# Patient Record
Sex: MALE | Race: BLACK OR AFRICAN AMERICAN | NOT HISPANIC OR LATINO | Employment: UNEMPLOYED | URBAN - METROPOLITAN AREA
[De-identification: names, ages, dates, MRNs, and addresses within clinical notes are randomized per-mention and may not be internally consistent; named-entity substitution may affect disease eponyms.]

---

## 2024-06-21 ENCOUNTER — HOSPITAL ENCOUNTER (EMERGENCY)
Facility: CLINIC | Age: 24
Discharge: HOME | End: 2024-06-21
Attending: EMERGENCY MEDICINE | Admitting: EMERGENCY MEDICINE
Payer: MEDICAID

## 2024-06-21 VITALS
OXYGEN SATURATION: 100 % | HEART RATE: 62 BPM | RESPIRATION RATE: 16 BRPM | SYSTOLIC BLOOD PRESSURE: 113 MMHG | TEMPERATURE: 97.7 F | DIASTOLIC BLOOD PRESSURE: 58 MMHG | WEIGHT: 160 LBS

## 2024-06-21 DIAGNOSIS — R46.89 ABNORMAL BEHAVIOR: ICD-10-CM

## 2024-06-21 PROBLEM — F41.9 ANXIETY: Status: ACTIVE | Noted: 2024-06-21

## 2024-06-21 PROBLEM — F43.22 ADJUSTMENT DISORDER WITH ANXIOUS MOOD: Status: ACTIVE | Noted: 2024-06-21

## 2024-06-21 LAB
AMPHETAMINES UR QL SCN: NORMAL
BARBITURATES UR QL SCN: NORMAL
BENZODIAZ UR QL SCN: NORMAL
BZE UR QL SCN: NORMAL
CANNABINOIDS UR QL SCN: NORMAL
FENTANYL UR QL: NORMAL
OPIATES UR QL SCN: NORMAL
PCP QUAL URINE (ROCHE): NORMAL

## 2024-06-21 PROCEDURE — 80307 DRUG TEST PRSMV CHEM ANLYZR: CPT | Performed by: EMERGENCY MEDICINE

## 2024-06-21 PROCEDURE — 250N000013 HC RX MED GY IP 250 OP 250 PS 637: Performed by: EMERGENCY MEDICINE

## 2024-06-21 PROCEDURE — 99285 EMERGENCY DEPT VISIT HI MDM: CPT

## 2024-06-21 RX ORDER — OLANZAPINE 10 MG/1
10 TABLET, ORALLY DISINTEGRATING ORAL ONCE
Status: COMPLETED | OUTPATIENT
Start: 2024-06-21 | End: 2024-06-21

## 2024-06-21 RX ORDER — DIPHENHYDRAMINE HCL 25 MG
25 CAPSULE ORAL ONCE
Status: COMPLETED | OUTPATIENT
Start: 2024-06-21 | End: 2024-06-21

## 2024-06-21 RX ADMIN — DIPHENHYDRAMINE HYDROCHLORIDE 25 MG: 25 CAPSULE ORAL at 08:46

## 2024-06-21 RX ADMIN — OLANZAPINE 10 MG: 10 TABLET, ORALLY DISINTEGRATING ORAL at 08:46

## 2024-06-21 ASSESSMENT — ACTIVITIES OF DAILY LIVING (ADL)
ADLS_ACUITY_SCORE: 35

## 2024-06-21 NOTE — ED NOTES
Bed: [facility]  Expected date:   Expected time:   Means of arrival:   Comments:  [facility] [age] M suicidal restrained eta 0894

## 2024-06-21 NOTE — ED PROVIDER NOTES
"  Emergency Department Note      History of Present Illness     Chief Complaint  Abnormal Behavior    HPI  Mack Raman is a 23 year old male with a history of polysubstance abuse who presents to the emergency department for evaluation of abnormal behavior. He was found in the rain by PD, and brought in for psychiatric concerns. Per EMS, he was having some thoughts of self harm, suicidal ideation, and homicidal ideation. He initially requested restraints due to concerns that he may try to harm someone, but at bedside does not seem as concerned for this anymore. He is homeless. He was attempting to walk from [place] to [place] in order to \"blow off some steam.\" He also notes that he has not been able to sleep for some time due to feeling unsafe. He denies having any hallucinations, medical problems, or history of harming someone else. He does have a history of drug abuse, and was recently released from a treatment facility in [place], where he was being treated for this issue. He denies any drug use since being discharged. He denies methamphetamine use, but does endorse marijuana use. At bedside, he complains of urinary retention, but this was not an issue before presenting in the ED. Per EMS, blood pressure was 140/90, oxygen saturation at 100%, and heart rate at 60/70 bpm.     Independent Historian  EMS as detailed above.    Review of External Notes  None  Past Medical History   Medical History and Problem List  None    Medications  None    Surgical History   None  Physical Exam   Patient Vitals for the past 24 hrs:   BP Temp Temp src Pulse Resp SpO2 Weight   06/21/24 0840 (!) 130/95 97.7  F (36.5  C) Temporal 62 16 100 % 72.6 kg (160 lb)     Physical Exam  Constitutional: Well developed, nontox appearance  Head: Atraumatic.   Neck:  no stridor  Eyes: no scleral icterus  Cardiovascular: RRR, 2+ right radial pulse  Pulmonary/Chest: nml resp effort  Ext: Warm, well perfused  Neurological: A&O, " symmetric facies, moves ext x4  Skin: Skin is warm and dry.   Psychiatric: Denies SI/HI, does not appear to be responding to internal stimuli, somewhat calm and cooperative  Nursing note and vitals reviewed  Diagnostics   Lab Results   Labs Ordered and Resulted from Time of ED Arrival to Time of ED Departure   URINE DRUG SCREEN PANEL - Normal       Result Value    Amphetamines Urine Screen Negative      Barbituates Urine Screen Negative      Benzodiazepine Urine Screen Negative      Cannabinoids Urine Screen Negative      Cocaine Urine Screen Negative      Fentanyl Qual Urine Screen Negative      Opiates Urine Screen Negative      PCP Urine Screen Negative         Imaging  No orders to display     Independent Interpretation  None  ED Course    Medications Administered  Medications   OLANZapine zydis (zyPREXA) ODT tab 10 mg (10 mg Oral $Given 6/21/24 0846)   diphenhydrAMINE (BENADRYL) capsule 25 mg (25 mg Oral $Given 6/21/24 0846)       Procedures  Procedures     Discussion of Management  None    Social Determinants of Health adding to complexity of care  History of drug use increasing risk for presentation to the emergency department    ED Course  ED Course as of 06/21/24 1450   Fri Jun 21, 2024   0825 I obtained history and examined the patient as noted above.     0925 I spoke with DEC regarding the patient.     Medical Decision Making / Diagnosis   CMS Diagnoses: None    MIPS     None    MDM  23 year old male presenting w/ abnormal behavior     DDx includes psychiatric disorder NOS, personality disorder NOS, abnormal behavior NOS, substance abuse, polysubstance abuse, candei, bipolar disorder.  Doubt meningitis, encephalitis significant trauma given history and physical exam.  Labs significant for drug screen negative.  Interventions as noted above with improvement in symptoms the patient was able to sleep in the emergency department.  Prior to the patient sleeping, DEC evaluated the patient and will plan to  reevaluate the patient later given the evaluation was limited secondary to the medications the patient received.  Should DEC clear him later, I feel he will be safe for discharge.  Patient signed out in stable condition awaiting reevaluation.    Disposition  Care of the patient was transferred to my colleague Dr. Musa pending DEC reevaluation.     ICD-10 Codes:    ICD-10-CM    1. Abnormal behavior  R46.89            Discharge Medications  New Prescriptions    No medications on file         Scribe Disclosure:  I, Noy Garcia, am serving as a scribe at 8:46 AM on 6/21/2024 to document services personally performed by Franco Langston MD based on my observations and the provider's statements to me.        Franco Langston MD  06/21/24 6352

## 2024-06-21 NOTE — ED NOTES
Pt very upset about another pt in the  area because the pt is loud. Writer assessed the situation and spoke with DEC

## 2024-06-21 NOTE — PROGRESS NOTES
Writer tried to check in with patient to determine final disposition however patient was still sleeping and mumbled that he was still resting.      Writer will hand off to an evening LM for follow.  Writer would recommend discharge if patient continues to display future oriented, denies SI/HI, and identifies safe place to discharge to.     Anthony Michaels, LICSW
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
## 2024-06-21 NOTE — PROGRESS NOTES
"Hand off was received from Anthony Michaels [facility], who completed patient's initial DEC assessment earlier this morning. The recommended disposition for patient at that time was to discharge if his presentation remained the same (calm, denied HI/SI) after getting some sleep in the ED. Writer met with patient at approximately 5 PM for discharge and aftercare planning. He had just woken up to eat a snack. He appeared somnolent, but stated, \"I had the best sleep I ever had.\" He stated that he has not used substances and that his altered mental status prior to ED admission was due to lack of sleep. He requested to get a little bit more rest and food before discharging.  He stated that he has not been to a shelter before,  but would need to \"look inside\" before agreeing to go to one. He prefers to discharge \"to the streets.\" He stated that he has been homeless for many years and knows how to navigate the streets and resources. He has been calm, cooperative, and polite. He denied SI and HI.   "

## 2024-06-21 NOTE — ED NOTES
"[facility] brought in patient from [facility] transit hub. Welfare check had been called. Pt standing in the rain, stating he wanted to go to war and be killed; multiple statements about being killed. EMS reports pt being very labile with emotions. EMS also reports pt requesting to be restrained because he thinks he might hurt others. Pt is unsure about own medical hx, states he does not take medications on a regular basis, and states he does not know when he last slept. EMS reports stable vital signs. No BG done with EMS.    Pt arrived to [facility] in restraints. Pt requested to remain in restraints because he was \"afraid he will hurt someone\". Pt changed into spice scrubs.    "

## 2024-06-21 NOTE — ED NOTES
DEC spoke with [facility].  Because of disruption in milieu of other patients in area this pt room is, pt is not getting sleep.  Plan is to send to [facility] to rest and discharge

## 2024-06-22 ENCOUNTER — HOSPITAL ENCOUNTER (EMERGENCY)
Facility: CLINIC | Age: 24
Discharge: HOME | End: 2024-06-22
Attending: FAMILY MEDICINE | Admitting: FAMILY MEDICINE
Payer: MEDICAID

## 2024-06-22 ENCOUNTER — HOSPITAL ENCOUNTER (EMERGENCY)
Facility: CLINIC | Age: 24
Discharge: HOME | End: 2024-06-22
Attending: EMERGENCY MEDICINE | Admitting: EMERGENCY MEDICINE
Payer: MEDICAID

## 2024-06-22 VITALS
HEART RATE: 81 BPM | OXYGEN SATURATION: 100 % | TEMPERATURE: 97.6 F | WEIGHT: 156 LBS | BODY MASS INDEX: 19.4 KG/M2 | SYSTOLIC BLOOD PRESSURE: 121 MMHG | DIASTOLIC BLOOD PRESSURE: 75 MMHG | RESPIRATION RATE: 18 BRPM | HEIGHT: 75 IN

## 2024-06-22 VITALS
OXYGEN SATURATION: 100 % | WEIGHT: 156 LBS | HEART RATE: 74 BPM | DIASTOLIC BLOOD PRESSURE: 74 MMHG | BODY MASS INDEX: 20.02 KG/M2 | RESPIRATION RATE: 16 BRPM | TEMPERATURE: 98.2 F | SYSTOLIC BLOOD PRESSURE: 117 MMHG | HEIGHT: 74 IN

## 2024-06-22 DIAGNOSIS — G89.29 CHRONIC RIGHT-SIDED LOW BACK PAIN WITH RIGHT-SIDED SCIATICA: ICD-10-CM

## 2024-06-22 DIAGNOSIS — F30.8 HYPOMANIC EPISODE (H): ICD-10-CM

## 2024-06-22 DIAGNOSIS — F19.10 POLYSUBSTANCE ABUSE (H): ICD-10-CM

## 2024-06-22 DIAGNOSIS — M54.41 CHRONIC RIGHT-SIDED LOW BACK PAIN WITH RIGHT-SIDED SCIATICA: ICD-10-CM

## 2024-06-22 PROCEDURE — 250N000013 HC RX MED GY IP 250 OP 250 PS 637: Performed by: EMERGENCY MEDICINE

## 2024-06-22 PROCEDURE — 99284 EMERGENCY DEPT VISIT MOD MDM: CPT | Performed by: EMERGENCY MEDICINE

## 2024-06-22 PROCEDURE — 99283 EMERGENCY DEPT VISIT LOW MDM: CPT | Performed by: EMERGENCY MEDICINE

## 2024-06-22 PROCEDURE — 80307 DRUG TEST PRSMV CHEM ANLYZR: CPT | Performed by: FAMILY MEDICINE

## 2024-06-22 PROCEDURE — 99283 EMERGENCY DEPT VISIT LOW MDM: CPT | Mod: 27 | Performed by: FAMILY MEDICINE

## 2024-06-22 PROCEDURE — 99285 EMERGENCY DEPT VISIT HI MDM: CPT | Performed by: FAMILY MEDICINE

## 2024-06-22 RX ORDER — GABAPENTIN 300 MG/1
300 CAPSULE ORAL EVERY 8 HOURS PRN
Qty: 30 CAPSULE | Refills: 0 | Status: ON HOLD | OUTPATIENT
Start: 2024-06-22 | End: 2024-08-08

## 2024-06-22 RX ORDER — DIAZEPAM 2 MG
2 TABLET ORAL ONCE
Status: COMPLETED | OUTPATIENT
Start: 2024-06-22 | End: 2024-06-22

## 2024-06-22 RX ORDER — OLANZAPINE 5 MG/1
5 TABLET, ORALLY DISINTEGRATING ORAL ONCE
Status: COMPLETED | OUTPATIENT
Start: 2024-06-22 | End: 2024-06-22

## 2024-06-22 RX ORDER — IBUPROFEN 600 MG/1
600 TABLET, FILM COATED ORAL ONCE
Status: COMPLETED | OUTPATIENT
Start: 2024-06-22 | End: 2024-06-22

## 2024-06-22 RX ORDER — ACETAMINOPHEN 325 MG/1
975 TABLET ORAL ONCE
Status: COMPLETED | OUTPATIENT
Start: 2024-06-22 | End: 2024-06-22

## 2024-06-22 RX ORDER — NAPROXEN 500 MG/1
500 TABLET ORAL 2 TIMES DAILY WITH MEALS
Qty: 14 TABLET | Refills: 0 | Status: ON HOLD | OUTPATIENT
Start: 2024-06-22 | End: 2024-08-08

## 2024-06-22 RX ORDER — GABAPENTIN 300 MG/1
300 CAPSULE ORAL ONCE
Status: COMPLETED | OUTPATIENT
Start: 2024-06-22 | End: 2024-06-22

## 2024-06-22 RX ORDER — ACETAMINOPHEN 500 MG
1000 TABLET ORAL EVERY 8 HOURS PRN
Qty: 42 TABLET | Refills: 0 | Status: ON HOLD | OUTPATIENT
Start: 2024-06-22 | End: 2024-08-08

## 2024-06-22 RX ADMIN — DIAZEPAM 2 MG: 2 TABLET ORAL at 08:46

## 2024-06-22 RX ADMIN — ACETAMINOPHEN 975 MG: 325 TABLET, FILM COATED ORAL at 08:45

## 2024-06-22 RX ADMIN — NICOTINE POLACRILEX 4 MG: 4 GUM, CHEWING BUCCAL at 09:27

## 2024-06-22 RX ADMIN — IBUPROFEN 600 MG: 600 TABLET, FILM COATED ORAL at 08:45

## 2024-06-22 RX ADMIN — GABAPENTIN 300 MG: 300 CAPSULE ORAL at 10:17

## 2024-06-22 ASSESSMENT — ACTIVITIES OF DAILY LIVING (ADL)
ADLS_ACUITY_SCORE: 35

## 2024-06-22 ASSESSMENT — COLUMBIA-SUICIDE SEVERITY RATING SCALE - C-SSRS
6. HAVE YOU EVER DONE ANYTHING, STARTED TO DO ANYTHING, OR PREPARED TO DO ANYTHING TO END YOUR LIFE?: NO
2. HAVE YOU ACTUALLY HAD ANY THOUGHTS OF KILLING YOURSELF IN THE PAST MONTH?: NO
1. IN THE PAST MONTH, HAVE YOU WISHED YOU WERE DEAD OR WISHED YOU COULD GO TO SLEEP AND NOT WAKE UP?: NO

## 2024-06-22 NOTE — ED NOTES
Pt resting in bed, when approached to do vitals, pt stated they don't want to be bothered right now, they were just starting to fall asleep.

## 2024-06-22 NOTE — ED TRIAGE NOTES
"When asked what brings the pt in to the ED? Pt started, \"whatever the EMS tells you.\"  When asked if pt is safe at home, pt stated, \"I am safe where I live.\" [place]   Triage Assessment (Adult)       Row Name 06/22/24 0112          Triage Assessment    Airway WDL WDL        Respiratory WDL    Respiratory WDL WDL        Skin Circulation/Temperature WDL    Skin Circulation/Temperature WDL WDL        Cardiac WDL    Cardiac WDL WDL        Cognitive/Neuro/Behavioral WDL    Cognitive/Neuro/Behavioral WDL WDL                     "

## 2024-06-22 NOTE — DISCHARGE INSTRUCTIONS
For pain, please take 975-1000mg acetaminophen (tylenol) every 8 hours -- do not take more than 3000mg in a 24 hour period.    You can also take 600mg ibuprofen (motrin/advil) every 6 hours for pain  Take gabapentin as needed x 10 days as trial for presumed sciatica pain in right leg  Please call today to schedule a follow-up appointment with your primary medical doctor in 3-5 days for reevaluation, which is important after today's emergency department visit.  For leg pain office visit, Please make an appointment to follow up with Sports Medicine (phone: [phone])

## 2024-06-22 NOTE — ED PROVIDER NOTES
This patient was signed out by Dr. Musa pending reassessment by the DEC team.  They find that the patient is improving with significant clearing of his sensorium.  The patient has a negative U tox.  He has no physical complaints.  He has a normal set of vital signs.  He is safe to be discharged home.  He was given bus tokens.  He does not appear to be a risk to himself or others.     Trierweiler, Chad A, MD  06/21/24 8320

## 2024-06-22 NOTE — ED PROVIDER NOTES
ED Provider Note  [facility]      History     Chief Complaint   Patient presents with    Drug / Alcohol Assessment     Per EMS, pt was found by bystander on the sidewalk. Chronic lower extremities pain. Pt appears to be manic. Pt seeking detox from drug and alcohol use. VSS en route.      HPI  Lamine Giron is a 23 year old male with a history of polysubstance abuse who presents to the emergency department for evaluation of abnormal behavior. By chart review, patient has a history of similar encounters with abnormal behavior on arrival to the Emergency Department and improvement in symptoms and behavior after time and presumably metabolizing underlying ingested substances. Tonight the patient was brought in by EMS after someone called due to witnessing the patient on the sidewalk exhibiting abnormal and manic behavior. Patient has no specific complaints on arrival, admits to drug and alcohol use.     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    Allergies   Allergen Reactions    Paliperidone Other (See Comments)     Other reaction(s): Extrapyramidal Symptoms   Significant EPS    Significant EPS    Pork Allergy Other (See Comments)     Tenriism prohibition     Family History  No family history on file.  Social History   Social History     Tobacco Use    Smoking status: Every Day     Current packs/day: 1.00     Types: Cigarettes    Smokeless tobacco: Never    Tobacco comments:     Danika turpin   Substance Use Topics    Alcohol use: Never    Drug use: Not Currently      A medically appropriate review of systems was performed  "with pertinent positives and negatives noted in the HPI, and all other systems negative.    Physical Exam   BP: 126/78  Pulse: 69  Temp: 97.8  F (36.6  C)  Resp: 16  Height: 188 cm (6' 2\")  Weight: 70.8 kg (156 lb)  SpO2: 100 %  Physical Exam  Vitals and nursing note reviewed.   Constitutional:       General: He is not in acute distress.     Appearance: Normal appearance. He is not toxic-appearing.   HENT:      Head: Atraumatic.   Eyes:      General: No scleral icterus.     Conjunctiva/sclera: Conjunctivae normal.   Cardiovascular:      Rate and Rhythm: Normal rate.      Heart sounds: Normal heart sounds.   Pulmonary:      Effort: Pulmonary effort is normal. No respiratory distress.      Breath sounds: Normal breath sounds.   Abdominal:      Palpations: Abdomen is soft.      Tenderness: There is no abdominal tenderness.   Musculoskeletal:         General: No deformity.      Cervical back: Neck supple.   Skin:     General: Skin is warm and dry.   Neurological:      Mental Status: He is alert.           ED Course, Procedures, & Data      Procedures                Results for orders placed or performed during the hospital encounter of 06/22/24   Urine Drug Screen Panel     Status: Normal   Result Value Ref Range    Amphetamines Urine Screen Negative Screen Negative    Barbituates Urine Screen Negative Screen Negative    Benzodiazepine Urine Screen Negative Screen Negative    Cannabinoids Urine Screen Negative Screen Negative    Cocaine Urine Screen Negative Screen Negative    Fentanyl Qual Urine Screen Negative Screen Negative    Opiates Urine Screen Negative Screen Negative    PCP Urine Screen Negative Screen Negative   Urine Drug Screen     Status: Normal    Narrative    The following orders were created for panel order Urine Drug Screen.  Procedure                               Abnormality         Status                     ---------                               -----------         ------                     Urine " Drug Screen Panel[[id number]]      Normal              Final result                 Please view results for these tests on the individual orders.     Medications   ibuprofen (ADVIL/MOTRIN) tablet 600 mg (600 mg Oral $Given 6/22/24 0845)   diazepam (VALIUM) tablet 2 mg (2 mg Oral $Given 6/22/24 0846)   acetaminophen (TYLENOL) tablet 975 mg (975 mg Oral $Given 6/22/24 0845)   gabapentin (NEURONTIN) capsule 300 mg (300 mg Oral $Given 6/22/24 1017)     Labs Ordered and Resulted from Time of ED Arrival to Time of ED Departure - No data to display  No orders to display          Critical care was not performed.     Medical Decision Making  The patient's presentation was of moderate complexity (an undiagnosed new problem with uncertain prognosis).    The patient's evaluation involved:  review of external note(s) from 2 sources (see separate area of note for details)  review of 3+ test result(s) ordered prior to this encounter (see separate area of note for details)    The patient's management necessitated further care after sign-out to oncoming ED physician (see their note for further management).    Assessment & Plan    Lamine Giron is a 23 year old male with a history of polysubstance abuse who presents to the emergency department for evaluation of abnormal behavior.  Patient is nontoxic appearing in the department, has vital signs within normal limits.  He is cooperative sitting up in bed, denies suicidal nation, homicidal ideation.  Given the concerns, DEC was consulted and patient was signed out pending DEC evaluation for further evaluation and management recommendations.    I have reviewed the nursing notes. I have reviewed the findings, diagnosis, plan and need for follow up with the patient.    Discharge Medication List as of 6/22/2024 10:27 AM        START taking these medications    Details   gabapentin (NEURONTIN) 300 MG capsule Take 1 capsule (300 mg) by mouth every 8 hours as needed for neuropathic pain  or other (right leg pain), Disp-30 capsule, R-0, E-Prescribe             Final diagnoses:   Chronic right-sided low back pain with right-sided sciatica       Sascha Hobson MD  [facility] EMERGENCY DEPARTMENT  6/22/2024     Sascha Hobson MD  06/29/24 1514

## 2024-06-22 NOTE — ED PROVIDER NOTES
7am -pending DEC assessment after presentation for abnormal behavior in the setting of polysubstance use    10 AM -over the past 3 hours patient has become mentally clear, linearly speaking, ambulatory.  Washing face and hair in bathroom and sink.  Patient expresses having years of right-sided leg pain which starts in the low back/hip and extends down the posterior of his leg.  Extra strength Tylenol, NSAIDs, low-dose Valium x 1 given.    Repeat evaluation shows patient ambulatory with less pain.  Denying SI, HI hallucinations or delusions.  Will cancel DEC assessment, dose with 1 gabapentin and prescribe short course for neuropathic pain.  Discharge to sports medicine follow-up     Buck Ring MD  06/22/24 6000

## 2024-06-23 NOTE — ED NOTES
"This writer introduced herself to the patient. Patient was informed that this writer will be his primary nurse. This writer asked the patient what brought him to ED. Patient replied \"I  already told 20 people. I don't need you.\" Patient was instructed  to provided urine for analysis. He replied \"You should have told me before I emptied my tank.\" This writer informed the patient that he can provide urine next time he goes to the bathroom.   "

## 2024-06-23 NOTE — DISCHARGE INSTRUCTIONS
Discharged from the emergency room with recommendation to continue with current outpatient medications including gabapentin as previously prescribed by her report follow-up with your primary clinic this week recommend stopping the use of any illicit drugs or alcohol.

## 2024-06-23 NOTE — ED NOTES
"Patient was discharged. He refused copy of discharge instruction. He said \"I don't need to come back here right. I don't need to come back here. I can go home.\" When this writer about to reply patient stated \"I don't want to talk to you. I don't need that (referring to the discharge instruction)\"  Patient's belonging was returned to the patient. He was escorted out by the security.   "

## 2024-06-23 NOTE — ED PROVIDER NOTES
[facility] EMERGENCY DEPARTMENT ([facility])    6/22/24      ED PROVIDER NOTE       History     Chief Complaint   Patient presents with    Paranoid    Psychiatric Evaluation     HPI  Lamine Giron is a 23 year old male with history of polysubstance use disorder who presents to the emergency department with symptoms of paranoia seeking psychiatric evaluation.  Patient presents with hypomania and possible delusions somewhat paranoid however patient adamantly denies wanting to harm himself or others I was not able to access any sort of collateral information at this time but patient will be seen by the  patient is requesting that he be discharged and states that he does not feel as though he needs to stay here I have offered medications as well as inpatient admission patient is unwilling to do so at this time it did not appear to be an imminent threat to himself or others and will be discharged.    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 No pertinent family history.  Social History   Social History     Tobacco Use    Smoking status: Every Day     Current packs/day: 1.00     Types: Cigarettes    Smokeless tobacco: Never    Tobacco comments:     Danika turpin   Substance Use Topics    Alcohol use: Never    Drug use:  "Not Currently      A complete review of systems was performed with pertinent positives and negatives noted in the HPI, and all other systems negative.    Physical Exam   BP: 121/75  Pulse: 81  Temp: 97.6  F (36.4  C)  Resp: 18  Height: 190.5 cm (6' 3\")  Weight: 70.8 kg (156 lb)  SpO2: 100 %  Physical Exam  Vitals and nursing note reviewed.   Constitutional:       General: He is not in acute distress.     Appearance: Normal appearance. He is not toxic-appearing.   HENT:      Head: Atraumatic.   Eyes:      General: No scleral icterus.     Conjunctiva/sclera: Conjunctivae normal.   Cardiovascular:      Rate and Rhythm: Normal rate.      Heart sounds: Normal heart sounds.   Pulmonary:      Effort: Pulmonary effort is normal. No respiratory distress.      Breath sounds: Normal breath sounds.   Abdominal:      Palpations: Abdomen is soft.      Tenderness: There is no abdominal tenderness.   Musculoskeletal:         General: No deformity.      Cervical back: Neck supple.   Skin:     General: Skin is warm.   Neurological:      General: No focal deficit present.      Mental Status: He is alert and oriented to person, place, and time.      Sensory: No sensory deficit.      Motor: No weakness.      Coordination: Coordination normal.   Psychiatric:         Mood and Affect: Mood is anxious.         Speech: Speech is rapid and pressured.         Behavior: Behavior is cooperative.         Thought Content: Thought content is paranoid. Thought content does not include homicidal or suicidal ideation.         ED Course, Procedures, & Data      Procedures         Results for orders placed or performed during the hospital encounter of 06/22/24   Urine Drug Screen Panel     Status: Normal   Result Value Ref Range    Amphetamines Urine Screen Negative Screen Negative    Barbituates Urine Screen Negative Screen Negative    Benzodiazepine Urine Screen Negative Screen Negative    Cannabinoids Urine Screen Negative Screen Negative    Cocaine " Urine Screen Negative Screen Negative    Fentanyl Qual Urine Screen Negative Screen Negative    Opiates Urine Screen Negative Screen Negative    PCP Urine Screen Negative Screen Negative   Urine Drug Screen     Status: Normal    Narrative    The following orders were created for panel order Urine Drug Screen.  Procedure                               Abnormality         Status                     ---------                               -----------         ------                     Urine Drug Screen Panel[[id number]]      Normal              Final result                 Please view results for these tests on the individual orders.     Medications   OLANZapine zydis (zyPREXA) ODT tab 5 mg (5 mg Oral Not Given 6/22/24 2038)     Labs Ordered and Resulted from Time of ED Arrival to Time of ED Departure   URINE DRUG SCREEN PANEL - Normal       Result Value    Amphetamines Urine Screen Negative      Barbituates Urine Screen Negative      Benzodiazepine Urine Screen Negative      Cannabinoids Urine Screen Negative      Cocaine Urine Screen Negative      Fentanyl Qual Urine Screen Negative      Opiates Urine Screen Negative      PCP Urine Screen Negative       No orders to display          Critical care was not performed.     Medical Decision Making  The patient's presentation was of moderate complexity (a chronic illness mild to moderate exacerbation, progression, or side effect of treatment).    The patient's evaluation involved:  discussion of management or test interpretation with another health professional (DEC assessment was performed)    The patient's management necessitated high risk (a decision regarding hospitalization).    Assessment & Plan        I have reviewed the nursing notes. I have reviewed the findings, diagnosis, plan and need for follow up with the patient.    With ongoing paranoia likely hypomanic does not appear to be immediate threat to himself or others was not able to get any further collateral  information at this time patient does not appear to be immediately holdable and is requesting discharge    Final diagnoses:   Polysubstance abuse (H)   Hypomanic episode (H)       Sanford Boykin I, Giovany Soriano, am serving as a trained medical scribe to document services personally performed by Sanford Boykin MD based on the provider's statements to me on June 22, 2024.  This document has been checked and approved by the attending provider.    I, Sanford Boykin MD, was physically present and have reviewed and verified the accuracy of this note documented by Giovany Soriano medical scribe.      Sanford Boykin MD   [facility] EMERGENCY DEPARTMENT  6/22/2024     Sanford Boykin MD  06/26/24 2045

## 2024-06-23 NOTE — ED NOTES
"Patient refuse Zyprexa. He said \"if you have lots of muscle relaxant give it to me otherwise don't talk to me.\"  "

## 2024-06-23 NOTE — ED NOTES
"Patient was up I the hallway. He told on of the PA that he's bored. He told the PA that he can not get something to distract him he will put on a show. When the patient saw this writer, patient said in loud voice :\"where is my muscle relaxant? Where is my doctor?\" MD was informed. He talked to the patient.   "

## 2024-06-23 NOTE — ED TRIAGE NOTES
Pt states feels something bad is going to happen and doesn't want to do anything bad.     Triage Assessment (Adult)       Row Name 06/22/24 1935          Triage Assessment    Airway WDL WDL        Respiratory WDL    Respiratory WDL WDL        Skin Circulation/Temperature WDL    Skin Circulation/Temperature WDL WDL        Cardiac WDL    Cardiac WDL WDL        Peripheral/Neurovascular WDL    Peripheral Neurovascular WDL WDL        Cognitive/Neuro/Behavioral WDL    Cognitive/Neuro/Behavioral WDL WDL

## 2024-06-25 ENCOUNTER — HOSPITAL ENCOUNTER (OUTPATIENT)
Facility: CLINIC | Age: 24
Setting detail: OBSERVATION
Discharge: HOME | End: 2024-06-26
Attending: EMERGENCY MEDICINE | Admitting: EMERGENCY MEDICINE
Payer: MEDICAID

## 2024-06-25 DIAGNOSIS — F19.10 POLYSUBSTANCE ABUSE (H): ICD-10-CM

## 2024-06-25 DIAGNOSIS — F30.10 MANIC BEHAVIOR (H): ICD-10-CM

## 2024-06-25 DIAGNOSIS — M54.32 LEFT SIDED SCIATICA: Primary | ICD-10-CM

## 2024-06-25 PROCEDURE — 99223 1ST HOSP IP/OBS HIGH 75: CPT | Performed by: EMERGENCY MEDICINE

## 2024-06-25 PROCEDURE — 250N000013 HC RX MED GY IP 250 OP 250 PS 637: Performed by: EMERGENCY MEDICINE

## 2024-06-25 PROCEDURE — 80307 DRUG TEST PRSMV CHEM ANLYZR: CPT | Performed by: EMERGENCY MEDICINE

## 2024-06-25 PROCEDURE — 99285 EMERGENCY DEPT VISIT HI MDM: CPT | Performed by: EMERGENCY MEDICINE

## 2024-06-25 RX ORDER — NAPROXEN 500 MG/1
500 TABLET ORAL ONCE
Status: COMPLETED | OUTPATIENT
Start: 2024-06-25 | End: 2024-06-25

## 2024-06-25 RX ORDER — ACETAMINOPHEN 500 MG
1000 TABLET ORAL ONCE
Status: COMPLETED | OUTPATIENT
Start: 2024-06-25 | End: 2024-06-25

## 2024-06-25 RX ORDER — GABAPENTIN 300 MG/1
300 CAPSULE ORAL ONCE
Status: COMPLETED | OUTPATIENT
Start: 2024-06-25 | End: 2024-06-25

## 2024-06-25 RX ORDER — LORAZEPAM 1 MG/1
1 TABLET ORAL ONCE
Status: COMPLETED | OUTPATIENT
Start: 2024-06-25 | End: 2024-06-25

## 2024-06-25 RX ORDER — OLANZAPINE 5 MG/1
5 TABLET, ORALLY DISINTEGRATING ORAL ONCE
Status: COMPLETED | OUTPATIENT
Start: 2024-06-25 | End: 2024-06-25

## 2024-06-25 RX ADMIN — LORAZEPAM 1 MG: 1 TABLET ORAL at 23:34

## 2024-06-25 RX ADMIN — GABAPENTIN 300 MG: 300 CAPSULE ORAL at 23:34

## 2024-06-25 RX ADMIN — NAPROXEN 500 MG: 500 TABLET ORAL at 23:34

## 2024-06-25 RX ADMIN — ACETAMINOPHEN 1000 MG: 500 TABLET ORAL at 23:33

## 2024-06-25 RX ADMIN — OLANZAPINE 5 MG: 5 TABLET, ORALLY DISINTEGRATING ORAL at 23:34

## 2024-06-25 ASSESSMENT — COLUMBIA-SUICIDE SEVERITY RATING SCALE - C-SSRS
1. IN THE PAST MONTH, HAVE YOU WISHED YOU WERE DEAD OR WISHED YOU COULD GO TO SLEEP AND NOT WAKE UP?: NO
6. HAVE YOU EVER DONE ANYTHING, STARTED TO DO ANYTHING, OR PREPARED TO DO ANYTHING TO END YOUR LIFE?: NO
2. HAVE YOU ACTUALLY HAD ANY THOUGHTS OF KILLING YOURSELF IN THE PAST MONTH?: NO

## 2024-06-25 ASSESSMENT — ACTIVITIES OF DAILY LIVING (ADL): ADLS_ACUITY_SCORE: 33

## 2024-06-26 ENCOUNTER — HOSPITAL ENCOUNTER (INPATIENT)
Facility: CLINIC | Age: 24
LOS: 43 days | Discharge: NURSING, REHAB OR LONG-TERM CARE | End: 2024-08-09
Attending: FAMILY MEDICINE | Admitting: PSYCHIATRY & NEUROLOGY
Payer: MEDICAID

## 2024-06-26 ENCOUNTER — MEDICAL CORRESPONDENCE (OUTPATIENT)
Dept: HEALTH INFORMATION MANAGEMENT | Facility: CLINIC | Age: 24
End: 2024-06-26

## 2024-06-26 VITALS
HEART RATE: 83 BPM | TEMPERATURE: 98.7 F | RESPIRATION RATE: 16 BRPM | SYSTOLIC BLOOD PRESSURE: 131 MMHG | DIASTOLIC BLOOD PRESSURE: 86 MMHG | OXYGEN SATURATION: 100 %

## 2024-06-26 DIAGNOSIS — F25.0 SCHIZOAFFECTIVE DISORDER, BIPOLAR TYPE (H): ICD-10-CM

## 2024-06-26 DIAGNOSIS — F30.9 MANIA (H): ICD-10-CM

## 2024-06-26 DIAGNOSIS — R52 PAIN: ICD-10-CM

## 2024-06-26 DIAGNOSIS — F31.12 BIPOLAR AFFECTIVE DISORDER, CURRENTLY MANIC, MODERATE (H): ICD-10-CM

## 2024-06-26 DIAGNOSIS — R45.851 SUICIDAL IDEATION: ICD-10-CM

## 2024-06-26 DIAGNOSIS — G47.09 OTHER INSOMNIA: ICD-10-CM

## 2024-06-26 DIAGNOSIS — E56.9 VITAMIN DEFICIENCY: ICD-10-CM

## 2024-06-26 DIAGNOSIS — Z91.148 NONCOMPLIANCE WITH MEDICATION REGIMEN: Primary | ICD-10-CM

## 2024-06-26 DIAGNOSIS — K59.09 OTHER CONSTIPATION: ICD-10-CM

## 2024-06-26 DIAGNOSIS — F41.9 ANXIETY: ICD-10-CM

## 2024-06-26 DIAGNOSIS — R45.1 AGITATION: ICD-10-CM

## 2024-06-26 DIAGNOSIS — M62.838 MUSCLE SPASM: ICD-10-CM

## 2024-06-26 PROBLEM — F29 PSYCHOSIS (H): Status: ACTIVE | Noted: 2024-06-26

## 2024-06-26 PROBLEM — F31.10 BIPOLAR AFFECTIVE DISORDER, CURRENT EPISODE MANIC (H): Status: ACTIVE | Noted: 2024-06-26

## 2024-06-26 PROBLEM — F30.10 MANIC BEHAVIOR (H): Status: ACTIVE | Noted: 2024-06-26

## 2024-06-26 PROBLEM — F19.10 POLYSUBSTANCE ABUSE (H): Status: ACTIVE | Noted: 2024-06-26

## 2024-06-26 LAB
ALBUMIN SERPL BCG-MCNC: 4.3 G/DL (ref 3.5–5.2)
ALP SERPL-CCNC: 75 U/L (ref 40–150)
ALT SERPL W P-5'-P-CCNC: 37 U/L (ref 0–70)
AMPHETAMINES UR QL SCN: NORMAL
AMPHETAMINES UR QL SCN: NORMAL
ANION GAP SERPL CALCULATED.3IONS-SCNC: 7 MMOL/L (ref 7–15)
AST SERPL W P-5'-P-CCNC: 31 U/L (ref 0–45)
BARBITURATES UR QL SCN: NORMAL
BARBITURATES UR QL SCN: NORMAL
BASOPHILS # BLD AUTO: 0 10E3/UL (ref 0–0.2)
BASOPHILS NFR BLD AUTO: 0 %
BENZODIAZ UR QL SCN: NORMAL
BENZODIAZ UR QL SCN: NORMAL
BILIRUB SERPL-MCNC: 0.4 MG/DL
BUN SERPL-MCNC: 12.1 MG/DL (ref 6–20)
BZE UR QL SCN: NORMAL
BZE UR QL SCN: NORMAL
CALCIUM SERPL-MCNC: 9.3 MG/DL (ref 8.6–10)
CANNABINOIDS UR QL SCN: NORMAL
CANNABINOIDS UR QL SCN: NORMAL
CHLORIDE SERPL-SCNC: 100 MMOL/L (ref 98–107)
CK SERPL-CCNC: 148 U/L (ref 39–308)
CREAT SERPL-MCNC: 0.86 MG/DL (ref 0.67–1.17)
DEPRECATED HCO3 PLAS-SCNC: 29 MMOL/L (ref 22–29)
EGFRCR SERPLBLD CKD-EPI 2021: >90 ML/MIN/1.73M2
EOSINOPHIL # BLD AUTO: 0.2 10E3/UL (ref 0–0.7)
EOSINOPHIL NFR BLD AUTO: 2 %
ERYTHROCYTE [DISTWIDTH] IN BLOOD BY AUTOMATED COUNT: 13 % (ref 10–15)
FENTANYL UR QL: NORMAL
FENTANYL UR QL: NORMAL
GLUCOSE SERPL-MCNC: 110 MG/DL (ref 70–99)
HCT VFR BLD AUTO: 42.4 % (ref 40–53)
HGB BLD-MCNC: 14.3 G/DL (ref 13.3–17.7)
IMM GRANULOCYTES # BLD: 0 10E3/UL
IMM GRANULOCYTES NFR BLD: 0 %
LYMPHOCYTES # BLD AUTO: 1.9 10E3/UL (ref 0.8–5.3)
LYMPHOCYTES NFR BLD AUTO: 26 %
MCH RBC QN AUTO: 30.6 PG (ref 26.5–33)
MCHC RBC AUTO-ENTMCNC: 33.7 G/DL (ref 31.5–36.5)
MCV RBC AUTO: 91 FL (ref 78–100)
MONOCYTES # BLD AUTO: 0.8 10E3/UL (ref 0–1.3)
MONOCYTES NFR BLD AUTO: 11 %
NEUTROPHILS # BLD AUTO: 4.6 10E3/UL (ref 1.6–8.3)
NEUTROPHILS NFR BLD AUTO: 61 %
NRBC # BLD AUTO: 0 10E3/UL
NRBC BLD AUTO-RTO: 0 /100
OPIATES UR QL SCN: NORMAL
OPIATES UR QL SCN: NORMAL
PCP QUAL URINE (ROCHE): NORMAL
PCP QUAL URINE (ROCHE): NORMAL
PLATELET # BLD AUTO: 257 10E3/UL (ref 150–450)
POTASSIUM SERPL-SCNC: 3.9 MMOL/L (ref 3.4–5.3)
PROT SERPL-MCNC: 6.9 G/DL (ref 6.4–8.3)
RBC # BLD AUTO: 4.68 10E6/UL (ref 4.4–5.9)
SODIUM SERPL-SCNC: 136 MMOL/L (ref 135–145)
WBC # BLD AUTO: 7.5 10E3/UL (ref 4–11)

## 2024-06-26 PROCEDURE — 80307 DRUG TEST PRSMV CHEM ANLYZR: CPT | Performed by: FAMILY MEDICINE

## 2024-06-26 PROCEDURE — 250N000013 HC RX MED GY IP 250 OP 250 PS 637: Performed by: FAMILY MEDICINE

## 2024-06-26 PROCEDURE — G0378 HOSPITAL OBSERVATION PER HR: HCPCS

## 2024-06-26 PROCEDURE — 85025 COMPLETE CBC W/AUTO DIFF WBC: CPT | Performed by: FAMILY MEDICINE

## 2024-06-26 PROCEDURE — 99285 EMERGENCY DEPT VISIT HI MDM: CPT | Performed by: FAMILY MEDICINE

## 2024-06-26 PROCEDURE — 80053 COMPREHEN METABOLIC PANEL: CPT | Performed by: FAMILY MEDICINE

## 2024-06-26 PROCEDURE — 250N000013 HC RX MED GY IP 250 OP 250 PS 637: Performed by: EMERGENCY MEDICINE

## 2024-06-26 PROCEDURE — 99207 PR NO CHARGE LOS: CPT | Performed by: CLINICAL NURSE SPECIALIST

## 2024-06-26 PROCEDURE — 82550 ASSAY OF CK (CPK): CPT | Performed by: FAMILY MEDICINE

## 2024-06-26 PROCEDURE — 36415 COLL VENOUS BLD VENIPUNCTURE: CPT | Performed by: FAMILY MEDICINE

## 2024-06-26 RX ORDER — OLANZAPINE 5 MG/1
5 TABLET, ORALLY DISINTEGRATING ORAL ONCE
Status: COMPLETED | OUTPATIENT
Start: 2024-06-26 | End: 2024-06-26

## 2024-06-26 RX ORDER — NICOTINE 21 MG/24HR
1 PATCH, TRANSDERMAL 24 HOURS TRANSDERMAL ONCE
Status: COMPLETED | OUTPATIENT
Start: 2024-06-26 | End: 2024-06-28

## 2024-06-26 RX ORDER — POLYETHYLENE GLYCOL 3350 17 G
4 POWDER IN PACKET (EA) ORAL
Status: DISCONTINUED | OUTPATIENT
Start: 2024-06-26 | End: 2024-06-26 | Stop reason: HOSPADM

## 2024-06-26 RX ORDER — GABAPENTIN 300 MG/1
300 CAPSULE ORAL ONCE
Status: COMPLETED | OUTPATIENT
Start: 2024-06-26 | End: 2024-06-26

## 2024-06-26 RX ORDER — HYDROXYZINE HYDROCHLORIDE 25 MG/1
25 TABLET, FILM COATED ORAL EVERY 6 HOURS PRN
Status: DISCONTINUED | OUTPATIENT
Start: 2024-06-26 | End: 2024-06-26 | Stop reason: HOSPADM

## 2024-06-26 RX ORDER — OLANZAPINE 10 MG/1
10 TABLET, ORALLY DISINTEGRATING ORAL AT BEDTIME
Status: DISCONTINUED | OUTPATIENT
Start: 2024-06-26 | End: 2024-07-02

## 2024-06-26 RX ORDER — LORAZEPAM 1 MG/1
1 TABLET ORAL ONCE
Status: COMPLETED | OUTPATIENT
Start: 2024-06-26 | End: 2024-06-26

## 2024-06-26 RX ADMIN — NICOTINE POLACRILEX 4 MG: 2 LOZENGE ORAL at 09:10

## 2024-06-26 RX ADMIN — HYDROXYZINE HYDROCHLORIDE 25 MG: 25 TABLET ORAL at 09:22

## 2024-06-26 RX ADMIN — NICOTINE 1 PATCH: 21 PATCH, EXTENDED RELEASE TRANSDERMAL at 21:58

## 2024-06-26 RX ADMIN — GABAPENTIN 300 MG: 300 CAPSULE ORAL at 21:58

## 2024-06-26 RX ADMIN — HYDROXYZINE HYDROCHLORIDE 25 MG: 25 TABLET ORAL at 00:49

## 2024-06-26 RX ADMIN — Medication 5 MG: at 00:49

## 2024-06-26 RX ADMIN — OLANZAPINE 10 MG: 10 TABLET, ORALLY DISINTEGRATING ORAL at 21:58

## 2024-06-26 RX ADMIN — LORAZEPAM 1 MG: 1 TABLET ORAL at 00:50

## 2024-06-26 RX ADMIN — OLANZAPINE 5 MG: 5 TABLET, ORALLY DISINTEGRATING ORAL at 00:50

## 2024-06-26 ASSESSMENT — ACTIVITIES OF DAILY LIVING (ADL)
ADLS_ACUITY_SCORE: 35

## 2024-06-26 ASSESSMENT — COLUMBIA-SUICIDE SEVERITY RATING SCALE - C-SSRS
2. HAVE YOU ACTUALLY HAD ANY THOUGHTS OF KILLING YOURSELF IN THE PAST MONTH?: NO
ATTEMPT SINCE LAST CONTACT: NO
2. HAVE YOU ACTUALLY HAD ANY THOUGHTS OF KILLING YOURSELF?: NO
6. HAVE YOU EVER DONE ANYTHING, STARTED TO DO ANYTHING, OR PREPARED TO DO ANYTHING TO END YOUR LIFE?: NO
1. SINCE LAST CONTACT, HAVE YOU WISHED YOU WERE DEAD OR WISHED YOU COULD GO TO SLEEP AND NOT WAKE UP?: NO
6. HAVE YOU EVER DONE ANYTHING, STARTED TO DO ANYTHING, OR PREPARED TO DO ANYTHING TO END YOUR LIFE?: NO
SUICIDE, SINCE LAST CONTACT: NO
1. IN THE PAST MONTH, HAVE YOU WISHED YOU WERE DEAD OR WISHED YOU COULD GO TO SLEEP AND NOT WAKE UP?: NO
TOTAL  NUMBER OF ABORTED OR SELF INTERRUPTED ATTEMPTS SINCE LAST CONTACT: NO
TOTAL  NUMBER OF INTERRUPTED ATTEMPTS SINCE LAST CONTACT: NO

## 2024-06-26 NOTE — ED NOTES
Pt up for discharge, states he is missing his vape and the clothes he came in with. Documentation in the chart states on night shift that he was searched and had two belonging bags. Both belonging bags were given to pt. Pt became verbally aggressive and posturing at staff when his vape couldn't be found. Pt was escorted out by security and given the phone number for patient relations.

## 2024-06-26 NOTE — ED NOTES
Pt awake, pacing in EDHW, screaming at staff not to talk to him. Toilet and PO fluid offered. Pt demanded to be washed up in bathroom. Pt was let into the bathroom some clean towels provided with staff supervision for pt's safety as noted pt is not steady on his feet. Pt washed up, new scrubs provided. Pt changed into new scrubs and was asking for a Vape. Nicotine patch/gum or lozenge tab offered. Pt declined unless is a Vape.

## 2024-06-26 NOTE — MEDICATION SCRIBE - ADMISSION MEDICATION HISTORY
Medication Scribe Admission Medication History    Admission medication history is complete. The information provided in this note is only as accurate as the sources available at the time of the update.    Information Source(s): Patient and CareEverywhere/SureScripts via in-person    Pertinent Information: Pt states he is not taking any medications. Left discharge medications on pt chart as he was recently in the ED (6/22).    Changes made to PTA medication list:  Added: None  Deleted: None  Changed: None    Allergies reviewed with patient and updates made in EHR: yes    Medication History Completed By: Kayy Torres 6/26/2024 10:42 AM    No outpatient medications have been marked as taking for the 6/25/24 encounter (Hospital Encounter).

## 2024-06-26 NOTE — DISCHARGE INSTRUCTIONS
"    Coordinators from [facility] ([facility]) will be calling you in the next 24-48 hours to ensure that you have the resources you need. For additonal need of mental health services, you can also contact [facility] coordinators directly at [phone].    Recommendations:    1) Take all medications as prescribed by psychiatry  2) Follow up with therapy/counseling  3) Consider a shelter and substance abuse treatment    If I am feeling unsafe or I am in a crisis, I will:  - Contact my established care providers   - Call the National Suicide Prevention Lifeline: 457.447.5029   - [place] CRISIS TEXT Line 852452  - Go to the nearest emergency room   - Call 911 or 988 or 211    Below is a list of FREE Mental Health Options in the [facility] Area:  [facility] ([facility])  [address] [place] [address], 24/7 Crisis Intervention Center  Serves those in emotional crisis with 24-hour, seven-day-a-week crisis counseling, assessment, referral, and medication management.        Suicidal: [phone] Consultation: [phone]     [facility]  [phone]  Serves those in need of free outpatient mental health care  Hours: Mon, Wed, Fri 1-3pm; Mon-Thurs 6:30-8:30pm    [facility]  [address] [place] [address]  [phone]    Peer Support  Call the [facility] Helpline at  502.810.3163  Or text \"HelpLine\" to 18837    Community Resources  Fast Tracker  Linking people to mental health and substance use disorder resources  fasttrackermn.org     Por espanol, texto  DARREL a 922249 o texto a 442-AYUDAME en [facility] Mental Health Warm Line  Peer to peer support  Monday thru Saturday, 12 pm to 10 pm  [phone] or 9.474.159.2841  Text \"Support\" to 92305    National [place] [employer] Mental Illness (SAMEER)  958.558.3367 or 1.888.SAMEER.HELPS      [facility] Shelter Hotline: 1-771.965.6481      [facility] sponsors the First Call for Help referral services; they have listings for " a wide variety of community support services and can be reached at [facility].    [facility]  Overnight emergency shelter for men and women is provided at [facility] [place]  Address:   [facility]  [address] [place] [address]  [phone]    [facility]  [address] [facility]  Phone: [phone]  Emergency Housing: Provides shelter, personal hygiene items and meals for homeless single adults who are receiving GA, social security or other benefits. Access through [facility] Shelter Team at [address] [facility] (5-11 pm daily) or [address] [place] [address]. Open to men and women.    [facility]: A secure shelter for single homeless women. Beds are available first come, first serve each night, starting at 4 pm. Open to women only.    [facility]  Sunita Shepard [address] [place] [address]  Phone: [phone]  Provides: Overnight shelter for adult men (18 and up). Doors open at 7:15 pm and close at 8:00 am.  Remarks: Men need to have resided in [facility] for the past 30 days to be eligible. They need to have no financial resources to house themselves. There is a total of 20 beds in the shelter.  Resources: Independent Living Skills classes, Computer Lab ac-cess, meal, laundry    [facility], [phone]  [address] [place] [address]  9a-5:30p Monday through Friday or 1p-5:30p on Saturdays and Sundays. Must bring a photo ID. Staff will help with a community card and assign a shelter.    [facility] ([facility])  During the day call [phone] or after 5pm call [phone] (men), [phone] (women)  [address] [place] [address]    [facility] ([facility])  [phone] ext. [phone]  [address] [facility]    [facility] ([employer]), [phone]  [address] [facility]

## 2024-06-26 NOTE — CONSULTS
Diagnostic Evaluation Consultation  Crisis Assessment    Patient Name: Lamine Giron  Age:  23 year old  Legal Sex: male  Gender Identity: male  Pronouns:   Race: Black or   Ethnicity: Not  or   Language: English      Patient was assessed: In person   Crisis Assessment Start Date: 06/25/24  Crisis Assessment Start Time: 2332  Crisis Assessment Stop Time: 1203  Patient location: [facility] EMERGENCY DEPARTMENT                             [facility]    Referral Data and Chief Complaint  Lamine Giron presents to the ED via EMS. Patient is presenting to the ED for the following concerns: Verbal agitation, Paranoia, Anxiety, Suicidal ideation.   Factors that make the mental health crisis life threatening or complex are:    The patient prefers to be called  J . Upon introducing myself, the patient asked twice  Can I trust you?  and will my information be handled as  classified? . He reportedly came to the ED because the police  just swamped into my bedroom , because I was having difficulty breathing. He has been living with his aunt in [place] due to being homeless for several years. He expresses concern about his aunt because she has been different lately towards him, in that she  gives out bad energy . He feels that his entire family is against him, being mean and unkind to him. He displayed agitation, being loud as he talks about being angry at his family who has been  f..  with him. He describes them as snakes while making a sss/hissing sound, saying  I want to kill snakes . Collateral source reports the patient threatened to kill himself by jumping off a high building. He has a court hearing scheduled for next week, due to threats to kill his sister. He is upset at his sister for filing the charges. He reports chronic pain on the left side of his body. He used herbs to relieve the pain but instead got burnt by the treatment. He pulled down the side of his shirt  "and there were not any burn marks. Patient continues to perseverate on pain inside his body, saying it is distressing. He denies suicidal and homicidal ideation. He denies hallucinations and delusions. He has not been taking prescribed medications and does not remember the last time he took medications. He smoked cocaine a couple of days ago. Current urine screen is negative for substances. He was recently discharged from a substance use treatment program because  I just left . His aunt confirmed this account of the patient. Pt and collateral source denies any current/hx of civil commitment..      Informed Consent and Assessment Methods  Explained the crisis assessment process, including applicable information disclosures and limits to confidentiality, assessed understanding of the process, and obtained consent to proceed with the assessment.  Assessment methods included conducting a formal interview with patient, review of medical records, collaboration with medical staff, and obtaining relevant collateral information from family and community providers when available.  : done     Patient response to interventions: eager to participate  Coping skills were attempted to reduce the crisis:  Patient cooperated with the police and came to the ED     History of the Crisis   Medical record indicate [facility] mental health dx of \"unspecified psychosis, paranoia, stimulant use disorder with stimulant induced psychosis with delusion, bipolar disorder, drug induced parkinsonism,  catatonic disorder, and manic episode (severe, with psychosis)\". Hx of 3 prior mental health hospitalizations. Medical records indicates \"He was born in [place], [place]. Parents emigrated to the [place] from [place]. The family has lived in [place], [place] most of [facility] life. He got his GED after completing 11th grade.    Brief Psychosocial History  Family:  Single, Children no  Support System:  Parent(s), Other (specify) (His aunt Nita @ " "[phone])  Employment Status:  disabled, unemployed  Source of Income:  none  Financial Environmental Concerns:  none  Current Hobbies:  sports/team sports, television/movies/videos  Barriers in Personal Life:  mental health concerns    Significant Clinical History  Current Anxiety Symptoms:  anxious  Current Depression/Trauma:  irritable, helplessness, impaired decision making, thoughts of death/suicide (Collateral source reports the patient threatened to kill himself by jumping off a high building.)  Current Somatic Symptoms:  somatic symptoms (abdominal pain, headache, tension), racing thoughts, anxious  Current Psychosis/Thought Disturbance:  auditory hallucinations, impulsive, hyperactive, elated mood, hyperverbal, flight of ideas  Current Eating Symptoms:     Chemical Use History:  Alcohol: None  Benzodiazepines: None  Opiates: None  Cocaine: None  Marijuana: None  Other Use: None   Past diagnosis:  Bipolar Disorder, Suicide attempt(s), Substance Use Disorder  Family history:  No known history of mental health or chemical health concerns  Past treatment:  Inpatient Hospitalization, Psychiatric Medication Management  Details of most recent treatment:  Substance use treatment in [place], when patient suddenly left after one day in the program. Psychiatric medicaitons which patient discontinued \"a while back\".  Other relevant history:          Collateral Information  Is there collateral information: Yes     Collateral information name, relationship, phone number:  His aunt Ivette @ [phone]    What happened today: He stays with me now for a while. He has Bipolar. He has not been sleeping for 2 weeks, walking around the house, talking loudly to others/people when there is no one else in the room. He threatened to get on a high building, jump off to kill himself. He is not going to counseling and is not taking his medications.     What is different about patient's functioning: He wants to kill himself " by jumping off a building.     Concern about alcohol/drug use:  He left a substance use treatment program recently, but I don't know what happened.    What do you think the patient needs:  treatment and medications    Has patient made comments about wanting to kill themselves/others: yes    If d/c is recommended, can they take part in safety/aftercare planning:  yes    Additional collateral information:  He is allergic to an injectable medication that was prescribed at [facility]. He got seizures from that medications. She did not know the name of the medication nor for what treatment it was given.     Risk Assessment  [place] Suicide Severity Rating Scale Full Clinical Version:  6/25/2024  Suicidal Ideation   No  Q6 Suicide Behavior (Lifetime): no    [place] Suicide Severity Rating Scale Recent:  Current report might be impacted by the patient's current symptoms of psychosis and paranoia.   Suicidal Ideation (Recent)  Q1 Wished to be Dead (Past Month): no  Q2 Suicidal Thoughts (Past Month): no  Level of Risk per Screen: no risks indicated  Intensity of Ideation (Recent)  Most Severe Ideation Rating (Past 1 Month):  (Patient denies suicidal ideation.)  Description of Most Severe Ideation (Past 1 Month): Patient denies suicidal ideation.  Frequency (Past 1 Month):  (Patient denies suicidal ideation.)  Duration (Past 1 Month):  (Patient denies suicidal ideation.)  Controllability (Past 1 Month):  (Patient denies suicidal ideation.)  Deterrents (Past 1 Month): Does not apply  Reasons for Ideation (Past 1 Month): Does not apply  Suicidal Behavior (Recent)  Actual Attempt (Past 3 Months): No  Total Number of Actual Attempts (Past 3 Months): 0  Actual Attempt Description (Past 3 Months): Patient denies suicidal attempts  Has subject engaged in non-suicidal self-injurious behavior? (Past 3 Months): No  Total Number of Interrupted Attempts (Past 3 Months): 0  Interrupted Attempt Description (Past 3 Months): Patient  "denies suicidal attempts  Aborted or Self-Interrupted Attempt (Past 3 Months): No  Total Number of Aborted or Self-Interrupted Attempts (Past 3 Months): 0  Aborted or Self-Interrupted Attempt Description (Past 3 Months): Patient denies suicidal attempts  Preparatory Acts or Behavior (Past 3 Months): No  Total Number of Preparatory Acts (Past 3 Months): 0  Preparatory Acts or Behavior Description (Past 3 Months): Patient denies suicidal attempts    Environmental or Psychosocial Events: legal issues such as DWI, DUI, lawsuit, CPS involvement, etc., geographic isolation from supports, challenging interpersonal relationships, helplessness/hopelessness, impulsivity/recklessness, unemployment/underemployment, unstable housing, homelessness  Protective Factors: Protective Factors: strong bond to family unit, community support, or employment, able to access care without barriers, lives in a responsibly safe and stable environment, help seeking, cultural, spiritual , or Latter-day beliefs associated with meaning and value in life    Does the patient have thoughts of harming others? Feels Like Hurting Others: no (Patient referred to his family as \"snakes\", and says that \"snakes should be killed\".)  Previous Attempt to Hurt Others: no  Current presentation: Irritable  Violence Threats in Past 6 Months: Patient denies. Collateral source denies  Current Violence Plan or Thoughts: Patient denies. However, Patient referred to his family as \"snakes\", and says that \"snakes should be killed\".) Hx of threatening to kill his sister and has a current court case coming up  Is the patient engaging in sexually inappropriate behavior?: no  Duty to warn initiated: no    Is the patient engaging in sexually inappropriate behavior?  no        Mental Status Exam   Affect: Dramatic  Appearance: Disheveled  Attention Span/Concentration: Attentive  Eye Contact: Engaged    Fund of Knowledge: Appropriate   Language /Speech Content: Non-Fluent, " Expressive Speech  Language /Speech Volume: Loud  Language /Speech Rate/Productions: Hyperverbal  Recent Memory: Variable  Remote Memory: Variable  Mood: Anxious, Irritable  Orientation to Person: Yes   Orientation to Place: Yes  Orientation to Time of Day: Yes  Orientation to Date: Yes     Situation (Do they understand why they are here?): No  Psychomotor Behavior: Agitated, Hyperactive  Thought Content: Paranoia, Homicidal, Suicidal, Hallucinations, Delusions  Thought Form: Goal Directed, Paranoia     Mini-Cog Assessment  Number of Words Recalled:    Clock-Drawing Test:     Three Item Recall:    Mini-Cog Total Score:       Medication  Psychotropic medications:   Medication Orders - Psychiatric (From admission, onward)      Start     Dose/Rate Route Frequency Ordered Stop    06/26/24 0030  hydrOXYzine HCl (ATARAX) tablet 25 mg         25 mg Oral EVERY 6 HOURS PRN 06/26/24 0030      06/26/24 0030  OLANZapine zydis (zyPREXA) ODT tab 5 mg         5 mg Oral ONCE 06/26/24 0028               Current Care Team  Patient Care Team:  No Ref-Primary, Physician as PCP - General    Diagnosis  Patient Active Problem List   Diagnosis Code    Anxiety F41.9    Adjustment disorder with anxious mood F43.22    Polysubstance abuse (H) F19.10    Manic behavior (H) F30.10    Psychosis (H) F29    Bipolar affective disorder, current episode manic (H) F31.10       Primary Problem This Admission  Active Hospital Problems    *Bipolar affective disorder, current episode manic (H)   F31.10      Psychosis (H)      F29        Clinical Summary and Substantiation of Recommendations   Patient present psychiatric symptoms including paranoia, hallucinations, suicidal ideation with a plan to commit suicide and homicidal threats. Patient stopped taking prescribed medications several weeks ago, has not been sleeping for two weeks, presents with hyper verbal, pressured speech, agitation, and disorganized thoughts and speech. Drug screen is negative for  use of substances. Patient is not currently participating in mental health and substance use treatment. He left a substance use treatment program recently after one day in the program. Collateral source confirmed patient's current presentation, and threats to commit suicide. In consultation with Dr Ring, the patient is medically appropriate for observation status, psych consult and re-start of medications and re-assessment for follow-up treatment. Due to current presentation, patient is unable to engage in safety and aftercare planning.          Patient coping skills attempted to reduce the crisis:  Patient cooperated with the police and came to the ED    Disposition  Recommended disposition: Observation        Reviewed case and recommendations with attending provider. Attending Name: Dr Buck Ring       Attending concurs with disposition: yes       Patient and/or validated legal guardian concurs with disposition:   yes       Final disposition:  observation    Legal status on admission: Voluntary/Patient has signed consent for treatment    Assessment Details   Total duration spent with the patient: 30 min     CPT code(s) utilized: 38947 - Psychotherapy for Crisis - 60 (30-74*) min    ABDIFATAH Vera, [facility], Psychotherapist  DEC - Triage & Transition Services  Callback: [phone]

## 2024-06-26 NOTE — ED PROVIDER NOTES
"    [facility] EMERGENCY DEPARTMENT ([facility])    6/25/24      ED PROVIDER NOTE    History     Chief Complaint   Patient presents with    Manic Behavior     Yelling at home. Aunt called PD due to pt's behavior. Some SI comments but denied per EMS. Cooperative.      HPI  Lamine Giron is a 23 year old male presents from aunt's house where he intermittently stays for manic behavior. Police Department reports patient was yelling. Patient has polysubstance use history and chronic unilateral sciatica. Currently, patient's cannot tell us exactly what occurred, reports that \"he was surprised by the police officers\" that he would \"rather be homeless.\" Denies SI, HI, delusions or hallucinations. Denies fever or trauma. Reports left-sided unilateral leg pain rating down his back that I had the opportunity to see him 2 days ago and was reported that he had unilateral right-sided leg pain.    Physical Exam   BP: 139/81  Pulse: 102  Temp: 98.9  F (37.2  C)  Resp: 18  SpO2: 98 %  Physical Exam  Patient moving all 4 extremities and been breathing comfortably.   Appears anxious, pressured speech.   Can give appropriate straightforward answers and short bursts, but will speak in different subjects in a nonlinear way.    ED Course, Procedures, & Data      Procedures          Results for orders placed or performed during the hospital encounter of 06/25/24   Urine Drug Screen Panel     Status: Normal   Result Value Ref Range    Amphetamines Urine Screen Negative Screen Negative    Barbituates Urine Screen Negative Screen Negative    Benzodiazepine Urine Screen Negative Screen Negative    Cannabinoids Urine Screen Negative Screen Negative    Cocaine Urine Screen Negative Screen Negative    Fentanyl Qual Urine Screen Negative Screen Negative    Opiates Urine Screen Negative Screen Negative    PCP Urine Screen Negative Screen Negative   Urine Drug Screen     Status: Normal    Narrative    The following orders were created for " panel order Urine Drug Screen.  Procedure                               Abnormality         Status                     ---------                               -----------         ------                     Urine Drug Screen Panel[[id number]]      Normal              Final result                 Please view results for these tests on the individual orders.     Medications   gabapentin (NEURONTIN) capsule 300 mg (300 mg Oral $Given 6/25/24 2334)   acetaminophen (TYLENOL) tablet 1,000 mg (1,000 mg Oral $Given 6/25/24 2333)   LORazepam (ATIVAN) tablet 1 mg (1 mg Oral $Given 6/25/24 2334)   OLANZapine zydis (zyPREXA) ODT tab 5 mg (5 mg Oral $Given 6/25/24 2334)   naproxen (NAPROSYN) tablet 500 mg (500 mg Oral $Given 6/25/24 2334)     Labs Ordered and Resulted from Time of ED Arrival to Time of ED Departure   URINE DRUG SCREEN PANEL - Normal       Result Value    Amphetamines Urine Screen Negative      Barbituates Urine Screen Negative      Benzodiazepine Urine Screen Negative      Cannabinoids Urine Screen Negative      Cocaine Urine Screen Negative      Fentanyl Qual Urine Screen Negative      Opiates Urine Screen Negative      PCP Urine Screen Negative       No orders to display          Critical care was not performed.     Medical Decision Making  The patient's presentation was of high complexity (an acute health issue posing potential threat to life or bodily function).    The patient's evaluation involved:  ordering and/or review of 1 test(s) in this encounter (see separate area of note for details)  discussion of management or test interpretation with another health professional (DEC )    The patient's management necessitated high risk (a decision regarding hospitalization).    Assessment & Plan    Differential diagnosis includes manic episode versus polysubstance use induced behavior. Will treat with oral Zyprexa and Ativan. Patient expresses unilateral leg discomfort consistent with sciatica. Will dose  with gabapentin, NSAIDs and Tylenol. DEC assessment for mental health evaluation. Will continue 12-hour health officer hold as patient is not fit to be discharged on my initial assessment.    12:15 AM reassessment -   Patient was willingly taking oral meds.  I spoke with DEC  Evie who interviewed onto via phone who reports patient has not taken medications and has been more disorganized.  Given that patient's similar episode earlier this week which had cleared within 24 hours, likely due to substance use, will admit patient under ED observation status and observe overnight.      I have reviewed the nursing notes. I have reviewed the findings, diagnosis, plan and need for follow up with the patient.    New Prescriptions    No medications on file       Final diagnoses:   Polysubstance abuse (H)   Manic behavior (H)       Buck Ring MD  [facility] EMERGENCY DEPARTMENT  6/25/2024     Buck Ring MD  06/26/24 0019

## 2024-06-26 NOTE — PROGRESS NOTES
"Triage and Transition Services Extended Care Reassessment     Patient: Lamine goes by \"Lamine,\" uses he/him pronouns  Date of Service: June 26, 2024  Site of Service: [facility] EMERGENCY DEPARTMENT                               Patient was seen yes  Mode of Assessment: Virtual: AmWell     Reason for Reassessment: paranoia    History of Patient's Original Emergency Room Encounter: Medical record indicate rior mental health dx of \"unspecified psychosis, paranoia, stimulant use disorder with stimulant induced psychosis with delusion, bipolar disorder, drug induced parkinsonism,  catatonic disorder, and manic episode (severe, with psychosis)\". Hx of 3 prior mental health hospitalizations. Medical records indicates \"He was born in [place], [place]. Parents emigrated to the [place] from [place]. The family has lived in [place], [place] most of [facility] life. He got his GED after completing 11th grade.    Current Patient Presentation: Pt was cooperative and denies having current paranoia. He denies SI, HI, NSSI, and AVH's. Pt indicates he would like to discharge. He expressed an interest in finding housing and job supports. Pt was no agreeable to  behavioral health and declined assistance with outpatient services for MH or [facility].    Presentation Summary: Pt is seen by extended care for therapeutic check-in and reassessment. Exchanged greeting, introduced self and role. Pt is calm and cooperative and expressed a desire to discharge.  Offered patient:  [facility] assessment and treatment, inpatient mental health treatment, IOP, therapy, psychiatry. Pt declined assistance with scheduling of these services. Pt is not considered to be holdable.  Asked Pt if writer could contact someone that knows them for collateral information and he declined. He indicated he did not have questions and did not have anything additional that he wanted to talk about. Writer engaged Pt in conversation about utilizing support system and engaging " in more frequent self care activities that do no include use of alcohol or illicit drugs.    Changes Observed Since Initial Assessment: decrease in presenting symptoms    Therapeutic Interventions Provided: Engaged in guided discovery, explored patient's perspectives and helped expand them through socratic dialogue.    Current Symptoms: anxious irritable excessive worry, anxious, racing thoughts impulsive, distractability, high risk behavior      Mental Status Exam   Affect: Blunted  Appearance: Disheveled  Attention Span/Concentration: Attentive  Eye Contact: Engaged    Fund of Knowledge: Appropriate   Language /Speech Content: Fluent  Language /Speech Volume: Normal  Language /Speech Rate/Productions: Normal  Recent Memory: Variable  Remote Memory: Variable  Mood: Irritable  Orientation to Person: Yes   Orientation to Place: Yes  Orientation to Time of Day: Yes  Orientation to Date: No     Situation (Do they understand why they are here?): Yes  Psychomotor Behavior: Normal  Thought Content: Clear  Thought Form: Intact    Treatment Objective(s) Addressed: assessing safety, identifying additional supports, processing feelings, rapport building, orienting the patient to therapy    Patient Response to Interventions: acceptance expressed    Progress Towards Goals:  Patient Reports Symptoms Are: stable  Patient Progress Toward Goals: other  Comment: Pt is considered to be at baseline and appropriate for discharge.  Next Step to Work Toward Discharge:  (Pt is discharging from the ER. Writer is providing him with resources. He engaged in safety planning process.)    Case Management:      C-SSRS Since Last Contact:   1. Wish to be Dead (Since Last Contact): No  2. Non-Specific Active Suicidal Thoughts (Since Last Contact): No     Actual Attempt (Since Last Contact): No  Has subject engaged in non-suicidal self-injurious behavior? (Since Last Contact): No  Interrupted Attempts (Since Last Contact): No  Aborted or  Self-Interrupted Attempt (Since Last Contact): No  Preparatory Acts or Behavior (Since Last Contact): No  Suicide (Since Last Contact): No     Calculated C-SSRS Risk Score (Since Last Contact): No Risk Indicated    Plan: Final Disposition / Recommended Care Path: discharge  Plan for Care reviewed with assigned Medical Provider: yes  Plan for Care Team Review: provider, RN  Comments: Consulted with Dr. Lee  Patient and/or validated legal guardian concurs: no    Clinical Substantiation: Pt denies having SI, HI, SIB, paranoia, and AVH's. Upon completion of assessment and in consultation with the attending provider, the pt s circumstances and mental state appear to be appropriate for outpatient management. It is the recommendation of this clinician that pt discharge with OP MH support.  Pt indicates they are able to manage their mental health symptoms safely in the community.    Legal Status: Legal Status at Admission: Voluntary/Patient has signed consent for treatment    Session Status: Time session started: 1140  Time session ended: 1158  Session Duration (minutes): 18 minutes  Session Number: 1  Anticipated number of sessions or this episode of care: 1    Session Start Time: 1140  Session Stop Time: 1158  CPT codes: 05288 - Psychotherapy (with patient) - 30 (16-37*) min  Time Spent: 18 minutes      CPT code(s) utilized: 59984 - Psychotherapy (with patient) - 30 (16-37*) min    Diagnosis:   Patient Active Problem List   Diagnosis Code    Anxiety F41.9    Adjustment disorder with anxious mood F43.22    Polysubstance abuse (H) F19.10    Manic behavior (H) F30.10    Psychosis (H) F29    Bipolar affective disorder, current episode manic (H) F31.10       Primary Problem This Admission: Active Hospital Problems    *Bipolar affective disorder, current episode manic (H)      Psychosis (H)    F31.10      Idris Maddox, [facility]   Licensed Mental Health Professional (LMHP), [facility]  [phone]

## 2024-06-26 NOTE — CONSULTS
[facility]- Psychiatric Consultation  Emergency Department    Lamine Giron MRN: [id number]   Age: 23 year old YOB: 2000       Patient was discharged before he could be seen.      MIGUEL Medina [facility]   Emergency Psychiatry   6/26/2024

## 2024-06-26 NOTE — ED NOTES
"Writer informed by security of pt's escalating behavior. Writer went to pt's bedside to find that he had thrown all of his garbage, food and drink onto the floor. Pt requesting to \"go outside and hit my vape\" and \"tell these security guards what's up\". Writer informed pt that he cannot go outside and smoke but offered a nicotine lozenge, gum or patch. Writer told pt that he can have lozenge once he cleans up the mess he made. Pt cleaned up his mess with security nearby and was given a lozenge.   "

## 2024-06-26 NOTE — PLAN OF CARE
Lamine Giron  June 26, 2024  Plan of Care Hand-off Note     Patient Care Path:  Observation    Plan for Care:   Patient present psychiatric symptoms including paranoia, hallucinations, suicidal ideation with a plan to commit suicide and homicidal threats. Patient stopped taking prescribed medications several weeks ago, has not been sleeping for two weeks, presents with hyper verbal, pressured speech, agitation, and disorganized thoughts and speech. Drug screen is negative for use of substances. Patient is not currently participating in mental health and substance use treatment. He left a substance use treatment program recently after one day in the program.     Collateral source confirmed patient's current presentation, and threats to commit suicide. In consultation with Dr Ring, the patient is medically appropriate for observation status, psych consult,  re-start of medications and re-assessment for follow-up treatment. Due to current presentation and need for re-assessment, the patient was unable to participate in aftercare and safety planning.     Identified Goals and Safety Issues: Symptom stabilization, Psych consult, Treatment, Safety.    Overview:  Patient's aunt, with whom he curently lives: Eduardo @ [phone]          Legal Status: Legal Status at Admission: Voluntary/Patient has signed consent for treatment    Psychiatry Consult:  YES       Updated   regarding plan of care.           ABDIFATAH Vera, [facility] Center[facility], Psychotherapist

## 2024-06-26 NOTE — ED NOTES
"Pt was noted walking towards nursing station (hyperactive). RN, PA, and onsite security attempted to redirect pt back to his assigned area. Pt yelled loudly \"I know my boundaries.\" Reiterated that his boundary is not to go pass EDW D/security area. PT reluctant and refused to comply. A [facility] Security intervened. Pt complied and escorted back to his assigned stretcher with .    "

## 2024-06-26 NOTE — ED NOTES
"Pt is being disrupted (took his shirt off, talking/laughing loudly). Pt stated, \"I can't sleep, I want to talk to the president, I want to entertain others.\" Pt redirectable but then would turn around and do the same thing. Pt c/o of left leg pain. Pain meds given pt stating is not working. Ice/heat  pack offered pt declined stating is not working. MD notified.   "

## 2024-06-26 NOTE — ED NOTES
Bed: [facility]  Expected date: 6/25/24  Expected time: 10:50 PM  Means of arrival: Ambulance  Comments:  Hold Malathi 542 22 y/o on hold crisis

## 2024-06-26 NOTE — CONSULTS
DEC Consult Order placed. DEC assessment completed by Yeimy Yarbrough on 6/25/2024 at 11:32pm. Consult acknowledged and completed. Extended Care will follow up today.     MARTINEZ Quiroz

## 2024-06-26 NOTE — ED TRIAGE NOTES
Pt came in with NIHARIKA. Per EMS tt living with his aunt and aunt called PD reporting pt was yelling and became manic. Pt has a hx of mental health but currently not on meds.

## 2024-06-27 ENCOUNTER — TELEPHONE (OUTPATIENT)
Dept: BEHAVIORAL HEALTH | Facility: CLINIC | Age: 24
End: 2024-06-27
Payer: MEDICAID

## 2024-06-27 PROBLEM — R45.851 SUICIDAL IDEATION: Status: ACTIVE | Noted: 2024-06-27

## 2024-06-27 PROBLEM — F30.9 MANIA (H): Status: ACTIVE | Noted: 2024-06-27

## 2024-06-27 PROBLEM — R45.1 AGITATION: Status: ACTIVE | Noted: 2024-06-27

## 2024-06-27 LAB — SARS-COV-2 RNA RESP QL NAA+PROBE: NEGATIVE

## 2024-06-27 PROCEDURE — 99418 PROLNG IP/OBS E/M EA 15 MIN: CPT | Performed by: CLINICAL NURSE SPECIALIST

## 2024-06-27 PROCEDURE — 250N000013 HC RX MED GY IP 250 OP 250 PS 637: Performed by: EMERGENCY MEDICINE

## 2024-06-27 PROCEDURE — 99255 IP/OBS CONSLTJ NEW/EST HI 80: CPT | Performed by: CLINICAL NURSE SPECIALIST

## 2024-06-27 PROCEDURE — 87635 SARS-COV-2 COVID-19 AMP PRB: CPT | Performed by: EMERGENCY MEDICINE

## 2024-06-27 PROCEDURE — 250N000011 HC RX IP 250 OP 636: Performed by: EMERGENCY MEDICINE

## 2024-06-27 PROCEDURE — 124N000002 HC R&B MH UMMC

## 2024-06-27 PROCEDURE — 96372 THER/PROPH/DIAG INJ SC/IM: CPT | Performed by: EMERGENCY MEDICINE

## 2024-06-27 RX ORDER — AMOXICILLIN 250 MG
1 CAPSULE ORAL 2 TIMES DAILY PRN
Status: DISCONTINUED | OUTPATIENT
Start: 2024-06-27 | End: 2024-08-09 | Stop reason: HOSPADM

## 2024-06-27 RX ORDER — HYDROXYZINE HYDROCHLORIDE 50 MG/1
50 TABLET, FILM COATED ORAL EVERY 6 HOURS PRN
Status: DISCONTINUED | OUTPATIENT
Start: 2024-06-27 | End: 2024-08-09 | Stop reason: HOSPADM

## 2024-06-27 RX ORDER — HYDROXYZINE HYDROCHLORIDE 25 MG/1
25 TABLET, FILM COATED ORAL EVERY 4 HOURS PRN
Status: DISCONTINUED | OUTPATIENT
Start: 2024-06-27 | End: 2024-07-02

## 2024-06-27 RX ORDER — HALOPERIDOL 5 MG/ML
5 INJECTION INTRAMUSCULAR ONCE
Status: COMPLETED | OUTPATIENT
Start: 2024-06-27 | End: 2024-06-27

## 2024-06-27 RX ORDER — LORAZEPAM 2 MG/ML
1 INJECTION INTRAMUSCULAR ONCE
Status: COMPLETED | OUTPATIENT
Start: 2024-06-27 | End: 2024-06-27

## 2024-06-27 RX ORDER — OLANZAPINE 10 MG/1
10 TABLET, ORALLY DISINTEGRATING ORAL 2 TIMES DAILY PRN
Status: DISCONTINUED | OUTPATIENT
Start: 2024-06-27 | End: 2024-06-27 | Stop reason: DRUGHIGH

## 2024-06-27 RX ORDER — OLANZAPINE 10 MG/2ML
10 INJECTION, POWDER, FOR SOLUTION INTRAMUSCULAR DAILY PRN
Status: DISCONTINUED | OUTPATIENT
Start: 2024-06-27 | End: 2024-06-27

## 2024-06-27 RX ORDER — OLANZAPINE 10 MG/2ML
10 INJECTION, POWDER, FOR SOLUTION INTRAMUSCULAR 3 TIMES DAILY PRN
Status: DISCONTINUED | OUTPATIENT
Start: 2024-06-27 | End: 2024-08-09 | Stop reason: HOSPADM

## 2024-06-27 RX ORDER — VITAMIN B COMPLEX
50 TABLET ORAL AT BEDTIME
Status: DISCONTINUED | OUTPATIENT
Start: 2024-06-27 | End: 2024-08-09 | Stop reason: HOSPADM

## 2024-06-27 RX ORDER — OLANZAPINE 10 MG/2ML
10 INJECTION, POWDER, FOR SOLUTION INTRAMUSCULAR DAILY PRN
Status: DISCONTINUED | OUTPATIENT
Start: 2024-06-27 | End: 2024-06-28

## 2024-06-27 RX ORDER — MAGNESIUM HYDROXIDE/ALUMINUM HYDROXICE/SIMETHICONE 120; 1200; 1200 MG/30ML; MG/30ML; MG/30ML
30 SUSPENSION ORAL EVERY 4 HOURS PRN
Status: DISCONTINUED | OUTPATIENT
Start: 2024-06-27 | End: 2024-08-09 | Stop reason: HOSPADM

## 2024-06-27 RX ORDER — TRAZODONE HYDROCHLORIDE 50 MG/1
50 TABLET, FILM COATED ORAL
Status: DISCONTINUED | OUTPATIENT
Start: 2024-06-27 | End: 2024-08-09 | Stop reason: HOSPADM

## 2024-06-27 RX ORDER — ACETAMINOPHEN 325 MG/1
650 TABLET ORAL EVERY 4 HOURS PRN
Status: DISCONTINUED | OUTPATIENT
Start: 2024-06-27 | End: 2024-08-09 | Stop reason: HOSPADM

## 2024-06-27 RX ORDER — DIPHENHYDRAMINE HYDROCHLORIDE 50 MG/ML
50 INJECTION INTRAMUSCULAR; INTRAVENOUS ONCE
Status: COMPLETED | OUTPATIENT
Start: 2024-06-27 | End: 2024-06-27

## 2024-06-27 RX ORDER — NICOTINE 21 MG/24HR
1 PATCH, TRANSDERMAL 24 HOURS TRANSDERMAL ONCE
Status: COMPLETED | OUTPATIENT
Start: 2024-06-27 | End: 2024-06-28

## 2024-06-27 RX ORDER — OLANZAPINE 10 MG/1
10 TABLET ORAL 3 TIMES DAILY PRN
Status: DISCONTINUED | OUTPATIENT
Start: 2024-06-27 | End: 2024-08-09 | Stop reason: HOSPADM

## 2024-06-27 RX ORDER — OLANZAPINE 5 MG/1
5 TABLET, ORALLY DISINTEGRATING ORAL EVERY MORNING
Status: DISCONTINUED | OUTPATIENT
Start: 2024-06-27 | End: 2024-07-02

## 2024-06-27 RX ORDER — GABAPENTIN 300 MG/1
300 CAPSULE ORAL 3 TIMES DAILY
Status: DISCONTINUED | OUTPATIENT
Start: 2024-06-27 | End: 2024-07-04

## 2024-06-27 RX ORDER — OLANZAPINE 10 MG/1
10 TABLET, ORALLY DISINTEGRATING ORAL DAILY PRN
Status: DISCONTINUED | OUTPATIENT
Start: 2024-06-27 | End: 2024-06-28

## 2024-06-27 RX ADMIN — HYDROXYZINE HYDROCHLORIDE 50 MG: 50 TABLET, FILM COATED ORAL at 08:21

## 2024-06-27 RX ADMIN — HALOPERIDOL LACTATE 5 MG: 5 INJECTION, SOLUTION INTRAMUSCULAR at 13:13

## 2024-06-27 RX ADMIN — LORAZEPAM 1 MG: 2 INJECTION INTRAMUSCULAR; INTRAVENOUS at 13:13

## 2024-06-27 RX ADMIN — NICOTINE POLACRILEX 4 MG: 4 GUM, CHEWING BUCCAL at 08:21

## 2024-06-27 RX ADMIN — OLANZAPINE 10 MG: 10 TABLET, ORALLY DISINTEGRATING ORAL at 10:26

## 2024-06-27 RX ADMIN — GABAPENTIN 300 MG: 300 CAPSULE ORAL at 06:38

## 2024-06-27 RX ADMIN — GABAPENTIN 300 MG: 300 CAPSULE ORAL at 20:21

## 2024-06-27 RX ADMIN — DIPHENHYDRAMINE HYDROCHLORIDE 50 MG: 50 INJECTION, SOLUTION INTRAMUSCULAR; INTRAVENOUS at 13:14

## 2024-06-27 RX ADMIN — NICOTINE 1 PATCH: 21 PATCH, EXTENDED RELEASE TRANSDERMAL at 06:18

## 2024-06-27 ASSESSMENT — ACTIVITIES OF DAILY LIVING (ADL)
ADLS_ACUITY_SCORE: 35
ADLS_ACUITY_SCORE: 28
ADLS_ACUITY_SCORE: 35
ADLS_ACUITY_SCORE: 45
ADLS_ACUITY_SCORE: 35
ADLS_ACUITY_SCORE: 28
ADLS_ACUITY_SCORE: 35

## 2024-06-27 ASSESSMENT — LIFESTYLE VARIABLES: SKIP TO QUESTIONS 9-10: 0

## 2024-06-27 NOTE — TELEPHONE ENCOUNTER
8:15 AM: Intake paged Ugo to present Pt for station 30.    9:31 AM: Second page sent for review.    10:48 AM: Intake called station 30. Provider is currently meeting with a pt.    10:57 AM: Pt accepted to station 30 and will need an SIO. Admit can occur later this afternoon.    11:15 AM: Pt placed in queue and added to PPS admit board. Indicia completed.    11:16 AM: Intake spoke with station 30 CRN. Report can occur around 430pm.    11:20 AM: Intake called [facility] ED and provided disposition to [facility].

## 2024-06-27 NOTE — CONSULTS
"[facility] [place]- Psychiatric Consultation  Emergency Department    Lamine Giron MRN: [id number]   Age: 23 year old YOB: 2000       Patient was assessed and interviewed in person at his room in the ED. Assessment methods included conducting a formal interview with patient, review of medical records, collaboration with medical staff, and obtaining relevant collateral information from family and community providers when available.      MIGUEL Medina CNP         Attending Physician: Jose Roe MD     Current Outpatient Psychiatrist: none   Primary Care Provider: No Ref-Primary, Physician      History     Chief Complaint   Patient presents with    Agitation     Pt brought in by family. Pt reports that unable to sleep, has not been taking medications. Pt is aggressive in the triage and the lobby     HPI  Lamine Giron is a 23 year old male with history notable for canide and psychosis who presented to the ED yesterday at the behest of his family. He has presented to the ED on 6/15, 6/21, twice on 6/22, and 6/25 for similar concerns.    On examination, Lamine, who goes by \"Serafin\" was initially walking up and down the hallway while talking about random things. He readily agreed to go to his room and talk, stating that he is always willing to talk to females. He reports that he was tricked by his aunt into coming here by telling him that since he hasn't slept in 20 days, the ED will give him some medicine for sleep and then they can go home. He does not agree with his aunt's assessment of his sleep, as he believes that he has not had any problems with sleep. He denies any changes in his appetite, stating that he eats a lot but he does not gain much weight because he is constantly moving. He walked from [place] to [place] on the highway in the pouring rain because he was expelled from the CD treatment program and he had no way to get to his aunt's place. He " "acknowledged the degree of danger involved in walking on the highway, but he states \"I do it to make myself stronger when I put myself through those obstacles.\" He gave other examples of daring feats he has engaged in that have a similar level of risk. He denies that these are related to suicidal thoughts at all. He denies past or current suicidal ideation, stating that \"my God tells me not to kill myself. He has told me that when I want you to come back to me, I will bring you back myself.\" He states that \"I love my life.\" He likened his life to a jiDataXuaw puzzle with thousands of pieces - \"my life got pulled apart and I'm now putting it back together.\" He denies homicidal ideation.    He denies auditory and visual hallucinations, and expressed frustration at being asked about this over and over. He said that \"Every time you guys ask me about hearing voices and seeing things, you manifest this for me. I feel like you're speaking it into existence.\"     He reports that he has a problem with cocaine, which he has used daily in the past when he had the funds. He called it the \"rich man's drug.\" He does not recall when he last used cocaine, but his drug screens from 6/21 on have been negative. He states that he has also used alcohol, primarily vodka, in the past. He would like to go back to treatment for cocaine, as there is a part of him that wants to stop using it altogether, but there is a part as well that craves the energy that it gives him.     He has not taken medications in a long time because he has not found them helpful, and with paliperidone, he had what sounds like severe dystonia. He did say that the medicine that he has received in the ED has been helpful, and he is willing to continue taking that (Zyprexa) along with gabapentin.    He has not been very cooperative since his arrival to the ED, but has not required locked seclusion or restraints.    Please see HPI by Dr. Boykin and the DEC assessment " "by Kathe Julian for more details.    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  Social History   Social History     Tobacco Use    Smoking status: Every Day     Current packs/day: 1.00     Types: Cigarettes    Smokeless tobacco: Never    Tobacco comments:     Danika turpin   Substance Use Topics    Alcohol use: Never    Drug use: Not Currently      Past medical history, past surgical history, medications, allergies, family history, and social history were reviewed with the patient. No additional pertinent items.      Family history - no known history of mental health or chemical dependency concerns.    Past psychiatric treatment:  Inpatient admission  Chemical dependency residential treatment  Outpatient psychiatry    Past psychiatric medications tried:   Paliperidone - EPS  \"I've tried them all\"     Review of Systems  A medically appropriate review of systems was performed with pertinent positives and negatives noted in the HPI, and all other systems negative.        Physical Examination   BP: 132/87  Pulse: 87  Temp: 98  F (36.7  C)  Resp: 18  SpO2: 100 %    Physical Exam  General: Appears stated age.   Neuro: Alert and fully oriented. Extremities appear to demonstrate normal strength on visual inspection. "   Integumentary/Skin: no rash visualized, normal color    Psychiatric Examination   Appearance: awake, alert, wearing a tank top and pajama bottoms, and untidy  Attitude:  slightly uncooperative  Eye Contact:  fair  Mood:  anxious  Affect:  mood congruent, intensity is heightened, labile, and reactive  Speech:  rambling and hyperverbal  Psychomotor Behavior:  fidgeting and intact station, gait and muscle tone  Thought Process:  circumstantial  Associations:  no loose associations  Thought Content:  no evidence of suicidal ideation or homicidal ideation and obsessions present  Insight:   poor  Judgement:  poor  Oriented to:  time, person, and place  Attention Span and Concentration:  limited  Recent and Remote Memory:  fair  Language: able to name/identify objects without impairment  Fund of Knowledge: intact with awareness of current and past events    ED Course        Labs Ordered and Resulted from Time of ED Arrival to Time of ED Departure   COMPREHENSIVE METABOLIC PANEL - Abnormal       Result Value    Sodium 136      Potassium 3.9      Carbon Dioxide (CO2) 29      Anion Gap 7      Urea Nitrogen 12.1      Creatinine 0.86      GFR Estimate >90      Calcium 9.3      Chloride 100      Glucose 110 (*)     Alkaline Phosphatase 75      AST 31      ALT 37      Protein Total 6.9      Albumin 4.3      Bilirubin Total 0.4     CK TOTAL - Normal         URINE DRUG SCREEN PANEL - Normal    Amphetamines Urine Screen Negative      Barbituates Urine Screen Negative      Benzodiazepine Urine Screen Negative      Cannabinoids Urine Screen Negative      Cocaine Urine Screen Negative      Fentanyl Qual Urine Screen Negative      Opiates Urine Screen Negative      PCP Urine Screen Negative     COVID-19 VIRUS (CORONAVIRUS) BY PCR - Normal    SARS CoV2 PCR Negative     CBC WITH PLATELETS AND DIFFERENTIAL    WBC Count 7.5      RBC Count 4.68      Hemoglobin 14.3      Hematocrit 42.4      MCV 91      MCH 30.6      MCHC 33.7       RDW 13.0      Platelet Count 257      % Neutrophils 61      % Lymphocytes 26      % Monocytes 11      % Eosinophils 2      % Basophils 0      % Immature Granulocytes 0      NRBCs per 100 WBC 0      Absolute Neutrophils 4.6      Absolute Lymphocytes 1.9      Absolute Monocytes 0.8      Absolute Eosinophils 0.2      Absolute Basophils 0.0      Absolute Immature Granulocytes 0.0      Absolute NRBCs 0.0         Assessments & Plan (with Medical Decision Making)   Patient presenting with candie and psychosis. His current episode does not appear to be a function of psychoactive substances as his UDS are negative. His family believes that he is suicidal and that he made an attempt yesterday by trying to jump off the 4th floor, but he denies suicidal thoughts. This may be more consistent with candie, as he has engaged in risky behaviors that he describes as obstacles that he purposefully engages in to make himself stronger. His judgment is impaired and he is not in a position to adequately care for himself safely. He appears to have been mostly uncooperative, but did express willingness to continue to take Zyprexa. He states that the combination of Zyprexa and gabapentin has been helpful with making him feel calm. He agreed to take a lower dose of Zyprexa in the morning, in addition to the 10 mg at bedtime.     Nursing notes reviewed noting no acute issues.     I have reviewed the assessment completed by the [facility].     Discussed the recommendations, including medication changes or adjustments, with the patient/guardian, and they are in agreement. Discussed risks and benefits of proposed medications. Answered their questions regarding the plan and reasonable expectations.       Preliminary Diagnosis:    - Schizoaffective disorder, bipolar type vs. Bipolar 1 disorder, current episode manic, severe with psychosis  - Medication non-adherence  - Cocaine use disorder by history  - Marijuana use by history  - Other housing  problem      Recommendations:    Discussed with Dr. Jose Roe, ED attending, and Yuliana Castillo, [facility].    Recommend inpatient admission for safety and stabilization. He is currently on a 72-hour hold that started on 6/26/24 at 2058 and expires on 7/1/24. He may need a civil commitment filed by tomorrow if he does not show substantial improvement and continues to refuse to sign in voluntarily.    Recommend the following medication regimen:  Olanzapine ODT 5 mg Q AM and 10 mg Q HS for mood stabilization and psychosis  Olanzapine PO or IM 10 mg daily PRN for agitation or aggression.  Gabapentin 300 mg TID for anxiety  Hydroxyzine 50 mg Q 6H PRN for anxiety or agitation  Nicotine patch 21 mg daily   Nicotine gum 4 mg Q1H PRN  Vitamin D3 50 mcg    Consider the following labs: TSH with reflex to T4, vitamin D, vitamin B12, folate    Continue to consult psychiatry as needed.      Attestation:  Patient time: 20 minutes  Team time:15 minutes  Chart review: 45 minutes  Documentation: 30 minutes  Total time: 110 minutes on the date of the encounter.        I have provided critical care services at the bedside in the [facility] adult emergency department, evaluating the patient, reviewing notes and laboratory values and directing care. I have discussed recommendation regarding whether or not hospitalization is needed and recommendations for medications and laboratory testing with the attending emergency department provider.       Disclaimer: This note consists of symbols derived from keyboarding, dictation, and/or voice recognition software. As a result, there may be errors in the script that have gone undetected.  Please consider this when interpreting information found in the chart.    --  MIGUEL Medina [facility] EMERGENCY DEPARTMENT  June 27, 2024

## 2024-06-27 NOTE — ED NOTES
Patient in a room now, more calm. Given food. On arrival into the ED, patient was loud, resisting attempts at safety search but then allowed one certain PA to complete it.

## 2024-06-27 NOTE — ED NOTES
IP MH Referral Acuity Rating Score (RARS)    LMHP complete at referral to IP MH, with DEC; and, daily while awaiting IP MH placement. Call score to PPS.  CRITERIA SCORING   New 70 HH and Involuntary for IP MH (not adolescent) 1/1   Boarding over 24 hours 0/1   Vulnerable adult at least 55+ with multiple co morbidities; or, Patient age 11 or under 0/1   Suicide ideation without relief of precipitating factors 1/1   Current plan for suicide 0/1   Current plan for homicide 0/1   Imminent risk or actual attempt to seriously harm another without relief of factors precipitating the attempt 0/1   Severe dysfunction in daily living (ex: complete neglect for self care, extreme disruption in vegetative function, extreme deterioration in social interactions) 1/1   Recent (last 2 weeks) or current physical aggression in the ED 1/1   Restraints or seclusion episode in ED 0/1   Verbal aggression, agitation, yelling, etc., while in the ED 1/1   Active psychosis with psychomotor agitation or catatonia 1/1   Need for constant or near constant redirection (from leaving, from others, etc).  1/1   Intrusive or disruptive behaviors 1/1   TOTAL Acuity Total Score: 8     MARTINEZ Ramos

## 2024-06-27 NOTE — ED TRIAGE NOTES
Triage Assessment (Adult)       Row Name 06/26/24 2013          Triage Assessment    Airway WDL WDL        Respiratory WDL    Respiratory WDL WDL        Skin Circulation/Temperature WDL    Skin Circulation/Temperature WDL WDL        Cardiac WDL    Cardiac WDL WDL        Peripheral/Neurovascular WDL    Peripheral Neurovascular WDL WDL        Cognitive/Neuro/Behavioral WDL    Cognitive/Neuro/Behavioral WDL WDL

## 2024-06-27 NOTE — ED PROVIDER NOTES
ED Provider Note  [facility]      History     Chief Complaint   Patient presents with    Agitation     Pt brought in by family. Pt reports that unable to sleep, has not been taking medications. Pt is aggressive in the triage and the lobby     HPI  Lamine Giron is a 23 year old male who presents to the emergency room once again after recent discharge from the emergency room he now continues to have increased agitation and aggression with family.  Patient has made statements of wanting to jump from the fourth floor building had a chair near the window.  Family is concerned for his safety.  Patient is increasingly agitated and paranoid has not been taking any of his medications.  Presents to the emergency room with increased aggression.    Past Medical History  Past Medical History:   Diagnosis Date    Bipolar affective disorder (H) 09/2022    Constipation 01/29/2020    Extrapyramidal and movement disorder 09/2022    Medication management 09/2022    Paranoid state (H) 07/15/2022    Psychosis (H)     Substance abuse (H)      Past Surgical History:   Procedure Laterality Date    APPENDECTOMY       acetaminophen (TYLENOL) 500 MG tablet  gabapentin (NEURONTIN) 300 MG capsule  naproxen (NAPROSYN) 500 MG tablet      Allergies   Allergen Reactions    Paliperidone Other (See Comments)     Other reaction(s): Extrapyramidal Symptoms   Significant EPS    Significant EPS    Pork Allergy Other (See Comments)     [employer] prohibition     Family History  No family history on file.  Social History   Social History     Tobacco Use    Smoking status: Every Day     Current packs/day: 1.00     Types: Cigarettes    Smokeless tobacco: Never    Tobacco comments:     Danika turpin   Substance Use Topics    Alcohol use: Never    Drug use: Not Currently      A medically appropriate review of systems was performed with pertinent positives and negatives noted in the HPI, and all other systems  negative.    Physical Exam      Physical Exam  Constitutional:       General: He is not in acute distress.     Appearance: Normal appearance. He is not toxic-appearing.   HENT:      Head: Atraumatic.   Eyes:      General: No scleral icterus.     Conjunctiva/sclera: Conjunctivae normal.   Cardiovascular:      Rate and Rhythm: Normal rate.      Heart sounds: Normal heart sounds.   Pulmonary:      Effort: Pulmonary effort is normal. No respiratory distress.      Breath sounds: Normal breath sounds.   Abdominal:      Palpations: Abdomen is soft.      Tenderness: There is no abdominal tenderness.   Musculoskeletal:         General: No deformity.      Cervical back: Neck supple.   Skin:     General: Skin is warm.   Neurological:      General: No focal deficit present.      Mental Status: He is alert.      Sensory: No sensory deficit.      Motor: No weakness.      Coordination: Coordination normal.   Psychiatric:         Mood and Affect: Mood is anxious.         Speech: Speech is rapid and pressured.         Behavior: Behavior is agitated.         Thought Content: Thought content is paranoid and delusional. Thought content includes suicidal ideation. Thought content includes suicidal plan.         Judgment: Judgment is impulsive.           ED Course, Procedures, & Data      Procedures       Results for orders placed or performed during the hospital encounter of 06/25/24   Urine Drug Screen Panel     Status: Normal   Result Value Ref Range    Amphetamines Urine Screen Negative Screen Negative    Barbituates Urine Screen Negative Screen Negative    Benzodiazepine Urine Screen Negative Screen Negative    Cannabinoids Urine Screen Negative Screen Negative    Cocaine Urine Screen Negative Screen Negative    Fentanyl Qual Urine Screen Negative Screen Negative    Opiates Urine Screen Negative Screen Negative    PCP Urine Screen Negative Screen Negative   Urine Drug Screen     Status: Normal    Narrative    The following orders  were created for panel order Urine Drug Screen.  Procedure                               Abnormality         Status                     ---------                               -----------         ------                     Urine Drug Screen Panel[[id number]]      Normal              Final result                 Please view results for these tests on the individual orders.     Medications - No data to display  Labs Ordered and Resulted from Time of ED Arrival to Time of ED Departure - No data to display  No orders to display      Labs pending at time of dictation    Critical care was not performed.     Medical Decision Making  The patient's presentation was of high complexity (a chronic illness severe exacerbation, progression, or side effect of treatment).    The patient's evaluation involved:  an assessment requiring an independent historian (patient's family members able to give collateral information discussing patient's continued risk of harm.)  ordering and/or review of 3+ test(s) in this encounter (see separate area of note for details)  discussion of management or test interpretation with another health professional (patient was seen by independent health professional with full DEC assessment at this time we reviewed records and after seeing patient's decompensation here he will be placed on a 72-hour hold and admitted.)    The patient's management necessitated high risk (a decision regarding hospitalization).    Assessment & Plan        I have reviewed the nursing notes. I have reviewed the findings, diagnosis, plan and need for follow up with the patient.    Patient with history of schizoaffective disorder bipolar type presenting now with increasing candie demonstrating suicidal ideation and agitation with increased risk to himself and others at home patient had just been seen under observation however continues to have escalation of behaviors and I believe at this time will be best served with inpatient  psychiatric stabilization and returning back onto his medications.    Final diagnoses:   Schizoaffective disorder, bipolar type (H)   Itzel (H)   Suicidal ideation   Agitation       Sanford Boykin  [facility] EMERGENCY DEPARTMENT  6/26/2024     Sanford Boykin MD  06/26/24 2052

## 2024-06-27 NOTE — ED NOTES
1219 pt was observed laying in bed watching TV on his phone pt informed of zyprexa order pt declines meds states he feels that he is calm right now and doesnt need it.

## 2024-06-27 NOTE — TELEPHONE ENCOUNTER
[place]: [place]  Access Inpatient Bed Call Log  6/27/2024 12:15 AM  Intake has called facilities that have not updated their bed status within the last 12 hours.??      ADULTS:     *[place]  [place] -- [facility]: @ cap per website.  [place] -- [facility]:  @ Cap per website.    [place] -- [facility]: @ Cap per website.  [place] -- [facility]: @ Cap per website.   [place] -- [facility]: @ Cap per website.  [facility] -- [facility]: @ Posting 2 beds.   [place] -- [facility]/ beds @ Posting 3 beds. Ages 18-28, Voluntary only, COVID test req'd, NO aggression, physical or sexual assault, violence hx or drug abuse, or psychosis   [place] -- [facility]: @ Cap per website.  [place] -- [facility]: @ cap per website.  [place] -- [facility]:  @ Cap per website.      *[facility]: @ Posting 9 beds.  Mixed unit/Low acuity only.    [facility]: @ Cap per website. Low acuity, No aggression   [facility]: @ cap per website.  [facility]:  @ Posting 1 bed (female). Low acuity only. No current aggression.  [facility]:  @ Posting 1 bed. COVID negative test req. Lower acuity only. No Aggression.  [facility]: @ Posting 2 beds. Low acuity only. Prefer med adjustments placement.  . No aggression   [facility]:  @ Posting 4 beds. COVID negative test req. Lower acuity only. No Aggression.   [place] -- [facility]: @Cap per website. NO reviews after 10PM. Low acuity only.    [place] -- [facility]: @ Posting 10 bed. No hx of aggression. No sexual offenders. Voluntary patients only   [facility]:  @ Posting 2 beds. Low acuity only. Must have the cognitive ability to do programming. No aggressive or violent behavior or recent HX in the last 2 yrs. MH must be primary.    [place] -- [facility]: @ Posting 3 beds COVID negative test. Must be low acuity ONLY.   [place] -- [facility]: @ Posting 3 beds. Low acuity. Negative  Covid. -12:29 AM Per Tanya, they are capped.  [place] -- [facility]: @Posting 4 (Low acuity) beds. Negative COVID test    [facility]: @ Posting 4 bed. No hx of aggression/assault. No lines, drains or tubes. Does not provide detox or CD treatment.   [place] -- [facility]: @ Posting 5 beds. Mixed unit/Low acuity/no medical devices - IV, CPAP etc. Negative Covid.). .      Pt remains on waitlist pending appropriate placement availability.

## 2024-06-27 NOTE — ED NOTES
Per staff pt was out in the HW without a shirt was pushing against security to leave and was stating he needed to leave. Pt was escorted back to his room. Pt was yelling. Pt refusing oral medications still pt in his room perseverating about leaving to go smoke a vape or something something. Pt agreeable to IM medications.

## 2024-06-27 NOTE — PROGRESS NOTES
Lamine HARLEY [place]  June 26, 2024  Plan of Care Hand-off Note     Patient Care Path: inpatient mental health    Plan for Care:   It is the recommendation of this writer that pt be admitted to an inpatient psychiatric unit on the basis of his candie, psychosis, and suicidal ideation. Pt had an interrupted suicide attempt yesterday where his family stopped him from jumping out of a 4th story window. He is not sleeping or eating per collateral report. He tells this writer that the [employer], FBI, and [place] [employer] are following him. He also believes that President [place] is involved in the conspiracy against him. Collateral report indicates that pt has also been making comments about the end of the world and bloodshed. Pt was placed on a 72 HH.    Identified Goals and Safety Issues: decrease candie, psychosis, and suicidal ideation    Overview:  Ivette William, aunt, PH: [phone]  Charmaine Goode, mother, PH: [phone] (needs )    Legal Status at Admission: 72 Hour Hold  72 Hour Hold - Date/Time Initiated: 06/26/24 @ 20:58  72 Hour Hold - Date/Time Ends: (P) 07/01/24 @ 20:58    Psychiatry Consult: A psychiatry consult has been placed.     Updated MD and RN regarding plan of care.      MARTINEZ Ramos

## 2024-06-27 NOTE — CONSULTS
"Diagnostic Evaluation Consultation  Crisis Assessment    Patient Name: Lamine Giron  Age:  23 year old  Legal Sex: male  Gender Identity: male  Race: Black or   Ethnicity: Not  or   Language: English      Patient was assessed: In person   Crisis Assessment Start Date: 06/26/24  Crisis Assessment Start Time: 2040  Crisis Assessment Stop Time: 2110  Patient location: [facility] EMERGENCY DEPARTMENT                             ED16C    Referral Data and Chief Complaint  Lamine Giron presents to the ED with family/friends (with aunt and mother). Patient is presenting to the ED for the following concerns: Suicidal ideation, Paranoia, Other (see comment) (candie and psychotic symptoms).   Factors that make the mental health crisis life threatening or complex are:  Pt presents to the ED with his aunt and mother for concerns of increasing candie with psychosis. He was seen in the ED on 06/25 where concerns for his altered mental status were noted. Pt was placed on observation status and then subsequently discharged. Upon discharge, pt's family observed that his mental health symptoms were continuing to increase and returned him to the hospital. See collateral information below for additional information. Upon assessment, pt presents as floridly manic and psychotic with intense eye contact. His speech is loud and hyperverbal and his mood is euphoric. He tells this writer that his aunt is a \"hater\" who recently \"exposed herself to him by turning on him\" and bringing him back to the hospital. There are places he would much rather be like [place] [employer] and [facility]. Pt adamantly denies any mental health concerns, including AH/VH, SI, HI, HARVEY, or sleep or appetite concerns. (However, collateral report indicates that pt attempted to jump out of a 4th story window yesterday, has not been eating or sleeping, is experiencing AH, and has a history of HARVEY.) Pt says he is " "\"sleeping very well, sleeping every night\" and has a \"solider mentality\" where he burns himself out and then goes directly to sleep. He also says that he likes to \"eat all day and all night\" and that \"good food equals good mood\". When asked if this writer can ask pt additional questions, he says, \"I'll tell you anything you want to know, girl\". Pt states that \"people are watching him suffer in silence\". He feels that he \"wants to attack\" and is \"close to bursting out of character\". He says that he is \"about to blow up like a bomb\" and wants to get as far away from his family as possible. He then reports that the JEAN, FBI, and [employer] are following him. He does not know why as he is \"not a pop or rap star\". He recently found a \"clue\" in his cousin's backpack, which was his cousin's passport with the words [employer] on it. He tells this writer that [employer] recently showed up in his bedroom and he needed to secure the area. Pt asks this writer if she knows the JEAN and if he can meet them. Pt also believes that President [place] is involved in the conspiracy against him and says that, if he dies, the president will be to blame. Pt also accuses the attending provider of being involved. Pt appears to have little insight into his mental health, asking if he can go to [facility] to  his gabapentin prescription..      Informed Consent and Assessment Methods  Explained the crisis assessment process, including applicable information disclosures and limits to confidentiality, assessed understanding of the process, and obtained consent to proceed with the assessment.  Assessment methods included conducting a formal interview with patient, review of medical records, collaboration with medical staff, and obtaining relevant collateral information from family and community providers when available.  : done     Patient response to interventions: needs reinforcement  Coping skills were attempted " to reduce the crisis:  Pt engaged in a conversation with this writer about his mental health.     History of the Crisis   Pt has recently been seen in the ED several times for deteriorating mental health on 6/15, 6/21, 6/22 (x2), and 6/25. Pt carries a diagnosis of schizoaffective disorder - bipolar type and it has become increasingly apparent that pt is experiencing an acute episode of candie with psychosis. Per collateral report, pt was hospitalized at [facility] 1-2 years ago and started on an injection medication. He discontinued his medication and has not been on medication in several months to a year. He has no established mental healthcare providers at present. He is currently living with his aunt in [place], [place].    Brief Psychosocial History  Family:  Single, Children no  Support System:  Parent(s), Other (specify) (aunt)  Employment Status:  unemployed  Source of Income:  none  Financial Environmental Concerns:  unemployed  Current Hobbies:  sports/team sports, television/movies/videos  Barriers in Personal Life:  mental health concerns    Significant Clinical History  Current Anxiety Symptoms:     Current Depression/Trauma:  thoughts of death/suicide (SI reported by collateral contact)  Current Somatic Symptoms:     Current Psychosis/Thought Disturbance:  inattentive, hyperactive, displaces blame, impulsive, agitation, elated mood, grandiosity, hyperverbal, distractability, high risk behavior, auditory hallucinations (AH reported by collateral contact)  Current Eating Symptoms:  loss of appetite (loss of appetite reported by collateral contact)  Chemical Use History:  Alcohol: None  Benzodiazepines: None  Opiates: None  Cocaine: None  Marijuana: Other (comments) (history of marijuana noted in chart review)  Last Use::  (unknown last date of use)  Other Use: None   Past diagnosis:  Other, Substance Use Disorder (schizoaffective disorder - bipolar type)  Family history:  No known history of mental health or  chemical health concerns  Past treatment:  Inpatient Hospitalization, Psychiatric Medication Management, Other (HARVEY treatment)  Details of most recent treatment:  Pt has no established mental healthcare providers at present. He recently attended HARVEY treatment in [place], [place].  Other relevant history:  No other relevant history.       Collateral Information  Is there collateral information: Yes     Collateral information name, relationship, phone number:  Ivette William, aunt, PH: [phone]    What happened today: Pt's aunt and mother brought him back to the ED this evening for concerns of further decompensation of his mental illness.     What is different about patient's functioning: Pt lives with his aunt. He recently had a fight with this sister and police were called. They did not know that he has a mental illness and now there are legal proceedings. Pt's mental health has been deteriorating over the past few months with significant decline over the past 3 weeks. He is not eating, sleeping, or showering, which is why he looks disheveled. His family has been persuading him to drink fluids but he often stops himself from doing so. He paces back and forth at night screaming. He is talking about death and says that he is going to die soon. He is hearing voices and responding to them. He has been talking about demons. He is talking incessantly. Yesterday, he locked himself in a room screaming at the top of his lungs as if he was talking to 50 different people when he was the only one in the room. He was getting louder and louder. He said that he was going to jump from the 4th floor of the building. He took a chair into the room and said that he was going to jump. His family thought that he was going to stand on the chair and jump from the window. He has been saying that that he wants to die, does not belong her, and it would be easier if he jumped. His aunt called EMS who brought him to [facility] last night (6/25).  "He was discharged today much to his aunt's concern. His aunt picked him up and he had further declined. He told his aunt that Lamine is not here anymore and that a \"J man\" is talking to her. He also said that that the world is ending and that there will be bloodshed. Before coming into triage this evening, he was running in the street close to cars screaming and half-naked. Pt has not taken psychiatric medication in a few months to a year. He was last hospitalized at [facility] 1-2 years ago. He has a history of substance use.     Concern about alcohol/drug use: Pt has a history of substance use, although his aunt does not know the details of his use.    What do you think the patient needs: Pt needs inpatient hospitalization.      Has patient made comments about wanting to kill themselves/others: yes    If d/c is recommended, can they take part in safety/aftercare planning:  yes    Additional collateral information:  No additional collateral information.     Risk Assessment  [place] Suicide Severity Rating Scale Recent: 6/26/24  Suicidal Ideation (Recent)  Q1 Wished to be Dead (Past Month): yes  Q2 Suicidal Thoughts (Past Month): yes  Q3 Suicidal Thought Method: yes  Q4 Suicidal Intent without Specific Plan: yes  Q5 Suicide Intent with Specific Plan: yes (Per collateral report, pt was threatening to jump from the 4th story of his apartment yesterday. He moved a chair closer to the window stating that he was going to jump.)  Level of Risk per Screen: high risk  Intensity of Ideation (Recent)  Most Severe Ideation Rating (Past 1 Month): 5  Frequency (Past 1 Month):  (unable to assess as pt is attempting to minimize the extent of his psychiatric symptoms)  Duration (Past 1 Month):  (unable to assess as pt is attempting to minimize the extent of his psychiatric symptoms)  Controllability (Past 1 Month):  (unable to assess as pt is attempting to minimize the extent of his psychiatric symptoms)  Deterrents (Past 1 Month):  " (unable to assess as pt is attempting to minimize the extent of his psychiatric symptoms)  Reasons for Ideation (Past 1 Month):  (unable to assess as pt is attempting to minimize the extent of his psychiatric symptoms)  Suicidal Behavior (Recent)  Actual Attempt (Past 3 Months): No  Total Number of Actual Attempts (Past 3 Months): 0  Has subject engaged in non-suicidal self-injurious behavior? (Past 3 Months): No  Interrupted Attempts (Past 3 Months): Yes  Total Number of Interrupted Attempts (Past 3 Months): 1  Interrupted Attempt Description (Past 3 Months): Pt moved a chair close to the 4th story of his apartment window yesterday stating that he was going to jump. His family was able to intervene to stop him from jumping.  Aborted or Self-Interrupted Attempt (Past 3 Months): No  Total Number of Aborted or Self-Interrupted Attempts (Past 3 Months): 0  Preparatory Acts or Behavior (Past 3 Months): No  Total Number of Preparatory Acts (Past 3 Months): 0    Environmental or Psychosocial Events: legal issues such as DWI, DUI, lawsuit, CPS involvement, etc., challenging interpersonal relationships, impulsivity/recklessness, neither working nor attending school (Pt has pending legal proceedings for threatening his sister per collateral report.)  Protective Factors: Protective Factors: strong bond to family unit, community support, or employment, lives in a responsibly safe and stable environment    Does the patient have thoughts of harming others? Feels Like Hurting Others: no  Previous Attempt to Hurt Others: no  Current presentation: Irritable  Violence Threats in Past 6 Months: Pt denies making any violent threats in the past 6 months, although there are apparently legal proceedings for him threatening his sister.  Current Violence Plan or Thoughts: Pt denies HI.  Is the patient engaging in sexually inappropriate behavior?: no  Duty to warn initiated: no  Duty to warn details: No duty to warn needed at this  time.    Is the patient engaging in sexually inappropriate behavior?  no        Mental Status Exam   Affect: Dramatic  Appearance: Disheveled  Attention Span/Concentration: Other (please comment) (variable)  Eye Contact: Intense    Fund of Knowledge: Other (please comment) (variable)   Language /Speech Content: Fluent  Language /Speech Volume: Loud  Language /Speech Rate/Productions: Hyperverbal, Pressured  Recent Memory: Variable  Remote Memory: Intact  Mood: Irritable  Orientation to Person: Yes   Orientation to Place: Yes  Orientation to Time of Day: Yes  Orientation to Date: Yes     Situation (Do they understand why they are here?): Answer (please comment) (partial understanding)  Psychomotor Behavior: Agitated  Thought Content: Delusions, Hallucinations, Suicidal, Paranoia  Thought Form: Paranoia, Tangential     Medication  Psychotropic medications:   Medication Orders - Psychiatric (From admission, onward)      Start     Dose/Rate Route Frequency Ordered Stop    06/26/24 2200  OLANZapine zydis (zyPREXA) ODT tab 10 mg        Note to Pharmacy: DO NOT USE THIS FIELD FOR ADMIN INSTRUCTIONS; INFORMATION DOES NOT SHOW ON MAR. USE THE FIELD ABOVE MARKED ADMIN INSTRUCTIONS    10 mg Oral AT BEDTIME 06/26/24 2053 06/26/24 2150  nicotine (NICODERM CQ) 21 MG/24HR 24 hr patch 1 patch         1 patch  over 24 Hours Transdermal ONCE 06/26/24 2147               Current Care Team  Patient Care Team:  No Ref-Primary, Physician as PCP - General    Diagnosis  Patient Active Problem List   Diagnosis Code    Anxiety F41.9    Adjustment disorder with anxious mood F43.22    Polysubstance abuse (H) F19.10    Manic behavior (H) F30.10    Psychosis (H) F29    Bipolar affective disorder, current episode manic (H) F31.10    Schizoaffective disorder, bipolar type (H) F25.0       Primary Problem This Admission  Active Hospital Problems    Schizoaffective disorder, bipolar type (H) F25.0    Clinical Summary and Substantiation of  Recommendations   It is the recommendation of this writer that pt be admitted to an inpatient psychiatric unit on the basis of his candie, psychosis, and suicidal ideation. Pt had an interrupted suicide attempt yesterday where his family stopped him from jumping out of a 4th story window. He is not sleeping or eating per collateral report. He tells this writer that the JEAN, FBI, and [employer] are following him. He also believes that President [place] is involved in the conspiracy against him. Collateral report indicates that pt has also been making comments about the end of the world and bloodshed. Pt was placed on a 72 HH.       Imminent risk of harm: Suicidal Behavior  Severe psychiatric, behavioral or other comorbid conditions are appropriate for management at inpatient mental health as indicated by at least one of the following: Psychiatric Symptoms, Impaired impulse control, judgement, or insight, Symptoms of impact to function  Severe dysfunction in daily living is present as indicated by at least one of the following: Incapacitation because of grave disability, Extreme disruption in vegetative function, Extreme deterioration in social interactions, Complete inability to maintain any appropriate aspect of personal responsibility in any adult roles  Situation and expectations are appropriate for inpatient care: Around-the-clock medical and nursing care to address symptoms and initiate intervention is required, Voluntary treatment at lower level of care is not feasible, Patient is unwilling to participate in treatment voluntarily and requires treatment, Patient management/treatment at lower level of care is not feasible or is inappropriate  Inpatient mental health services are necessary to meet patient needs and at least one of the following: Specific condition related to admission diagnosis is present and judged likely to deteriorate in absence of treatment at proposed level of care      Patient coping  skills attempted to reduce the crisis:  Pt engaged in a conversation with this writer about his mental health.    Disposition  Recommended disposition: Inpatient Mental Health        Reviewed case and recommendations with attending provider. Attending Name: Dr. Boykin       Attending concurs with disposition: yes       Patient and/or validated legal guardian concurs with disposition:   yes       Final disposition:  inpatient mental health    Legal status on admission: 72 Hour Hold    Assessment Details   Total duration spent with the patient: 30 min     CPT code(s) utilized: 89078 - Psychotherapy for Crisis - 60 (30-74*) min    MARTINEZ Ramos, Psychotherapist  [facility] - Triage & Transition Services  Callback: [phone]
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
## 2024-06-27 NOTE — MEDICATION SCRIBE - ADMISSION MEDICATION HISTORY
Medication Scribe Admission Medication History    Admission medication history is complete. The information provided in this note is only as accurate as the sources available at the time of the update.    Information Source(s): Hospital records and CareEverywhere/SureScripts via N/A    Pertinent Information: No dispense HX. Pt unwilling to talk. Pt has been in and out of ED. PTA list based on discharge meds from several past encounters- most recent being yesterday. Unlikely pt is using medications outside of the hospital.     Changes made to PTA medication list:  Added: None  Deleted: None  Changed: None    Allergies reviewed with patient and updates made in EHR: yes    Medication History Completed By: Kayy Torres 6/27/2024 11:48 AM    PTA Med List   Medication Sig Last Dose    acetaminophen (TYLENOL) 500 MG tablet Take 2 tablets (1,000 mg) by mouth every 8 hours as needed for mild pain Unknown    gabapentin (NEURONTIN) 300 MG capsule Take 1 capsule (300 mg) by mouth every 8 hours as needed for neuropathic pain or other (right leg pain) Unknown    naproxen (NAPROSYN) 500 MG tablet Take 1 tablet (500 mg) by mouth 2 times daily (with meals) for 7 days Unknown

## 2024-06-28 PROCEDURE — 250N000013 HC RX MED GY IP 250 OP 250 PS 637: Performed by: FAMILY MEDICINE

## 2024-06-28 PROCEDURE — 250N000013 HC RX MED GY IP 250 OP 250 PS 637: Performed by: EMERGENCY MEDICINE

## 2024-06-28 PROCEDURE — 250N000013 HC RX MED GY IP 250 OP 250 PS 637: Performed by: PSYCHIATRY & NEUROLOGY

## 2024-06-28 PROCEDURE — 90853 GROUP PSYCHOTHERAPY: CPT

## 2024-06-28 PROCEDURE — 124N000002 HC R&B MH UMMC

## 2024-06-28 PROCEDURE — H2032 ACTIVITY THERAPY, PER 15 MIN: HCPCS

## 2024-06-28 PROCEDURE — 99223 1ST HOSP IP/OBS HIGH 75: CPT | Performed by: PSYCHIATRY & NEUROLOGY

## 2024-06-28 RX ORDER — HALOPERIDOL 5 MG/ML
10 INJECTION INTRAMUSCULAR EVERY 6 HOURS PRN
Status: DISCONTINUED | OUTPATIENT
Start: 2024-06-28 | End: 2024-06-28

## 2024-06-28 RX ORDER — LORAZEPAM 2 MG/1
2 TABLET ORAL EVERY 8 HOURS PRN
Status: DISCONTINUED | OUTPATIENT
Start: 2024-06-28 | End: 2024-07-08

## 2024-06-28 RX ORDER — HALOPERIDOL 5 MG/ML
5 INJECTION INTRAMUSCULAR EVERY 8 HOURS PRN
Status: DISCONTINUED | OUTPATIENT
Start: 2024-06-28 | End: 2024-08-09 | Stop reason: HOSPADM

## 2024-06-28 RX ORDER — DIPHENHYDRAMINE HYDROCHLORIDE 50 MG/ML
50 INJECTION INTRAMUSCULAR; INTRAVENOUS EVERY 8 HOURS PRN
Status: DISCONTINUED | OUTPATIENT
Start: 2024-06-28 | End: 2024-08-09 | Stop reason: HOSPADM

## 2024-06-28 RX ORDER — DIPHENHYDRAMINE HCL 50 MG
50 CAPSULE ORAL EVERY 6 HOURS PRN
Status: DISCONTINUED | OUTPATIENT
Start: 2024-06-28 | End: 2024-06-28

## 2024-06-28 RX ORDER — LORAZEPAM 2 MG/1
2 TABLET ORAL EVERY 6 HOURS PRN
Status: DISCONTINUED | OUTPATIENT
Start: 2024-06-28 | End: 2024-06-28

## 2024-06-28 RX ORDER — DIPHENHYDRAMINE HCL 50 MG
50 CAPSULE ORAL EVERY 8 HOURS PRN
Status: DISCONTINUED | OUTPATIENT
Start: 2024-06-28 | End: 2024-08-09 | Stop reason: HOSPADM

## 2024-06-28 RX ORDER — DIPHENHYDRAMINE HYDROCHLORIDE 50 MG/ML
50 INJECTION INTRAMUSCULAR; INTRAVENOUS EVERY 6 HOURS PRN
Status: DISCONTINUED | OUTPATIENT
Start: 2024-06-28 | End: 2024-06-28

## 2024-06-28 RX ORDER — NICOTINE 21 MG/24HR
1 PATCH, TRANSDERMAL 24 HOURS TRANSDERMAL DAILY
Status: DISCONTINUED | OUTPATIENT
Start: 2024-06-28 | End: 2024-08-09 | Stop reason: HOSPADM

## 2024-06-28 RX ORDER — METHOCARBAMOL 500 MG/1
500 TABLET, FILM COATED ORAL 4 TIMES DAILY PRN
Status: DISCONTINUED | OUTPATIENT
Start: 2024-06-28 | End: 2024-08-09 | Stop reason: HOSPADM

## 2024-06-28 RX ORDER — HALOPERIDOL 5 MG/1
5 TABLET ORAL EVERY 8 HOURS PRN
Status: DISCONTINUED | OUTPATIENT
Start: 2024-06-28 | End: 2024-08-09 | Stop reason: HOSPADM

## 2024-06-28 RX ORDER — LORAZEPAM 2 MG/ML
2 INJECTION INTRAMUSCULAR EVERY 8 HOURS PRN
Status: DISCONTINUED | OUTPATIENT
Start: 2024-06-28 | End: 2024-07-08

## 2024-06-28 RX ORDER — HALOPERIDOL 10 MG/1
10 TABLET ORAL EVERY 6 HOURS PRN
Status: DISCONTINUED | OUTPATIENT
Start: 2024-06-28 | End: 2024-06-28

## 2024-06-28 RX ORDER — LORAZEPAM 2 MG/ML
2 INJECTION INTRAMUSCULAR EVERY 6 HOURS
Status: DISCONTINUED | OUTPATIENT
Start: 2024-06-28 | End: 2024-06-28

## 2024-06-28 RX ADMIN — NICOTINE 1 PATCH: 21 PATCH, EXTENDED RELEASE TRANSDERMAL at 16:40

## 2024-06-28 RX ADMIN — LORAZEPAM 2 MG: 2 TABLET ORAL at 19:06

## 2024-06-28 RX ADMIN — GABAPENTIN 300 MG: 300 CAPSULE ORAL at 08:21

## 2024-06-28 RX ADMIN — NICOTINE POLACRILEX 4 MG: 4 GUM, CHEWING BUCCAL at 13:49

## 2024-06-28 RX ADMIN — HYDROXYZINE HYDROCHLORIDE 25 MG: 25 TABLET ORAL at 16:54

## 2024-06-28 RX ADMIN — NICOTINE POLACRILEX 4 MG: 4 GUM, CHEWING BUCCAL at 16:43

## 2024-06-28 RX ADMIN — DIPHENHYDRAMINE HYDROCHLORIDE 50 MG: 50 CAPSULE ORAL at 19:06

## 2024-06-28 RX ADMIN — METHOCARBAMOL 500 MG: 500 TABLET ORAL at 12:00

## 2024-06-28 RX ADMIN — METHOCARBAMOL 500 MG: 500 TABLET ORAL at 16:54

## 2024-06-28 RX ADMIN — ACETAMINOPHEN 650 MG: 325 TABLET, FILM COATED ORAL at 08:20

## 2024-06-28 RX ADMIN — HALOPERIDOL 5 MG: 5 TABLET ORAL at 19:06

## 2024-06-28 RX ADMIN — OLANZAPINE 10 MG: 10 TABLET, ORALLY DISINTEGRATING ORAL at 20:27

## 2024-06-28 RX ADMIN — GABAPENTIN 300 MG: 300 CAPSULE ORAL at 13:49

## 2024-06-28 RX ADMIN — HYDROXYZINE HYDROCHLORIDE 50 MG: 50 TABLET, FILM COATED ORAL at 11:59

## 2024-06-28 RX ADMIN — GABAPENTIN 300 MG: 300 CAPSULE ORAL at 20:13

## 2024-06-28 ASSESSMENT — ACTIVITIES OF DAILY LIVING (ADL)
DRESS: INDEPENDENT
ADLS_ACUITY_SCORE: 28
DRESS: INDEPENDENT
ADLS_ACUITY_SCORE: 28
ORAL_HYGIENE: INDEPENDENT
ADLS_ACUITY_SCORE: 28
LAUNDRY: UNABLE TO COMPLETE
ADLS_ACUITY_SCORE: 28
HYGIENE/GROOMING: INDEPENDENT
HYGIENE/GROOMING: INDEPENDENT
ADLS_ACUITY_SCORE: 28
ORAL_HYGIENE: INDEPENDENT
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
## 2024-06-28 NOTE — PLAN OF CARE
Number of patients attending the group:  8  Group Length:  1 Hour     Group Psychotherapy      Summary of Group / Topics Discussed:  The goal of psychotherapy is to promote insight to positive choice and change. Group processing was within a supportive and safe environment. Patients processed emotions using verbal group and expressive psychotherapy interventions.       Assessment:  Pt participated in a group that focused on insight orientation, followed by prolonged engagements and ideas sharing on ways to problem-solve disagreements between providers and patients that could impact patient willingness to follow through with treatment recommendations. Discussed criteria for several diagnoses including bipolar affective disorder and major depressive disorder, based on pt questions. Provided guided clarifications. Group members shared ideas on ways to cope with different symptoms, while facilitator provided directions and re-directions, where necessary.     Patient Response: Patient participated in this group session. Asked questions and reported current symptoms. Voiced understanding of content and reported willingness to follow through with recommendations. Reported that the group was very helpful.

## 2024-06-28 NOTE — PLAN OF CARE
INITIAL PSYCHOSOCIAL ASSESSMENT AND NOTE    Information for assessment was obtained from:       []Patient     []Parent     []Community provider    []Hospital records   [x]Other     []Guardian    Patient is too disorganized for interview. At this time there is some potential for a code.  Collateral gathered from aunt.    Presenting Problem:  Patient is a 23 year old male who uses . Patient was admitted to [facility] on 6/26/2024 [address] .    Presenting issues and presentation for admit:   Pt was picked up by police for disruptive behaviors.  Pt was disruptive and agitated in the ER.    72 Hour Hold - Date/Time Initiated: 06/26/24 @ 20:58  72 Hour Hold - Date/Time Ends: 07/01/24 @ 20:58       I called [facility] and they said that [place] called yesterday and they are not the county of financial responsibility.  They told me to call [place] or [place].  I had to leave a vm at [facility]. They did take the petition. THey interviewed him and staffed it. They will make a final decision on Monday morning.  [facility]  PPS  P: [phone]  F: [phone]      The following areas have been assessed:    History of Mental Health and Chemical Dependency:  Mental Health History:  Patient has a historical diagnosis of   Schizoaffective do  Polysubstance abuse disorder  1. Homelessness Z59.00   2. Insomnia, unspecified type G47.00   psychiatric disorder NOS, personality disorder NOS, abnormal behavior NOS, substance abuse, polysubstance abuse, candie, bipolar disorder   .   Abnormal behavior  R46.89         Will need patient to sign [facility] auth tos ee these records.      The patient has not a history of suicide attempts .   Patient  has not a history of engaged in non-suicidal self-injury .     Previous psychiatric hospitalizations and treatments (including outpatient, residential, and inpatient care:  6/27/24 to present @ [facility]  [facility] [address]  6/25/24 to 6/27/24 to [facility] ER  +5 more ED presentation ins June    Substance Use History  Pt just left a treatment center in [place] that was funded by [facility]  This was requested by [facility].        Patient's current relationship status is   single.   Patient reported having zero child(luis e).       Family Description (Constellation, significant information and events, Family Psychiatric History):     Family is from [facility].  Pt was born in [place], [place]  Parents are ,  Siblings live if father in [place].  Father should be the contact: Subhash @ [phone]    Mother lives in [facility] [facility]: Taisha Mcnamara    Support System:  Parent(s), Other (specify) (His aunt Nita @ [phone])  When [facility] called Aunt they also spoke with father.    Patient shares that his sister irene told the police that he was going to kill her a couple of weeks ago.   Aunt said they were arguing and sister called he police. They arrested him.    Significant Medical issues, Life events or Trauma history:     None noted      Living Situation:  Patient's current living/housing situation is homelessness. But staying with different family members . They live with aunt in [place] and they report that housing is not stable and they are not able to return upon discharge.       Educational Background:    Patient's highest education level was GED. Patient reports they are  able to understand written materials.     Occupational and Financial Status:   Patient is currently unemployed.  Patient reports  income is obtained through  none .  Patient does identify finances as a current stressor. They are insured under none. Restrictions (No/Yes): no    6/28/24 6/28/24  Per [place], [place] only has eligibility for O[place]: Substance Use Disorder.  Elig Type SA: [facility] [employer] [facility]  Eligibility Begin Date: 06/18/2024  Eligibility End Date: 06/18/2025  There is no coverage for IP Mental  Health admission.   No other coverage found.     Pool message sent to      Occupational History:   Aunt does not know where he has worked and has not worked in a while      Legal Concerns (current or past history):       Current Concerns: has court hearing next week due to threats against sister  Court on July 1  Case Location: [facility]  Case Status: Under Court Jurisdiction    Past History: None    Legal Status:  72 hour hold       County: [facility]  File Number:   Start and expiration date of commitment:     Commitment History: None       Service History: None    Ethnic/Cultural/Spiritual considerations:   The patient describes their cultural background as Black/,  unknown , male.  Contextual influences on patient's health include unknown.   Patient identified their preferred language to be English. Patient reported they do not need the assistance of an .  Spiritual considerations include: unknown    Social Functioning (organizations, interests, support system):   In their free time, patient reports they like to    Hobbies:  sports/team sports, television/movies/videos.      Patient identified  aunt  as part of their support system.  Patient identified the quality of these relationships as good.       Current Treatment Providers are:  Primary Care Provider:  None    Medication Management/Psychiatry:  None    Therapist:   None      Other contact information (family, friends, SO) and CLIFTON status:   CLIFTON for aunt.Aunt:Nita @ [phone])  Father:Subhash @ [phone]      GOALS FOR HOSPITALIZATION:  What do patient want to accomplish during this hospitalization to make things better for the patient.?   Patient priorities:  The patient reported that what is most important to them is unknown. They identified unknown as a goal of this hospitalization.    Social Service Assessment/Plan:  Patient view:   Patient reports it is important for the care team to  know unknown.  Upon discharge, they anticipate needing unknown set up for them.      Strengths and Assets:  The patient uses these coping skills to help with stress and hard times:   .          Patient will have psychiatric assessment and medication management by the psychiatrist. Medications will be reviewed and adjusted per DO/MD/APRN CNP as indicated. The treatment team will continue to assess and stabilize the patient's mental health symptoms with the use of medications and therapeutic programming. Hospital staff will provide a safe environment and a therapeutic milieu. Staff will continue to assess patient as needed. Patient will participate in unit groups and activities. Patient will receive individual and group support on the unit.      CTC will do individual inpatient treatment planning and after care planning. CTC will discuss options for increasing community supports with the patient. CTC will coordinate with outpatient providers and will place referrals to ensure appropriate follow up care is in place.

## 2024-06-28 NOTE — PROGRESS NOTES
"   Rehab Group     Start time: 1015  End time: 1430  Patient time total: 30 minutes     #9 attended   Group Type: music   Group Topic Covered: Mindfulness, Coping/Stress Relief, Creativity, Social Skills      Group Session Detail:   1) Relaxation  2) electronic music beat making  3) music choice       Patient Response/Contribution: restless, disinhibited      Patient Detail: Pt was laying on the floor in group room during first MT session today.  Was redirected and stated \"oh, I forgot there were people in here!\"    Pt engaged greatly with MT electronic beat making intervention, where pts had the opportunity to hands on \"make beats\" together and individually.    While making beats individually, pt touched the screen buttons at a rapid pace to create his beats.  MT encouraged him to take his time and listen to each beat, but his brain appeared activated and unable to slow fully.     Pt was in and out of am sessions.     He did not participate in afternoon session.      Activity Therapy Per 15 minutes () Other Rehab therapies    Patient Active Problem List   Diagnosis    Anxiety    Adjustment disorder with anxious mood    Polysubstance abuse (H)    Manic behavior (H)    Psychosis (H)    Bipolar affective disorder, current episode manic (H)    Schizoaffective disorder, bipolar type (H)    Suicidal ideation    Agitation    Itzel (H)             "
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
## 2024-06-28 NOTE — PLAN OF CARE
Problem: Psychotic Signs/Symptoms  Goal: Improved Behavioral Control (Psychotic Signs/Symptoms)  Outcome: Progressing    Patient continues on SIO at 10 feet for severe intrusiveness. Patient is restless and presents with loud, rambling speech and flight of ideas. Concentration is poor. Mood is labile. Increasingly demanding with requests as evening has gone on. Patient endorsed anxiety earlier in the shift, but otherwise denies mental health symptoms. Patient requested PRN medications for anxiety and chronic pain before group time. PRN hydroxyzine and robaxin given which appeared to be effective. Patient attended group and was out at dinner time without incident.   Insight into reason for being in the hospital remains limited. After dinner, patient became increasingly agitated that he is not able to go outside or use his vape. Patient has a nicotine patch on and has been offered nicotine gum, but states that it has not been helpful.   Patient was rolling around on the floor and pounding on the windows in the lounge while demanding to go outside.   Patient offered oral haldol, benadryl and ativan at this time for agitation. Patient accepted medication, but remained restless and agitated after an hour. Patient continued to demand his vape. Patient began cursing, attempted to walk into nurses station, and threatened to break window with his head so he could escape and go steal one if staff would not give him his own. He continued to roll around on floor and would pace espinoza intermittently with his shirt off.   LUCIANO team notified and Code 21 called, as patient was difficult to redirect and disruptive to milieu.   With LUCIANO team present, patient agreed to take his scheduled medication for the evening and rest in his room. Patient offered and given snack for the evening before going to bed. Patient appears to be resting comfortably at this time.   Patient was noted to have increase in slurred speech and was sticking tongue  "out while speaking when he was still awake. Reported by staff that he had been doing this on the day shift, but was more pronounced now.   On call provider notified and updated. Will continue to monitor, otherwise no further recommendations at this time.   Patient continues on SIO for safety.     /76   Pulse 86   Temp 97.1  F (36.2  C) (Oral)   Resp 16   Ht 1.88 m (6' 2\")   Wt 75.4 kg (166 lb 3.2 oz)   SpO2 100%   BMI 21.34 kg/m         "

## 2024-06-28 NOTE — PLAN OF CARE
BEH IP Unit Acuity Rating Score (UARS)  Patient is given one point for every criteria they meet.    CRITERIA SCORING   On a 72 hour hold, court hold, committed, stay of commitment, or revocation. 1    Patient LOS on BEH unit exceeds 20 days. 0  LOS: 1   Patient under guardianship, 55+, otherwise medically complex, or under age 11. 0   Suicide ideation without relief of precipitating factors. 1   Current plan for suicide. 0   Current plan for homicide. 0   Imminent risk or actual attempt to seriously harm another without relief of factors precipitating the attempt. 0   Severe dysfunction in daily living (ex: complete neglect for self care, extreme disruption in vegetative function, extreme deterioration in social interactions). 1   Recent (last 7 days) or current physical aggression in the ED or on unit. 0   Restraints or seclusion episode in past 72 hours. 0   Recent (last 7 days) or current verbal aggression, agitation, yelling, etc., while in the ED or unit. 1 1 days since 6/27     Active psychosis. 1   Need for constant or near constant redirection (from leaving, from others, etc).  1   Intrusive or disruptive behaviors. 1   Patient requires 3 or more hours of individualized nursing care per 8-hour shift (i.e. for ADLs, meds, therapeutic interventions). 0   TOTAL 7

## 2024-06-28 NOTE — PLAN OF CARE
06/27/24 2058   Patient Belongings   Did you bring any home meds/supplements to the hospital?  No   Patient Belongings locker   Patient Belongings Put in Hospital Secure Location (Security or Locker, etc.) cell phone/electronics;clothing;shoes;other (see comments)   Belongings Search Yes   Clothing Search Yes   Second Staff S.W.   Comment No cash, ID, Credit card or medications     Goal Outcome Evaluation:  2 pair plaid pants, 2 T shirts, 2 e-cigs, Black and white nike shoes, 2 blankets, Cell phone, black backpack  A               Admission:  I am responsible for any personal items that are not sent to the safe or pharmacy.  [facility] is not responsible for loss, theft or damage of any property in my possession.    Signature:  _________________________________ Date: _______  Time: _____                                              Staff Signature:  ____________________________ Date: ________  Time: _____      2nd Staff person, if patient is unable/unwilling to sign:    Signature: ________________________________ Date: ________  Time: _____     Discharge:  [facility] has returned all of my personal belongings:    Signature: _________________________________ Date: ________  Time: _____                                          Staff Signature:  ____________________________ Date: ________  Time: _____
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
## 2024-06-28 NOTE — PLAN OF CARE
"Lamine \"Serafin\" has been awake and active on the unit.   He appears restless, pacing much of the shift.   He continues on SIO 1:1 monitoring for assault risk/intrusiveness.   He has been intrusive at times, interjecting himself in others conversations. But he has been easily redirectable.   He is social with peers and staff. He presents with a labile mood-irritable to elated and animated. Hyperverbal speech with flight of ideas.   Initially this morning, pt described his mood as \"awesome!\"   He says he is in the hospital because \"my evil aunt made some stuff up.\" He denies any subjective mental health sx or concerns. Denies SI/HI anxiety or depression, or psychotic sx.   He has been loudly talking to himself when alone in his room, possibly responding to internal stimuli. He complimented the hospital, \"the hospitality here is amazing and I have a great view!\"  He refused scheduled Zyprexa, citing \"I dont want to feel like a zombie or be dependent on anything. I am not friends with Zyprexa.\"  1150-Pt c/o chronic left hip pain. He appeared to be escalating behaviorally-lying on the floor in the hallway, attempting to lift chairs in the lounge area. He accepted prn Robaxin for pain and prn Hydroxyzine for anxiety. He attempted to lay down to rest but would not stay in bed.   1220-Pt lobbying for discharge. Pleading for writer to advocate for him to be discharged. He says he was told that the Doctor would end his hold early so he could leave. Writer attempted to explain the process of 72 hour hold and petition for commitment, but pt was not receptive and terminated the conversation. He later reapproached and asked to discuss the possibility of court. He confused his Criminal Court with the possibility of Civil court and indicated that he thought being civilly committed would keep him from being criminally charged. He became elated, dancing and repeating, \"Its a smash hit!\"  1400-Pt in bed, appears to be sleeping " "soundly.     Pt has a great appetite. Eating 100% of his meals plus snacks.     Pt reports chronic left hip pain that he describes as sciatica. No other physical concerns noted.   VS WNL. /76   Pulse 89   Temp 97.5  F (36.4  C) (Oral)   Resp 16   Ht 1.88 m (6' 2\")   Wt 75.4 kg (166 lb 3.2 oz)   SpO2 94%   BMI 21.34 kg/m          PRNs-  Tylenol  Robaxin  Hydroxyzine  Nicotine gum      Problem: Adult Behavioral Health Plan of Care  Goal: Plan of Care Review  Outcome: Progressing  Flowsheets (Taken 6/28/2024 0830)  Patient Agreement with Plan of Care: (contests neuroleptic medication) disagrees (describe)     Problem: Psychotic Signs/Symptoms  Goal: Improved Behavioral Control (Psychotic Signs/Symptoms)  Outcome: Progressing     Problem: Suicide Risk  Goal: Absence of Self-Harm  Outcome: Progressing  Intervention: Assess Risk to Self and Maintain Safety  Recent Flowsheet Documentation  Taken 6/28/2024 0830 by Brenda De La Torre, RN  Behavior Management: boundaries reinforced   Goal Outcome Evaluation:    Plan of Care Reviewed With: patient                   "

## 2024-06-29 LAB
CHOLEST SERPL-MCNC: 209 MG/DL
HBA1C MFR BLD: 5.4 %
HDLC SERPL-MCNC: 70 MG/DL
LDLC SERPL CALC-MCNC: 83 MG/DL
NONHDLC SERPL-MCNC: 139 MG/DL
TRIGL SERPL-MCNC: 281 MG/DL

## 2024-06-29 PROCEDURE — 124N000002 HC R&B MH UMMC

## 2024-06-29 PROCEDURE — 250N000013 HC RX MED GY IP 250 OP 250 PS 637: Performed by: EMERGENCY MEDICINE

## 2024-06-29 PROCEDURE — 82465 ASSAY BLD/SERUM CHOLESTEROL: CPT | Performed by: PSYCHIATRY & NEUROLOGY

## 2024-06-29 PROCEDURE — 250N000013 HC RX MED GY IP 250 OP 250 PS 637: Performed by: CLINICAL NURSE SPECIALIST

## 2024-06-29 PROCEDURE — 250N000013 HC RX MED GY IP 250 OP 250 PS 637: Performed by: FAMILY MEDICINE

## 2024-06-29 PROCEDURE — 83036 HEMOGLOBIN GLYCOSYLATED A1C: CPT | Performed by: PSYCHIATRY & NEUROLOGY

## 2024-06-29 PROCEDURE — 250N000013 HC RX MED GY IP 250 OP 250 PS 637: Performed by: PSYCHIATRY & NEUROLOGY

## 2024-06-29 PROCEDURE — 36415 COLL VENOUS BLD VENIPUNCTURE: CPT | Performed by: PSYCHIATRY & NEUROLOGY

## 2024-06-29 RX ORDER — POLYETHYLENE GLYCOL 3350 17 G
4 POWDER IN PACKET (EA) ORAL
Status: DISCONTINUED | OUTPATIENT
Start: 2024-06-29 | End: 2024-08-09 | Stop reason: HOSPADM

## 2024-06-29 RX ADMIN — OLANZAPINE 10 MG: 10 TABLET, ORALLY DISINTEGRATING ORAL at 21:05

## 2024-06-29 RX ADMIN — HYDROXYZINE HYDROCHLORIDE 50 MG: 50 TABLET, FILM COATED ORAL at 11:27

## 2024-06-29 RX ADMIN — GABAPENTIN 300 MG: 300 CAPSULE ORAL at 19:18

## 2024-06-29 RX ADMIN — LORAZEPAM 2 MG: 2 TABLET ORAL at 19:18

## 2024-06-29 RX ADMIN — NICOTINE POLACRILEX 4 MG: 4 GUM, CHEWING BUCCAL at 20:06

## 2024-06-29 RX ADMIN — GABAPENTIN 300 MG: 300 CAPSULE ORAL at 07:58

## 2024-06-29 RX ADMIN — Medication 50 MCG: at 21:05

## 2024-06-29 RX ADMIN — NICOTINE 1 PATCH: 21 PATCH, EXTENDED RELEASE TRANSDERMAL at 08:00

## 2024-06-29 RX ADMIN — DIPHENHYDRAMINE HYDROCHLORIDE 50 MG: 50 CAPSULE ORAL at 19:18

## 2024-06-29 RX ADMIN — NICOTINE POLACRILEX 2 MG: 2 GUM, CHEWING BUCCAL at 07:59

## 2024-06-29 RX ADMIN — ACETAMINOPHEN 650 MG: 325 TABLET, FILM COATED ORAL at 20:07

## 2024-06-29 RX ADMIN — HALOPERIDOL 5 MG: 5 TABLET ORAL at 19:18

## 2024-06-29 RX ADMIN — ACETAMINOPHEN 650 MG: 325 TABLET, FILM COATED ORAL at 11:42

## 2024-06-29 RX ADMIN — HYDROXYZINE HYDROCHLORIDE 50 MG: 50 TABLET, FILM COATED ORAL at 16:55

## 2024-06-29 RX ADMIN — GABAPENTIN 300 MG: 300 CAPSULE ORAL at 13:06

## 2024-06-29 RX ADMIN — OLANZAPINE 10 MG: 10 TABLET, FILM COATED ORAL at 12:11

## 2024-06-29 RX ADMIN — NICOTINE POLACRILEX 4 MG: 4 GUM, CHEWING BUCCAL at 11:42

## 2024-06-29 ASSESSMENT — ACTIVITIES OF DAILY LIVING (ADL)
ADLS_ACUITY_SCORE: 28
ADLS_ACUITY_SCORE: 28
DRESS: INDEPENDENT
ADLS_ACUITY_SCORE: 28
HYGIENE/GROOMING: INDEPENDENT
DRESS: INDEPENDENT
ORAL_HYGIENE: INDEPENDENT
HYGIENE/GROOMING: INDEPENDENT
ADLS_ACUITY_SCORE: 28
ORAL_HYGIENE: INDEPENDENT
LAUNDRY: UNABLE TO COMPLETE
ADLS_ACUITY_SCORE: 28
LAUNDRY: UNABLE TO COMPLETE
ADLS_ACUITY_SCORE: 28

## 2024-06-29 NOTE — PROGRESS NOTES
Combination PRN Medication Administration    Medications Administered: Benadryl+Haldol+Ativan combination    What patient symptom(s) led to the administration of these medications?    Patient is agitated, impulsive and disruptive in milieu. Rolling on the ground in the hallway and pounding on windows demanding to be let outside.    What interventions were attempted to avoid use of these medication (including other PRN medications)?     Patient is on SIO at 10 feet, but difficult to redirect. Other medication offered and refused.    What were the patient's response(s) to the interventions?     Patient refused PRN zyprexa. Patient continues to be restless and difficult to redirect.    Provider notified: Dr. Lennon       Date: 06/28/24        Time: 19:00    Danelle Sellers RN

## 2024-06-29 NOTE — PLAN OF CARE
"Goal Outcome Evaluation:    Plan of Care Reviewed With: patient      Problem: Psychotic Signs/Symptoms  Goal: Improved Behavioral Control (Psychotic Signs/Symptoms)  Outcome: Progressing  Intervention: Manage Behavior  Recent Flowsheet Documentation  Taken 6/29/2024 0900 by Fabiana Ashby RN  De-Escalation Techniques: appropriate behavior reinforced    Patient noted to be restless, unable to stay still. Patient talking almost argumentative  most of the time. Affect was bright. Asked for his morning medications. Declined to take Olanzapine stating that it is for bipolar \"and I don't have bipolar. During assessment, patient claimed to be possessed by beast. \"I am possessed by the beast. The beast does not allow me to get anxious or depressed.\" Patient stated that he does not see the beast neither does the beast talk to him. Patient stated that he is the only one who can kill the beast. Patient denied thought of suicide and homicide.   Patient highly disorganized. Frequently seeking out staff and frequently demanding for medication. Requested for a combination of medication at 1120 stating that he wanted to go to sleep. RN suggested Hydroxyzine. Patient declined but after much talk with him, he accepted PRN Hydroxyzine at 1127. At 1140, patient sat on the floor in front of nurses' desk complaining of generalized body pain. He got up and told RN that he cheeked his Gabapentin this morning and went to his bathroom and snorted it. Patient stated that after that he threw up in his bathroom. Patient actually had emesis which was reported by housekeeping. PRN Tylenol was administered at 1142. Patient came back few minutes later and requested to be given Olanzapine. Same administered at 1211. Patient continues to be on 1:1 SIO for safety.  Despite frequent redirection, patient's behavior continues to be restless and disruptive on the unit. One moment he is sitting on the floor, the next moment he is sitting on the " "nurses' desk with his legs stretched out. Several attempt to make him go to his room failed. Patient stated, \"no, am not going anywhere. I have to do it my way.\" Patient appeared not redirectable. LUCIANO called. Team arrived at 1330. At this time patient appeared to be calming down. No further intervention from LUCIANO as patient was becoming less disruptive. He paced around for some time and later went back to his room at 1345 and laid down. Patient slept for an hour.               "

## 2024-06-29 NOTE — PLAN OF CARE
"Problem: Psychotic Signs/Symptoms  Goal: Improved Behavioral Control (Psychotic Signs/Symptoms)  Outcome: Not Progressing    Patient continues on SIO at 10 feet for severe intrusiveness. Patient continues to present with disorganized, delusional thought process and labile mood. Appears restless and has been agitated at times, but redirectable. Speech can be loud, rambling and pressured. Patient noted to gesture and talk to himself at times as if responding to internal stimuli, but denies AH/VH and all other mental health symptoms. Insight is limited. Patient has made demands to leave intermittently and also makes calls to family demanding to pick him up. Patient states the only reason he is taking medication is to help him sleep through the weekend. Patient followed up by stating he is not sure if he can make it through the weekend with out acting out and requested medication to help him relax again. When offered zyprexa again, patient became irritable, and declined stating \"It doesn't help because it's for bipolar. I don't have that.\" Patient complained of being bored, but declined activities when offered stating \"I don't want to have fun here either.\"  Patient offered PRN hydroxyzine instead for anxiety, which he accepted, and appeared to be somewhat effective for a short time.   Patient was observed pacing in espinoza and watching television while listening to headphones. He was out for meals and snacks. Appetite is good.   Patient continues to make agitated calls to family intermittently. Slams phone down to hang up, and complains that no one is picking up. Patient redirected and prompted to shower to distract himself.   Patient reported to make statements that he can hear his ancestors telling him that he needs to leave tonight or there will be death. When out of the shower, patient making statements that he is suffocating in here, and laid himself out on the floor in the middle of the hallway. Patient stood up " "when redirected but continues to be agitated, posturing somewhat at staff and yelling at them to stop following him.   Patient offered oral haldol, ativan and benadryl at this time for agitation and accepted. He also took scheduled dose of gabapentin and PRN tylenol x 1 for right leg pain.  Patient is somewhat calmer, but continues to be restless and disorganized. Redirected multiple times for running in the hallway and coming out of room with just a blanket around him instead of wearing scrubs.   Patient continues to be irritated that he is not able to use his vape in the hospital. States nicotine patch  is not helping and that he doesn't like the gum. Patient offered nicotine lozenge instead. Patient stated he would try it, and order was changed.  He continues to be inappropriately focused on discharge. Rambling statements made about needing to get out so he can save the world. Patient also making comments about not being able to get a permit to carry if he is here and continues to take antipsychotic medications.   Patient encouraged to cooperate with treatment and to focus on stabilizing symptoms before he can discharge. Patient continues to be reluctant, but agreed to take his scheduled dose of zyprexa before going to bed. Nicotine lozenge offered and declined, stating that he was ready to sleep. Patient went back to room and slept about an hour and a half. He was up x 1 to get a snack and returned to room.       /78 (BP Location: Left arm)   Pulse 78   Temp 97.3  F (36.3  C) (Temporal)   Resp 16   Ht 1.88 m (6' 2\")   Wt 75.4 kg (166 lb 3.2 oz)   SpO2 100%   BMI 21.34 kg/m      "

## 2024-06-29 NOTE — PLAN OF CARE
Problem: Depressive Signs/Symptoms  Goal: Improved Sleep (Depressive Signs/Symptoms)  Intervention: Promote Healthy Sleep Hygiene  Recent Flowsheet Documentation  Taken 6/29/2024 0600 by Damián Nova RN  Sleep Hygiene Promotion: noise level reduced   Goal Outcome Evaluation:    The patient appeared to sleep most of the night. He came out once for snacks and returned to bed. His SIO was maintained, and no concerns regarding his behavior or safety were noted. He slept for about 6 hours.

## 2024-06-30 PROCEDURE — 250N000013 HC RX MED GY IP 250 OP 250 PS 637: Performed by: CLINICAL NURSE SPECIALIST

## 2024-06-30 PROCEDURE — 250N000013 HC RX MED GY IP 250 OP 250 PS 637: Performed by: EMERGENCY MEDICINE

## 2024-06-30 PROCEDURE — 250N000013 HC RX MED GY IP 250 OP 250 PS 637: Performed by: PSYCHIATRY & NEUROLOGY

## 2024-06-30 PROCEDURE — 124N000002 HC R&B MH UMMC

## 2024-06-30 PROCEDURE — 250N000013 HC RX MED GY IP 250 OP 250 PS 637: Performed by: FAMILY MEDICINE

## 2024-06-30 RX ADMIN — NICOTINE 1 PATCH: 21 PATCH, EXTENDED RELEASE TRANSDERMAL at 08:09

## 2024-06-30 RX ADMIN — GABAPENTIN 300 MG: 300 CAPSULE ORAL at 19:38

## 2024-06-30 RX ADMIN — ACETAMINOPHEN 650 MG: 325 TABLET, FILM COATED ORAL at 13:18

## 2024-06-30 RX ADMIN — Medication 50 MCG: at 22:03

## 2024-06-30 RX ADMIN — OLANZAPINE 5 MG: 5 TABLET, ORALLY DISINTEGRATING ORAL at 08:10

## 2024-06-30 RX ADMIN — OLANZAPINE 10 MG: 10 TABLET, ORALLY DISINTEGRATING ORAL at 22:05

## 2024-06-30 RX ADMIN — OLANZAPINE 10 MG: 10 TABLET, FILM COATED ORAL at 17:10

## 2024-06-30 RX ADMIN — GABAPENTIN 300 MG: 300 CAPSULE ORAL at 13:19

## 2024-06-30 RX ADMIN — HYDROXYZINE HYDROCHLORIDE 50 MG: 50 TABLET, FILM COATED ORAL at 22:03

## 2024-06-30 RX ADMIN — NICOTINE POLACRILEX 4 MG: 2 LOZENGE ORAL at 19:44

## 2024-06-30 RX ADMIN — METHOCARBAMOL 500 MG: 500 TABLET ORAL at 19:38

## 2024-06-30 RX ADMIN — ACETAMINOPHEN 650 MG: 325 TABLET, FILM COATED ORAL at 22:02

## 2024-06-30 RX ADMIN — GABAPENTIN 300 MG: 300 CAPSULE ORAL at 08:09

## 2024-06-30 RX ADMIN — ACETAMINOPHEN 650 MG: 325 TABLET, FILM COATED ORAL at 17:10

## 2024-06-30 RX ADMIN — NICOTINE POLACRILEX 4 MG: 2 LOZENGE ORAL at 14:16

## 2024-06-30 RX ADMIN — METHOCARBAMOL 500 MG: 500 TABLET ORAL at 15:21

## 2024-06-30 RX ADMIN — HYDROXYZINE HYDROCHLORIDE 25 MG: 25 TABLET ORAL at 13:19

## 2024-06-30 ASSESSMENT — ACTIVITIES OF DAILY LIVING (ADL)
ADLS_ACUITY_SCORE: 28
DRESS: INDEPENDENT
ADLS_ACUITY_SCORE: 28
HYGIENE/GROOMING: INDEPENDENT
ADLS_ACUITY_SCORE: 28
HYGIENE/GROOMING: INDEPENDENT
ADLS_ACUITY_SCORE: 28
ORAL_HYGIENE: INDEPENDENT
ADLS_ACUITY_SCORE: 28
ADLS_ACUITY_SCORE: 28
LAUNDRY: WITH SUPERVISION
ADLS_ACUITY_SCORE: 28

## 2024-06-30 NOTE — PROGRESS NOTES
Combination PRN Medication Administration    Medications Administered: Benadryl+Haldol+Ativan combination    What patient symptom(s) led to the administration of these medications?    Patient is agitated, delusional and disorganized.     What interventions were attempted to avoid use of these medication (including other PRN medications)?     PRN medication for anxiety, redirection, activities offered for distraction.     What were the patient's response(s) to the interventions?     Patient continues to make agitated calls to family intermittently. Slams phone down to hang up, and complains that no one is picking up. Patient redirected and prompted to shower to distract himself.   Patient reported to make statements that he can hear his ancestors telling him that he needs to leave tonight or there will be death. When out of the shower, patient making statements that he is suffocating in here, and laid himself out on the floor in the middle of the hallway. Patient stood up when redirected but continues to be agitated, posturing somewhat at staff and yelling at them to stop following him.   Patient offered oral haldol, ativan and benadryl at this time for agitation and accepted.     Provider notified: Patrick Reyes       Date: 06/29/24        Time: 19:39    Danelle Sellers RN

## 2024-06-30 NOTE — PLAN OF CARE
Problem: Psychotic Signs/Symptoms  Goal: Improved Behavioral Control (Psychotic Signs/Symptoms)  Outcome: Progressing    Patient continues on SIO at 10 feet for severe intrusiveness. He is disorganized and restless. Speech is rambling and pressured at times, but not as loud this evening. He has been perseverating and making demands to call doctor [facility] Explanation given multiple time that he cannot leave hospital while on a 72 hour hold, but insight remains limited.   Patient observed pacing or jogging in the hallway earlier in the shift. He also continues to sit or lie down in the espinoza on occasion, but has been more easily redirected.   Patient accepted PRN zyprexa for agitation at 17:10. PRN tylenol also given for left lower extremity pain. Patient encouraged to take a rest from pacing in the espinoza, agreed, and went back to his room. Patient came back out to eat dinner. After eating he requested to take all of his medications early so he could go to bed. When patient informed that it was too early to give zyprexa again, he agreed to wait and went back to room where he slept for about an hour.   When patient woke up he was offered his medication, but declined, verbalizing that he no longer felt the need for them after getting some sleep. He ended up requesting PRN robaxin at 19:30 and also took scheduled dose of gabapentin at that time, but continued to decline scheduled dose of zyprexa and vitamin D.   Patient stayed out to eat snack and watched television for a while before going back to room to rest for about another hour.   Patient back out of room around 21:30, walking and listening to headphones, stopping intermittently to watch television. Appears internally preoccupied. He approached desk at 22:00 and requested the rest of his medications for the evening. Affect is bright. Patient given vitamin D, PRN hydroxyzine and PRN tylenol. He continued to decline zyprexa  but was back at desk with in 10 minutes requesting to take it so he could go to bed.

## 2024-06-30 NOTE — PLAN OF CARE
"Problem: Adult Behavioral Health Plan of Care  Goal: Adheres to Safety Considerations for Self and Others  Outcome: Progressing  Sleeping in bed at the beginning of the shift. He approached desk shortly after midnight asking for nicotine patch or lozenge. When told that lozenge is not available at this time, he asked for his vape. When pt was redirected by SIO, he states, \"come suck my key\". Pt redirected and he went back to his room and fell asleep. He woke up at 0530 and approached the desk asking for his vape and snacks. Pt given snacks.   Pt slept for approximately  5.25 hours.  No safety concerns noted overnight  Pt continues on SIO for assault risk and severe intrusiveness.      "

## 2024-06-30 NOTE — PLAN OF CARE
"  Problem: Depressive Signs/Symptoms  Goal: Optimized Nutrition Intake (Depressive Signs/Symptoms)  Outcome: Progressing     Problem: Suicide Risk  Goal: Absence of Self-Harm  Outcome: Progressing   Goal Outcome Evaluation:     Hyperactive, disorganized, restless. Patient believes he is discharging tomorrow. Patient has made several requests to be \"knocked out until tomorrow\". Patient endorsed 6.58/10 pain on the left side of his body and requested to take tylenol for this. Patient also endorsed anxiety and requested PRN hydroxyzine. Tylenol and hydroxyzine were given. Patient requested Ativan and Benadryl but writer did not administer them due to them not being indicated at this time. Patient has not had any unsafe behaviors this shift. Patient requested to to leave the hospital in order to attend a L[employer]-day service. Patient was informed he could not do that but that he could attend a service online. Patient asked for a recommendation for a Latter-day, and writer suggested speaking to a beth. Patient also spoke about how in his culture/Latter day people believe humans can become possessed by demons, he said people in [place] may call it Bipolar or schizophrenia, but in his culture it means they are possessed by demons and it is treated by reading the Quran. He reports he has seen people become exorcised. Patient states he wants guidance from the  on how to practice his Latter day while inpatient. At around 1415 patient came up to writer and stated \"I need my inhaler, my vape is my inhaler\" Writer explained that he can't have his vape here but he can have nicotine lozenges. Patient initially declined the nicotine lozenges stated he already tried them and they don't work. Writer asked patient if he was having anxiety at that time and he stated \"No!, I don't have anxiety, absolutely no anxiety\". Patient stated the hydroxyzine writer gave him earlier was effective. While writer was obtaining the nicotine lozenge " "patient was lying on the floor in the hallway shouted \"I need a cigarette, I need a cigarette!!!\". Writer gave patient 4mg of nicotine lozenge. A few minutes later patient stopped shouting. Writer asked if he could tell there was nicotine in the lozenges and if it helped, patient nodded his head and gave writer a thumbs up and got up from the floor.     Patient denies SI, HI, AVH.                   "

## 2024-07-01 PROCEDURE — 250N000013 HC RX MED GY IP 250 OP 250 PS 637: Performed by: PSYCHIATRY & NEUROLOGY

## 2024-07-01 PROCEDURE — 250N000013 HC RX MED GY IP 250 OP 250 PS 637: Performed by: EMERGENCY MEDICINE

## 2024-07-01 PROCEDURE — 99232 SBSQ HOSP IP/OBS MODERATE 35: CPT | Performed by: PSYCHIATRY & NEUROLOGY

## 2024-07-01 PROCEDURE — 124N000002 HC R&B MH UMMC

## 2024-07-01 PROCEDURE — 250N000013 HC RX MED GY IP 250 OP 250 PS 637: Performed by: CLINICAL NURSE SPECIALIST

## 2024-07-01 RX ADMIN — NICOTINE POLACRILEX 4 MG: 2 LOZENGE ORAL at 16:38

## 2024-07-01 RX ADMIN — NICOTINE 1 PATCH: 21 PATCH, EXTENDED RELEASE TRANSDERMAL at 09:09

## 2024-07-01 RX ADMIN — OLANZAPINE 10 MG: 10 TABLET, FILM COATED ORAL at 10:40

## 2024-07-01 RX ADMIN — GABAPENTIN 300 MG: 300 CAPSULE ORAL at 14:57

## 2024-07-01 RX ADMIN — METHOCARBAMOL 500 MG: 500 TABLET ORAL at 09:04

## 2024-07-01 RX ADMIN — METHOCARBAMOL 500 MG: 500 TABLET ORAL at 01:55

## 2024-07-01 RX ADMIN — DIPHENHYDRAMINE HYDROCHLORIDE 50 MG: 50 CAPSULE ORAL at 16:54

## 2024-07-01 RX ADMIN — HYDROXYZINE HYDROCHLORIDE 50 MG: 50 TABLET, FILM COATED ORAL at 15:01

## 2024-07-01 RX ADMIN — GABAPENTIN 300 MG: 300 CAPSULE ORAL at 21:01

## 2024-07-01 RX ADMIN — LORAZEPAM 2 MG: 2 TABLET ORAL at 16:54

## 2024-07-01 RX ADMIN — METHOCARBAMOL 500 MG: 500 TABLET ORAL at 14:57

## 2024-07-01 RX ADMIN — METHOCARBAMOL 500 MG: 500 TABLET ORAL at 21:01

## 2024-07-01 RX ADMIN — GABAPENTIN 300 MG: 300 CAPSULE ORAL at 09:04

## 2024-07-01 RX ADMIN — HALOPERIDOL 5 MG: 5 TABLET ORAL at 16:55

## 2024-07-01 RX ADMIN — HYDROXYZINE HYDROCHLORIDE 50 MG: 50 TABLET, FILM COATED ORAL at 09:04

## 2024-07-01 ASSESSMENT — ACTIVITIES OF DAILY LIVING (ADL)
HYGIENE/GROOMING: INDEPENDENT
ADLS_ACUITY_SCORE: 28
DRESS: INDEPENDENT
ADLS_ACUITY_SCORE: 28
HYGIENE/GROOMING: INDEPENDENT
LAUNDRY: WITH SUPERVISION
ADLS_ACUITY_SCORE: 28
ORAL_HYGIENE: INDEPENDENT

## 2024-07-01 NOTE — PLAN OF CARE
Pt requested and received  mg. Robaxin for muscle spasms @ 0155. Pt appeared to sleep for a total of 5 hours overnight. No PRN medications were administered, and no concerns were noted.     Problem: Sleep Disturbance  Goal: Adequate Sleep/Rest  Outcome: Progressing

## 2024-07-01 NOTE — PROGRESS NOTES
"[facility], [place]   Psychiatric Progress Note        Interim History:   The patient's care was discussed with the treatment team during the daily team meeting and/or staff's chart notes were reviewed.  Staff report patient had pretty tough weekend. He was continued on SIO and No roommate because of disorganized and often times agitated behavior, was reported to take scheduled Zyprexa off and on. Was frequently loud and needed redirections, focused on discharge from this hospital and today am from the very morning was insisting on meeting MD ASAP. Per staff over weekend patient was screened by the [facility]. He is still on 72 hour hold.    \"Hyperactive, disorganized, restless. Patient believes he is discharging tomorrow. Patient has made several requests to be \"knocked out until tomorrow\". Patient endorsed 6.58/10 pain on the left side of his body and requested to take tylenol for this. Patient also endorsed anxiety and requested PRN hydroxyzine. Tylenol and hydroxyzine were given. Patient requested Ativan and Benadryl but writer did not administer them due to them not being indicated at this time. Patient has not had any unsafe behaviors this shift. Patient requested to to leave the hospital in order to attend a Latter day service. Patient was informed he could not do that but that he could attend a service online. Patient asked for a recommendation for a Latter day, and writer suggested speaking to a beth. Patient also spoke about how in his culture/Taoism people believe humans can become possessed by demons, he said people in [place] may call it Bipolar or schizophrenia, but in his culture it means they are possessed by demons and it is treated by reading the Quran. He reports he has seen people become exorcised. Patient states he wants guidance from the  on how to practice his Taoism while inpatient. At around 1415 patient came up to writer and stated \"I need my inhaler, my vape " "is my inhaler\" Writer explained that he can't have his vape here but he can have nicotine lozenges. Patient initially declined the nicotine lozenges stated he already tried them and they don't work. Writer asked patient if he was having anxiety at that time and he stated \"No!, I don't have anxiety, absolutely no anxiety\". Patient stated the hydroxyzine writer gave him earlier was effective. While writer was obtaining the nicotine lozenge patient was lying on the floor in the hallway shouted \"I need a cigarette, I need a cigarette!!!\". Writer gave patient 4mg of nicotine lozenge. A few minutes later patient stopped shouting. Writer asked if he could tell there was nicotine in the lozenges and if it helped, patient nodded his head and gave writer a thumbs up and got up from the floor.      Patient denies SI, HI, AVH.\"    Met with patient: patient was seen in the conference room in presence of SIO staff, then, CTC joined us. Serafin immediately started talking about being discharged from this hospital and didn't want to listen to any explanation why he could not be discharged right away. He insisted that this provider held whole power over his release, insisted that his aunt was waiting outside of this hospital to come and pick him up. All attempts to explain that we wanted him to stay to get better and, eventually, telling him that he was too manic to leave, were unsuccessful. Serafin continued to insist on leaving, didn't want to listen to our words that county would decide if he leaves or stays and, then, swore, stood up and left. He then, sat on the floor next to the conference room and was swearing at this provider and needed redirections to move away.         Medications:     Current Facility-Administered Medications   Medication Dose Route Frequency Provider Last Rate Last Admin    gabapentin (NEURONTIN) capsule 300 mg  300 mg Oral TID Jese Arizmendi MD   300 mg at 07/01/24 0904    nicotine (NICODERM CQ) 21 " "MG/24HR 24 hr patch 1 patch  1 patch Transdermal Daily Jese Arizmendi MD   1 patch at 07/01/24 0909    OLANZapine zydis (zyPREXA) ODT tab 10 mg  10 mg Oral At Bedtime Sanford Boykin MD   10 mg at 06/30/24 2205    OLANZapine zydis (zyPREXA) ODT tab 5 mg  5 mg Oral Jose Cummings MD   5 mg at 06/30/24 0810    Vitamin D3 (CHOLECALCIFEROL) tablet 50 mcg  50 mcg Oral At Bedtime Maria Pennington APRN CNP   50 mcg at 06/30/24 2203          Allergies:     Allergies   Allergen Reactions    Paliperidone Other (See Comments)     Other reaction(s): Extrapyramidal Symptoms   Significant EPS    Significant EPS    Pork Allergy Other (See Comments)     Rastafarian prohibition          Labs:   No results found for this or any previous visit (from the past 24 hour(s)).       Psychiatric Examination:     /76 (BP Location: Right arm)   Pulse 100   Temp 97.9  F (36.6  C) (Oral)   Resp 16   Ht 1.88 m (6' 2\")   Wt 75.4 kg (166 lb 3.2 oz)   SpO2 98%   BMI 21.34 kg/m    Weight is 166 lbs 3.2 oz  Body mass index is 21.34 kg/m .    Orthostatic Vitals         Most Recent      Sitting Orthostatic /79 07/01 0850    Sitting Orthostatic Pulse (bpm) 100 07/01 0850    Standing Orthostatic /76 07/01 0850    Standing Orthostatic Pulse (bpm) 112 07/01 0850          Appearance: dressed in hospital scrubs  Attitude:  guarded and uncooperative  Eye Contact:  worse  Mood:  angry and anxious  Affect:  intensity is exaggerated and intensity is dramatic  Speech:  pressured speech  Psychomotor Behavior:  no evidence of tardive dyskinesia, dystonia, or tics and physical agitation  Throught Process:  disorganized and illogical  Associations:  loosening of associations present  Thought Content:   delusions are likely  Insight:  limited  Judgement:  poor  Oriented to:  time, person, and place  Attention Span and Concentration:  poor  Recent and Remote Memory:  poor    Clinical Global " Impressions    First: 7/4 7/1/2024      Most recent:            Precautions:     Behavioral Orders   Procedures    Assault precautions    Code 1 - Restrict to Unit    Elopement precautions    Fall precautions    Routine Programming     As clinically indicated    Status 15     Every 15 minutes.    Status Individual Observation     Patient SIO status reviewed with team/RN.  Please also refer to RN/team documentation for add'l detail.    -SIO staff to monitor following which have contributed to patient being on SIO:  Pt was observed pushing on security staff in the ER with intent of leaving the hospital.  -Possible interventions SIO staff could use to support patient's treatment progress:  Offer redirections   -When following observed, team will review discontinuation of SIO:  Able to sign in voluntary and contract for safety     Order Specific Question:   CONTINUOUS 24 hours / day     Answer:   Other     Order Specific Question:   Specify distance     Answer:   10     Order Specific Question:   Indications for SIO     Answer:   Assault risk     Order Specific Question:   Indications for SIO     Answer:   Severe intrusiveness    Suicide precautions: Suicide Risk: HIGH; Clinical rationale to override score: modification to the care environment     Patients on Suicide Precautions should have a Combination Diet ordered that includes a Diet selection(s) AND a Behavioral Tray selection for Safe Tray - with utensils, or Safe Tray - NO utensils       Order Specific Question:   Suicide Risk     Answer:   HIGH     Order Specific Question:   Clinical rationale to override score:     Answer:   modification to the care environment          DIagnoses:     Bipolar affective disorder, candie vs Schizoaffective disorder, bipolar type, manic.  Historical dx of stimulants use disorder.         Plan:     Per CTC, petition was supported and will discontinue 72 hour hold and replace it with court hold as of 7/1/2024. Will continue to  provide support and structure and encourage taking scheduled Zyprexa.     Total time spent was 37 minutes. Over 50% of times was spent counseling and coordination of care regarding coping skills, medication and discharge planning.

## 2024-07-01 NOTE — PROGRESS NOTES
Combination PRN Medication Administration    Medications Administered: Benadryl+Haldol+Ativan combination    What patient symptom(s) led to the administration of these medications?agitation, running in the hallways, yelling, cursing and banging the doors.      What interventions were attempted to avoid use of these medication (including other PRN medications)?   I offered prn Zyprexa 10mg but patient refused and when another RN offered it, patient threw it on the floor and stepped on it.      What were the patient's response(s) to the interventions?   Increased agitation and swearing.    Provider notified: yes       Date: 07/01/24        Time: 5pm    Bjorn Sutherland RN

## 2024-07-01 NOTE — PROGRESS NOTES
Throughout the evening Pt. had several displays of manic behaviors, such as running up and down the hallway because he could potentially collide with another patient or trip, his response to this behavior was lying on the floor in front of the nursing station. Later in the evening he started laying/crawling on the hallway floor, he eventually followed redirection to stand up. In the lounge among peers had several instances of standing directly in front of the television, and raising his hands in praise in reaction to random broadcast content.    Throughout the evening his pace was rapid, appearing restless, oftentimes walking out of his room, down the hallway to the lounge and would sit down for a few seconds then get up and return to his room. He was observed pacing back and fourth in the dining room. Twice this shift he walked to dining room and proceeded to wash his face in the sink.    He had several clothing changes throughout the evening, including using his bed linens to fabricate a makeshift toga, only wearing underwear and a shirt. At one point he removed his shirt in the lounge and wrapped a towel around this upper torso, this required several que's to replace his shirt. A few times this evening he appeared to be responding to internal stimuli, and sometimes has difficulties tracking conversation.

## 2024-07-01 NOTE — PLAN OF CARE
Team Note Due:  Friday    Assessment/Intervention/Current Symtoms and Care Coordination:  Chart review and met with team, discussed pt progress, symptomology, and response to treatment.  Discussed the discharge plan and any potential impediments to discharge.    Pt remains with candie sympomts, agitation and on 1:1 status.    I have spent much ot the day talking to [facility] and [facility] about who will take thsi case on for civil commitment. [place] screened it and supported it but their  wiould not take the case.  [facility] ended up taking the case. Paperwork had to be redone.  I have submitted all the paperwork.  [facility] has called here to tlak to patient.  They have stated that we need to consider a back to back hold.  MD is placing a new hold.  [facility] is supporting the petition.  I spoke with Mariann Hernandez at [facility] and she says they can not do the hold and to call tomorrow.         Discharge Plan or Goal:  IRTS       Barriers to Discharge:  Pt is ill and does not have providers     Referral Status:  Too early  Pt hasn o established providers       Legal Status:  72 hour hold     They are supporting the petition for commitment.      [facility]: [facility]  File Number:   Start and expiration date of commitment:     Contacts:    CL[place] for aunt.Aunt:Nita @ [phone])  Father:Subhash @ [phone]        Upcoming Meetings and Dates/Important Information and next steps:      Waiting for court hold

## 2024-07-01 NOTE — PLAN OF CARE
BEH IP Unit Acuity Rating Score (UARS)  Patient is given one point for every criteria they meet.    CRITERIA SCORING   On a 72 hour hold, court hold, committed, stay of commitment, or revocation. 1    Patient LOS on BEH unit exceeds 20 days. 0  LOS: 4   Patient under guardianship, 55+, otherwise medically complex, or under age 11. 0   Suicide ideation without relief of precipitating factors. 1   Current plan for suicide. 0   Current plan for homicide. 0   Imminent risk or actual attempt to seriously harm another without relief of factors precipitating the attempt. 0   Severe dysfunction in daily living (ex: complete neglect for self care, extreme disruption in vegetative function, extreme deterioration in social interactions). 1   Recent (last 7 days) or current physical aggression in the ED or on unit. 0   Restraints or seclusion episode in past 72 hours. 0   Recent (last 7 days) or current verbal aggression, agitation, yelling, etc., while in the ED or unit. 1 4 days since 6/27     Active psychosis. 1   Need for constant or near constant redirection (from leaving, from others, etc).  1   Intrusive or disruptive behaviors. 1   Patient requires 3 or more hours of individualized nursing care per 8-hour shift (i.e. for ADLs, meds, therapeutic interventions). 0   TOTAL 7

## 2024-07-01 NOTE — PROGRESS NOTES
Patient was running and yelling in the hallways, started swearing at staff across the nursing station including the writer. RN offered prn zyprexa 10mg which the patient declined stating that he does not have Bipolar as diagnosed. Another RN attempted to offer the medication but patient threw the medication on the floor and stepped on it. [facility] team was called and patient was given prn benadryl 50mg, haldol 5mg and ativan 2mg. Attending provider notified.   Patient's 72 hr hold was to  after 8pm and patient is now on back to back emergency hospitalized hold 72hr hold.

## 2024-07-01 NOTE — PROGRESS NOTES
Writer attempted to given patient prn Zyprexa after refusing from his RN. Patient refused saying that he did not have bipolar. Took the pill and stomped on it. Patient then attempted to try to throw it in the garbage, but returned it to writer after being told it needed to be discarded elsewhere.

## 2024-07-01 NOTE — PLAN OF CARE
"  Problem: Psychotic Signs/Symptoms  Goal: Improved Behavioral Control (Psychotic Signs/Symptoms)  Outcome: Not Progressing   Goal Outcome Evaluation:    Pt immediately in writer's face this morning asking when the doctor would be in. Unable to stop himself from talking about this even though he said he could. Laid down on the floor in front of a pt's room across from the desk and said he would wait for the doctor right there. Eventually after a few minutes he got up. Rapid, pressured and loud voice. Disorganized thought process, jumping from topic to topic. Refused morning scheduled Zyprexa. Moving quickly all over the unit. Requesting to talk to writer over and over. Eventually he grew very loud and angry about not seeing the doctor. Female RN got pt to take Zyprexa 10 mg, po, prn at 1040. After meeting with the doctor he was yelling \"fuck you doctor\" several times and sitting by the door where the doctor was seeing other pt's. Writer asked him to talk to writer in his room to get him out of the milieu. He did some deep breathing with writer and sat quietly. It only lasted about one minute. He told writer if he could get out he would wear an ankle monitor. Pt put on a adriana hold. He handled this, stating \"now it's the county in charge, not the doctor. Pt stayed calm behaviorally though restless and moving around the unit. His SIO tried to explain the court process. But this upset him and threw an apple on the floor. SIO was switched to another person. Pt laid down and has been sleeping since 1330. Pt woke up from his nap at 1450. Immediately came to the desk groggy and asked to call 911. At that time pt given scheduled Gabapentin, Hydroxyzine 50 mg, prn and Robaxin, 500 mg, prn.      "

## 2024-07-01 NOTE — CONSULTS
SPIRITUAL HEALTH SERVICES  SPIRITUAL ASSESSMENT consult Note  [facility] ([facility]) 30[address]     REFERRAL SOURCE: Routine consult    Lamine was sleeping when I checked in with the unit, will check back tomorrow.     PLAN: Will continue to follow to assess per Lamine's request. Spiritual health services remains available for any follow-up or requests. For further visits please place spiritual health consults.    Dorinda Quintero [facility]  Associate   Pager: [phone]

## 2024-07-02 PROCEDURE — 250N000013 HC RX MED GY IP 250 OP 250 PS 637: Performed by: CLINICAL NURSE SPECIALIST

## 2024-07-02 PROCEDURE — 250N000013 HC RX MED GY IP 250 OP 250 PS 637: Performed by: EMERGENCY MEDICINE

## 2024-07-02 PROCEDURE — H2032 ACTIVITY THERAPY, PER 15 MIN: HCPCS | Performed by: MARRIAGE & FAMILY THERAPIST

## 2024-07-02 PROCEDURE — 250N000013 HC RX MED GY IP 250 OP 250 PS 637: Performed by: PSYCHIATRY & NEUROLOGY

## 2024-07-02 PROCEDURE — 124N000002 HC R&B MH UMMC

## 2024-07-02 PROCEDURE — 99232 SBSQ HOSP IP/OBS MODERATE 35: CPT | Performed by: PSYCHIATRY & NEUROLOGY

## 2024-07-02 RX ORDER — OLANZAPINE 10 MG/1
10 TABLET, ORALLY DISINTEGRATING ORAL 2 TIMES DAILY
Status: DISCONTINUED | OUTPATIENT
Start: 2024-07-02 | End: 2024-07-05

## 2024-07-02 RX ADMIN — HYDROXYZINE HYDROCHLORIDE 50 MG: 50 TABLET, FILM COATED ORAL at 02:13

## 2024-07-02 RX ADMIN — OLANZAPINE 10 MG: 10 TABLET, ORALLY DISINTEGRATING ORAL at 20:16

## 2024-07-02 RX ADMIN — Medication 50 MCG: at 21:51

## 2024-07-02 RX ADMIN — OLANZAPINE 5 MG: 5 TABLET, ORALLY DISINTEGRATING ORAL at 08:01

## 2024-07-02 RX ADMIN — NICOTINE POLACRILEX 4 MG: 2 LOZENGE ORAL at 10:04

## 2024-07-02 RX ADMIN — NICOTINE POLACRILEX 4 MG: 2 LOZENGE ORAL at 20:43

## 2024-07-02 RX ADMIN — METHOCARBAMOL 500 MG: 500 TABLET ORAL at 02:13

## 2024-07-02 RX ADMIN — GABAPENTIN 300 MG: 300 CAPSULE ORAL at 08:01

## 2024-07-02 RX ADMIN — NICOTINE 1 PATCH: 21 PATCH, EXTENDED RELEASE TRANSDERMAL at 08:00

## 2024-07-02 RX ADMIN — HYDROXYZINE HYDROCHLORIDE 50 MG: 50 TABLET, FILM COATED ORAL at 16:04

## 2024-07-02 RX ADMIN — NICOTINE POLACRILEX 4 MG: 2 LOZENGE ORAL at 12:18

## 2024-07-02 RX ADMIN — NICOTINE POLACRILEX 4 MG: 2 LOZENGE ORAL at 18:39

## 2024-07-02 RX ADMIN — ACETAMINOPHEN 650 MG: 325 TABLET, FILM COATED ORAL at 12:16

## 2024-07-02 RX ADMIN — GABAPENTIN 300 MG: 300 CAPSULE ORAL at 20:16

## 2024-07-02 RX ADMIN — GABAPENTIN 300 MG: 300 CAPSULE ORAL at 14:03

## 2024-07-02 RX ADMIN — NICOTINE POLACRILEX 4 MG: 2 LOZENGE ORAL at 14:06

## 2024-07-02 RX ADMIN — METHOCARBAMOL 500 MG: 500 TABLET ORAL at 10:05

## 2024-07-02 RX ADMIN — NICOTINE POLACRILEX 4 MG: 2 LOZENGE ORAL at 16:25

## 2024-07-02 ASSESSMENT — ACTIVITIES OF DAILY LIVING (ADL)
ADLS_ACUITY_SCORE: 28
HYGIENE/GROOMING: INDEPENDENT;SHOWER
ADLS_ACUITY_SCORE: 28
HYGIENE/GROOMING: INDEPENDENT
ADLS_ACUITY_SCORE: 28

## 2024-07-02 NOTE — PLAN OF CARE
BEH IP Unit Acuity Rating Score (UARS)  Patient is given one point for every criteria they meet.    CRITERIA SCORING   On a 72 hour hold, court hold, committed, stay of commitment, or revocation. 1    Patient LOS on BEH unit exceeds 20 days. 0  LOS: 5   Patient under guardianship, 55+, otherwise medically complex, or under age 11. 0   Suicide ideation without relief of precipitating factors. 1   Current plan for suicide. 0   Current plan for homicide. 0   Imminent risk or actual attempt to seriously harm another without relief of factors precipitating the attempt. 0   Severe dysfunction in daily living (ex: complete neglect for self care, extreme disruption in vegetative function, extreme deterioration in social interactions). 1   Recent (last 7 days) or current physical aggression in the ED or on unit. 0   Restraints or seclusion episode in past 72 hours. 0   Recent (last 7 days) or current verbal aggression, agitation, yelling, etc., while in the ED or unit. 1 5 days since 6/27     Active psychosis. 1   Need for constant or near constant redirection (from leaving, from others, etc).  1   Intrusive or disruptive behaviors. 1   Patient requires 3 or more hours of individualized nursing care per 8-hour shift (i.e. for ADLs, meds, therapeutic interventions). 0   TOTAL 7

## 2024-07-02 NOTE — CONSULTS
"SPIRITUAL HEALTH SERVICES  SPIRITUAL ASSESSMENT consult Note  [facility] ([facility]) 30[facility]     REFERRAL SOURCE: routine consult    Met with HARLEY in an interview room with his nurse.    He had pressured speech as he told me about how he has a \"a demon possessing me\" his evidence that he was possessed included \"I get dehydrated sometimes. The pain in my leg that switches from one to another\" and that \"sometimes I feel like someone is grabbing my balls real tight.\" HARLEY explained that the demon \"has lived inside me for a long time, but it only woke up two weeks ago\". He explained that he's \"not good with time, I don't like to live in the past.\"     HARLEY explained that it \"was scary, but I'm very brave\" and that he thinks the best way to \"kill\" the demon is to \"go through horrible physical things. Like  boot camp\" he did emphasize that \"I don't want an exorcist, I saw one when I was a kid.\"     We discussed how maybe medication and his doctors could help weaken the demon, he explained that \"medications make me weaker, not the demon, I already tried that.\"     He gave me the address of \"a Religion, I went there a little, but I'm not Gnosticism, I'm spiritual. I believe in spirits.\" I checked the address and there is no Religion there, or anywhere near there.     He does report sleeping well by listening to the Quran. I recommend offering to play that on an ipad or something - he believes that \"the demons sleep when the Quran is playing\".     After assessment, I'm not sure if further meeting with spiritual health will worsen the Gnosticism fixation or not. He wants us to agree that he doesn't need anything, which I cannot do.     PLAN: Spoke with his nurse after the meeting, I recommend that this is Gnosticism fixation and that I will look into anything that may be culturally soothing to him, but that this is largely mental health symptoms presenting in a Gnosticism way. I recommend playing the Quran for him - similar to playing [employer] " service, if that is something he would accept. Spiritual health services remains available for any follow-up or requests. For further visits please place spiritual health consults.    Dorinda Quintero, [facility]  Associate   Pager: [phone]
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
## 2024-07-02 NOTE — PLAN OF CARE
Problem: Adult Behavioral Health Plan of Care  Goal: Adheres to Safety Considerations for Self and Others  Outcome: Progressing     Problem: Adult Behavioral Health Plan of Care  Goal: Develops/Participates in Therapeutic [place] to Support Successful Transition  Outcome: Progressing   Goal Outcome Evaluation:       Disorganized. Disoriented to situation. Flight of ideas. Tangential. Patient denies audio or visual hallucinations, denies SI/HI. Patient had a spiritual health services consult today per his request. Patient spoke to the  about believing that he has a demon inside him. Patient stated the meeting was helpful and thanked writer for it. Patient tangential, and hyperverbal. In the AM patient needed to be redirected for attempting to urinate in the hallway. Patient stated he felt he had to urinate but that he couldn't go in his toilet so he was trying the hallway. Patient responded to the redirection. Patient then went to his room and stated he had a cup in his pants and stated he was going to try to pee in the cup, since he couldn't in the toilet. Writer encouraged patient to drink more fluids, and to wait some time before trying to urinating  again in the toilet. Patient followed this recommendation, and then reported that he was successful at urinating in the toilet. Patient attended some programming and was medication compliant. Writer gave patient PRN methocarbamol and Tylenol for 6/10 pain, as well as nicotine lozenges. Patient reported the tylenol was effective and resolved his pain. Patient has two beds in his room. One of the beds, the one he isn't using was seen to have a yellow sticky substance spilled all over it (patient stated it was spilled orange juice). There also were white chalky pieces stuck to the mattress in the dried orange juice. Patient stated it was likely nicotine lozenges that he had spit out. Writer assisted patient with cleaning this mattress. Writer encouraged  patient to  reach out to staff in the future if he ever needs help cleaning. Patient verbalized understanding.
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
## 2024-07-02 NOTE — PLAN OF CARE
Problem: Adult Behavioral Health Plan of Care  Goal: Plan of Care Review  Outcome: Progressing     Problem: Sleep Disturbance  Goal: Adequate Sleep/Rest  Outcome: Progressing   Goal Outcome Evaluation:  Pt is noted sleeping comfortably with even and non labored respirations during all safety checks. Pt slept for 5.75 hours. No safety or behavioral concerns noted. Will continue to monitor and treat as ordered.

## 2024-07-02 NOTE — PLAN OF CARE
Team Note Due:  Friday    Assessment/Intervention/Current Symtoms and Care Coordination:  Chart review and met with team, discussed pt progress, symptomology, and response to treatment.  Discussed the discharge plan and any potential impediments to discharge.      Sent 72 hour hold to Mariann Hernandez at the [facility] Attorney's [facility].  They are working on getting the court hold.         Discharge Plan or Goal:  IRTS       Barriers to Discharge:  Pt is ill and does not have providers     Referral Status:  Too early  Pt hasn o established providers       Legal Status:  72 hour hold     They are supporting the petition for commitment.      County: [place]  File Number:   Start and expiration date of commitment:     Contacts:    CLIFTON for aunt.Aunt:Nita @ [phone])  Father:Subhash @ [phone]        Upcoming Meetings and Dates/Important Information and next steps:      Waiting for court hold

## 2024-07-03 PROCEDURE — 250N000013 HC RX MED GY IP 250 OP 250 PS 637: Performed by: PSYCHIATRY & NEUROLOGY

## 2024-07-03 PROCEDURE — 250N000013 HC RX MED GY IP 250 OP 250 PS 637: Performed by: CLINICAL NURSE SPECIALIST

## 2024-07-03 PROCEDURE — 90853 GROUP PSYCHOTHERAPY: CPT | Performed by: COUNSELOR

## 2024-07-03 PROCEDURE — 99232 SBSQ HOSP IP/OBS MODERATE 35: CPT | Performed by: PSYCHIATRY & NEUROLOGY

## 2024-07-03 PROCEDURE — 124N000002 HC R&B MH UMMC

## 2024-07-03 PROCEDURE — 250N000013 HC RX MED GY IP 250 OP 250 PS 637: Performed by: EMERGENCY MEDICINE

## 2024-07-03 RX ADMIN — HYDROXYZINE HYDROCHLORIDE 50 MG: 50 TABLET, FILM COATED ORAL at 16:15

## 2024-07-03 RX ADMIN — NICOTINE POLACRILEX 4 MG: 2 LOZENGE ORAL at 08:52

## 2024-07-03 RX ADMIN — OLANZAPINE 10 MG: 10 TABLET, ORALLY DISINTEGRATING ORAL at 19:28

## 2024-07-03 RX ADMIN — GABAPENTIN 300 MG: 300 CAPSULE ORAL at 19:28

## 2024-07-03 RX ADMIN — NICOTINE 1 PATCH: 21 PATCH, EXTENDED RELEASE TRANSDERMAL at 08:18

## 2024-07-03 RX ADMIN — METHOCARBAMOL 500 MG: 500 TABLET ORAL at 08:18

## 2024-07-03 RX ADMIN — GABAPENTIN 300 MG: 300 CAPSULE ORAL at 13:52

## 2024-07-03 RX ADMIN — NICOTINE POLACRILEX 4 MG: 2 LOZENGE ORAL at 12:35

## 2024-07-03 RX ADMIN — OLANZAPINE 10 MG: 10 TABLET, ORALLY DISINTEGRATING ORAL at 08:18

## 2024-07-03 RX ADMIN — TRAZODONE HYDROCHLORIDE 50 MG: 50 TABLET ORAL at 02:01

## 2024-07-03 RX ADMIN — Medication 50 MCG: at 19:28

## 2024-07-03 RX ADMIN — GABAPENTIN 300 MG: 300 CAPSULE ORAL at 08:18

## 2024-07-03 RX ADMIN — OLANZAPINE 10 MG: 10 TABLET, FILM COATED ORAL at 12:34

## 2024-07-03 ASSESSMENT — ACTIVITIES OF DAILY LIVING (ADL)
ADLS_ACUITY_SCORE: 28
DRESS: INDEPENDENT
ADLS_ACUITY_SCORE: 28
LAUNDRY: WITH SUPERVISION
ADLS_ACUITY_SCORE: 28
HYGIENE/GROOMING: INDEPENDENT
ADLS_ACUITY_SCORE: 28
ADLS_ACUITY_SCORE: 28
ORAL_HYGIENE: INDEPENDENT
ORAL_HYGIENE: PROMPTS;INDEPENDENT
ADLS_ACUITY_SCORE: 28
ADLS_ACUITY_SCORE: 28
HYGIENE/GROOMING: INDEPENDENT
ADLS_ACUITY_SCORE: 28
DRESS: INDEPENDENT
ADLS_ACUITY_SCORE: 28
LAUNDRY: WITH SUPERVISION

## 2024-07-03 NOTE — PROGRESS NOTES
Rehab Group    Start time: 1700  End time: 1750  Patient time total: 45 minutes    attended partial group    #6 attended   Group Type: relaxation and recreation   Group Topic Covered: relaxation        Group Session Detail:  Stress relief and exercise     Patient Response/Contribution:  cooperative with task, attentive, and actively engaged       Patient Detail:    Pt actively participated in a structured Therapeutic Recreation group with a focus on leisure participation, socializing, and exercise. Pt participated in the guided exercise for about half of the group, leaving on a few occasions throughout the time. Pt followed along, engaged in the guided chair exercise routine and added to the discussion prompts throughout the routine.  Pt was encouraged to use positive imagery with the deep breathing and stretching to foster relaxation, improves focus, and reduce stress.        Activity Therapy Per 15 minutes () Other Rehab therapies    Patient Active Problem List   Diagnosis    Anxiety    Adjustment disorder with anxious mood    Polysubstance abuse (H)    Manic behavior (H)    Psychosis (H)    Bipolar affective disorder, current episode manic (H)    Schizoaffective disorder, bipolar type (H)    Suicidal ideation    Agitation    Itzel (H)
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
## 2024-07-03 NOTE — PLAN OF CARE
Pt was asleep to start the shift then was up and down for roughly an hour after 0100. Pt given PRN 50 mg. Trazodone for sleep @ 0201. Pt appeared to sleep for a total of 5.75 hours overnight. No other PRN medications were administered and no other concerns were noted.     Problem: Sleep Disturbance  Goal: Adequate Sleep/Rest  Outcome: Progressing

## 2024-07-03 NOTE — PROGRESS NOTES
"[facility], [place]   Psychiatric Progress Note        Interim History:   The patient's care was discussed with the treatment team during the daily team meeting and/or staff's chart notes were reviewed.  Staff report patient again slept for 5.75 hours last night. He continued with SIO and No roommate orders. Was more compliant with his Zyprexa Zydis and other meds, still needed multiple redirections because of agitated and out of control behavior, see RN's note below:    \"Milieu Participation:Behavior: Pt presents as hyperactive, loud at times, tangential with flight of ideas. Pt willing to try non pharmacological interventions to help distract and calm. Stated taking shower, walking espinoza back and fourth with writer, watching tv and going to group were helpful distractions. Pt redirectable when he took shirt off twice, blocked others view when watching TV. PRN hydroxyzine 50 mg given for anxiety. No overt agitation or aggression this shift. Providing choices when redirecting appeared to have best results. Pt stated nicorette lozenges and patch helped with cravings. PRN nicorette lozenge 4 mg x 3 given. Medication compliant with no noted or observed side effects. Denies SI, SIB, AVH, depression and anxiety.     Safety: continues on SIO 10 fett and is on SI, Fall, Elopement, Assault precautions.  Aggression/agitation/HI: denies, exhibited safe behavior   Affect: hyper Mood: consistent with affect     Physical Complaints/Issues: denies  I & O: eating and drinking well 100%  Sleep: denies concerns  LBM: denies concerns  ADLs: independent showered  Visits/calls: none     Vitals: Blood pressure (!) 143/82, pulse 102, temperature 97  F (36.1  C), temperature source Oral, resp. rate 16, height 1.88 m (6' 2\"), weight 78.9 kg (173 lb 14.4 oz), SpO2 98%.  Pain\"     Met with patient: patient was waiting to meet with me, willingly went to the conference room and during first minutes of our visit was " "pleasant, though, continued to ramble and was difficult to interrupt. He repeatedly stated that he felt all better, that he started taking his meds and would continue to take them outside: \"because I recognize that I need to take meds and I want to apologize for my behavior prior to today. Can you discharge me home?\" I tried to explain multiple times that he still needed to be at this hospital and was going through commitment hearing. Serafin was not receptive and continued to maintain that this provider has a final word in his discharge and could discharge him without any problems \"or at least, let me go home for a 4th of July and, then, I will come back. Mar needs me outside!\" I again and again told Serafin that we could not do that. As he realized that he would not be discharged today, he stood up, said that he was not interested in our conversation anymore and walked outside of the conference room.         Medications:     Current Facility-Administered Medications   Medication Dose Route Frequency Provider Last Rate Last Admin    gabapentin (NEURONTIN) capsule 300 mg  300 mg Oral TID Jese Arizmendi MD   300 mg at 07/03/24 1352    nicotine (NICODERM CQ) 21 MG/24HR 24 hr patch 1 patch  1 patch Transdermal Daily Jese Arizmendi MD   1 patch at 07/03/24 0818    OLANZapine zydis (zyPREXA) ODT tab 10 mg  10 mg Oral BID Jese Arizmendi MD   10 mg at 07/03/24 0818    Vitamin D3 (CHOLECALCIFEROL) tablet 50 mcg  50 mcg Oral At Bedtime Maria Pennington APRN CNP   50 mcg at 07/02/24 3756          Allergies:     Allergies   Allergen Reactions    Paliperidone Other (See Comments)     Other reaction(s): Extrapyramidal Symptoms   Significant EPS    Significant EPS    Pork Allergy Other (See Comments)     Sikh prohibition          Labs:   No results found for this or any previous visit (from the past 24 hour(s)).       Psychiatric Examination:     BP (!) 143/82   Pulse 102   Temp 97.6  F (36.4  C) " "(Oral)   Resp 16   Ht 1.88 m (6' 2\")   Wt 78.9 kg (173 lb 14.4 oz)   SpO2 99%   BMI 22.33 kg/m    Weight is 173 lbs 14.4 oz  Body mass index is 22.33 kg/m .    Orthostatic Vitals         Most Recent      Sitting Orthostatic /73 07/03 0829    Sitting Orthostatic Pulse (bpm) 83 07/03 0829    Standing Orthostatic /82 07/03 0829    Standing Orthostatic Pulse (bpm) 105 07/03 0829           Appearance: dressed in hospital scrubs  Attitude:  somewhat? cooperative  Eye Contact:  fair  Mood:  labile, irritable  Affect:  intensity is exaggerated and intensity is dramatic  Speech:  pressured speech  Psychomotor Behavior:  no evidence of tardive dyskinesia, dystonia, or tics and physical agitation  Throught Process:  disorganized and illogical  Associations:  loosening of associations present  Thought Content:  grandiose delusions are likely  Insight:  limited  Judgement:  poor  Oriented to:  time, person, and place  Attention Span and Concentration:  poor  Recent and Remote Memory:  poor    Clinical Global Impressions    First: 7/4 7/1/2024      Most recent:            Precautions:     Behavioral Orders   Procedures    Assault precautions    Code 1 - Restrict to Unit    Elopement precautions    Fall precautions    Routine Programming     As clinically indicated    Status 15     Every 15 minutes.    Status Individual Observation     Patient SIO status reviewed with team/RN.  Please also refer to RN/team documentation for add'l detail.    -SIO staff to monitor following which have contributed to patient being on SIO:  Pt was observed pushing on security staff in the ER with intent of leaving the hospital.  -Possible interventions SIO staff could use to support patient's treatment progress:  Offer redirections   -When following observed, team will review discontinuation of SIO:  Able to sign in voluntary and contract for safety     Order Specific Question:   CONTINUOUS 24 hours / day     Answer:   Other     Order " Specific Question:   Specify distance     Answer:   10     Order Specific Question:   Indications for SIO     Answer:   Assault risk     Order Specific Question:   Indications for SIO     Answer:   Severe intrusiveness    Suicide precautions: Suicide Risk: HIGH; Clinical rationale to override score: modification to the care environment     Patients on Suicide Precautions should have a Combination Diet ordered that includes a Diet selection(s) AND a Behavioral Tray selection for Safe Tray - with utensils, or Safe Tray - NO utensils       Order Specific Question:   Suicide Risk     Answer:   HIGH     Order Specific Question:   Clinical rationale to override score:     Answer:   modification to the care environment          DIagnoses:     Bipolar affective disorder, candie vs Schizoaffective disorder, bipolar type, manic.  Historical dx of stimulants use disorder.         Plan:     Per [facility], as of now patient is still on back to back 72 hour hold due to transferring his case from [place] to [facility]. We are waiting for a court hold from [facility]. We increased dose of Zyprexa Zydis on 7/2, No medication changes today 7/3/2024. Will continue with SIO and No roommate. Will continue to provide support and structure and encourage taking scheduled Zyprexa.     Total time spent was 36 minutes. Over 50% of times was spent counseling and coordination of care regarding coping skills, medication and discharge planning.

## 2024-07-03 NOTE — PLAN OF CARE
"    Team Note Due:  Friday    Assessment/Intervention/Current Symtoms and Care Coordination:  Chart review and met with team, discussed pt progress, symptomology, and response to treatment.  Discussed the discharge plan and any potential impediments to discharge.      I informed patient this morning that we are waiting for [facility] to oome with paperwork that shows hi m court dates, report as to what people said and his 's number.  He was glad to hear this.  He made comments that he was expecting to go as it is the 4th of July and has has fireworks to get to as \"it Is performance time.\"    Pt continued to ask for contact to call [facility]. RN collaborated with me and he will communicate to patient again that he will need to wait for paperwork.    I spoke with Mariann Hernandez at the [facility] Attorney's Office and is still working through some technical difficulties before she can file this case.         Discharge Plan or Goal:  IRTS       Barriers to Discharge:  Pt is ill and does not have providers     Referral Status:  Too early  Pt hasn o established providers       Legal Status:  72 hour hold     They are supporting the petition for commitment.      County: [facility]  File Number:   Start and expiration date of commitment:     Contacts:    CLIFTON for aunt.Aunt:Nita @ [phone])  Father:Subhash @ [phone]        Upcoming Meetings and Dates/Important Information and next steps:      Waiting for court hold                            "

## 2024-07-03 NOTE — PLAN OF CARE
BEH IP Unit Acuity Rating Score (UARS)  Patient is given one point for every criteria they meet.    CRITERIA SCORING   On a 72 hour hold, court hold, committed, stay of commitment, or revocation. 1    Patient LOS on BEH unit exceeds 20 days. 0  LOS: 6   Patient under guardianship, 55+, otherwise medically complex, or under age 11. 0   Suicide ideation without relief of precipitating factors. 0   Current plan for suicide. 0   Current plan for homicide. 0   Imminent risk or actual attempt to seriously harm another without relief of factors precipitating the attempt. 0   Severe dysfunction in daily living (ex: complete neglect for self care, extreme disruption in vegetative function, extreme deterioration in social interactions). 1   Recent (last 7 days) or current physical aggression in the ED or on unit. 0   Restraints or seclusion episode in past 72 hours. 0   Recent (last 7 days) or current verbal aggression, agitation, yelling, etc., while in the ED or unit. 1 6 days since 6/27     Active psychosis. 1   Need for constant or near constant redirection (from leaving, from others, etc).  1   Intrusive or disruptive behaviors. 1   Patient requires 3 or more hours of individualized nursing care per 8-hour shift (i.e. for ADLs, meds, therapeutic interventions). 0   TOTAL 6

## 2024-07-03 NOTE — PLAN OF CARE
Goal Outcome Evaluation:      Group Attendance:  came in and out of group session    Time session began: 1:05 pm  Time session ended: 1:50 pm  Patient's total time in group: 45    Total # Attendees   5   Group Type psychotherapeutic     Group Topic Covered incorporating skill sets  One-mindfully: in-the-moment      Group Session Detail Process art and positive psychology- Glimmers     Patient's response to the group topic/interactions:  cooperative with task, attentive, actively engaged, and left group on several occasions     Patient Details: Pt actually did really well, some of the manic energy was contained well through art making. He was able to cite examples of the concept of glimmers ( small things that make him happy), very appropriate, speaking of volunteering and helping others. He also spoke about the difficulty of uncertainty and not knowing when he can discharge. He remained calm, he was restless and had some in and out of group but he was able to come back in a few times and focus on his collage task.           [address] - Group psychotherapy - 1 Session    Patient Active Problem List   Diagnosis    Anxiety    Adjustment disorder with anxious mood    Polysubstance abuse (H)    Manic behavior (H)    Psychosis (H)    Bipolar affective disorder, current episode manic (H)    Schizoaffective disorder, bipolar type (H)    Suicidal ideation    Agitation    Itzel (H)

## 2024-07-03 NOTE — PLAN OF CARE
"  Rehab Group    Start time: 10:15  End time: 11:10  Patient time total: 5-10 minutes    attended partial group    #5 attended   Group Type: OT Clinic   Group Topic Covered: balanced lifestyle, cognitive activities, coping skills, emotional regulation, healthy leisure time, and social skills     Group Session Detail:  Pt actively participated in occupational therapy clinic to facilitate coping skill exploration, creative expression within personally meaningful activities, and clinical observation of social, cognitive, and kinesthetic performance skills.     Patient Response/Contribution:  worked intermittently and engaged socially when prompted     Patient Detail:  Patient entered group with SIO staff present. Patient presented with high energy and was easily excitable when shown creative options available to work on in group. Patient selected a fuzzy poster project to work on this date. Patient was quite social with others and appeared distracted by conversation. Patient was inattentive to project at times and made minimal progress this date. Patient left group early without return. Patient later approached writer in the hallway requesting to continue work on his project as he \"forgot to come back to group\" earlier in the morning. Patient was receptive and understanding that group had concluded and he would be unable to continue work on his project at this time.           No Charge    Patient Active Problem List   Diagnosis    Anxiety    Adjustment disorder with anxious mood    Polysubstance abuse (H)    Manic behavior (H)    Psychosis (H)    Bipolar affective disorder, current episode manic (H)    Schizoaffective disorder, bipolar type (H)    Suicidal ideation    Agitation    Itzel (H)        "

## 2024-07-04 PROCEDURE — 124N000002 HC R&B MH UMMC

## 2024-07-04 PROCEDURE — 99232 SBSQ HOSP IP/OBS MODERATE 35: CPT | Performed by: PSYCHIATRY & NEUROLOGY

## 2024-07-04 PROCEDURE — 250N000013 HC RX MED GY IP 250 OP 250 PS 637: Performed by: PSYCHIATRY & NEUROLOGY

## 2024-07-04 PROCEDURE — 250N000013 HC RX MED GY IP 250 OP 250 PS 637: Performed by: CLINICAL NURSE SPECIALIST

## 2024-07-04 PROCEDURE — 250N000013 HC RX MED GY IP 250 OP 250 PS 637: Performed by: EMERGENCY MEDICINE

## 2024-07-04 RX ORDER — GABAPENTIN 400 MG/1
400 CAPSULE ORAL 3 TIMES DAILY
Status: DISCONTINUED | OUTPATIENT
Start: 2024-07-04 | End: 2024-08-09 | Stop reason: HOSPADM

## 2024-07-04 RX ADMIN — LORAZEPAM 2 MG: 2 TABLET ORAL at 17:43

## 2024-07-04 RX ADMIN — HYDROXYZINE HYDROCHLORIDE 50 MG: 50 TABLET, FILM COATED ORAL at 14:16

## 2024-07-04 RX ADMIN — GABAPENTIN 300 MG: 300 CAPSULE ORAL at 13:03

## 2024-07-04 RX ADMIN — NICOTINE POLACRILEX 4 MG: 2 LOZENGE ORAL at 09:51

## 2024-07-04 RX ADMIN — ACETAMINOPHEN 650 MG: 325 TABLET, FILM COATED ORAL at 18:47

## 2024-07-04 RX ADMIN — Medication 50 MCG: at 22:27

## 2024-07-04 RX ADMIN — OLANZAPINE 10 MG: 10 TABLET, ORALLY DISINTEGRATING ORAL at 22:26

## 2024-07-04 RX ADMIN — GABAPENTIN 400 MG: 400 CAPSULE ORAL at 19:06

## 2024-07-04 RX ADMIN — OLANZAPINE 10 MG: 10 TABLET, ORALLY DISINTEGRATING ORAL at 08:29

## 2024-07-04 RX ADMIN — NICOTINE 1 PATCH: 21 PATCH, EXTENDED RELEASE TRANSDERMAL at 08:30

## 2024-07-04 RX ADMIN — HYDROXYZINE HYDROCHLORIDE 50 MG: 50 TABLET, FILM COATED ORAL at 10:42

## 2024-07-04 RX ADMIN — ACETAMINOPHEN 650 MG: 325 TABLET, FILM COATED ORAL at 13:03

## 2024-07-04 RX ADMIN — OLANZAPINE 10 MG: 10 TABLET, FILM COATED ORAL at 16:54

## 2024-07-04 RX ADMIN — DIPHENHYDRAMINE HYDROCHLORIDE 50 MG: 50 CAPSULE ORAL at 17:43

## 2024-07-04 RX ADMIN — GABAPENTIN 300 MG: 300 CAPSULE ORAL at 08:29

## 2024-07-04 RX ADMIN — HALOPERIDOL 5 MG: 5 TABLET ORAL at 17:43

## 2024-07-04 RX ADMIN — NICOTINE POLACRILEX 4 MG: 2 LOZENGE ORAL at 18:47

## 2024-07-04 ASSESSMENT — ACTIVITIES OF DAILY LIVING (ADL)
ADLS_ACUITY_SCORE: 28
ADLS_ACUITY_SCORE: 28
LAUNDRY: WITH SUPERVISION
ADLS_ACUITY_SCORE: 28
ORAL_HYGIENE: INDEPENDENT
DRESS: INDEPENDENT
HYGIENE/GROOMING: INDEPENDENT

## 2024-07-04 NOTE — PLAN OF CARE
"Goal Outcome Evaluation:    Pt spent most of the shift in his room resting in bed but was observed out in the lounge area for meals, snacks and medications. Pt continues to present as hypermanic, loud with flight of ideas. Pt continues on SIO 1:1 this shift intrusive and disorganized behaviors. Pt required multiple redirections this shift from SIO staff. Pt at one point was observed running down the hallways. Pt reported high anxiety this shift but denied depression. Pt reported \" my mood is fantastic\" upon check in. Pt denied SI/SIB/HI/AVH this shift. Pt was medication compliant and denied any side effects. Pt reported appetite and sleep as adequate. Staff will continue to monitor and support with current POC.    Plan of Care Reviewed With: patient        "

## 2024-07-04 NOTE — PLAN OF CARE
Goal Outcome Evaluation:    Plan of Care Reviewed With: patient Plan of Care Reviewed With: patient      Plan of Care Reviewed With: patient      VS Stable, LLE pain rated at 5-6/10 scheduled gabapentin given x2, tylenol x1 and ice packs.  Pt spent as much of the shift in his room resting as he was out in Milieu.  Pt presented as hypermanic, loud with flight of ideas. Pt also on SIO 1:1, intrusive and disorganized behaviors as well. Denied hallucinations. Pt stated high anxiety asking for medication; Hydroxyzine given x2.        Pt denied depression, SI/SIB/HI/AVH. Med compliant and denied any side effects.   Staff will continue to monitor and support.

## 2024-07-04 NOTE — PLAN OF CARE
Goal Outcome Evaluation:      Group Attendance:  attended partial group    Time session began: 10:20 am  Time session ended: 10:55 am  Patient's total time in group: 10    Total # Attendees   6   Group Type psychotherapeutic     Group Topic Covered symptom management  TIP: body Temperature, Intensely exercise, Progressive       Group Session Detail Mindful Process art and conversation     Patient's response to the group topic/interactions:  left group on several occasions, distracted , disorganized, inattentive, and unable to sequence the task     Patient Details: Mood, anxiety, but had an amazing coffee. Not as agitated as previous but very restless, wanted to sit by door, afraid he would need to exit quickly to get an anxiety medication, which he did. He did not return to group           No Charge (0-15 min)    Patient Active Problem List   Diagnosis    Anxiety    Adjustment disorder with anxious mood    Polysubstance abuse (H)    Manic behavior (H)    Psychosis (H)    Bipolar affective disorder, current episode manic (H)    Schizoaffective disorder, bipolar type (H)    Suicidal ideation    Agitation    Itzel (H)

## 2024-07-04 NOTE — PLAN OF CARE
Pt appeared to sleep for a total of 5.25 hours overnight. No PRN medications were administered and no concerns were noted.     Problem: Sleep Disturbance  Goal: Adequate Sleep/Rest  Outcome: Progressing

## 2024-07-04 NOTE — PLAN OF CARE
Goal Outcome Evaluation:      Group Attendance:  came in and out of group session    Time session began: 5:15 pm  Time session ended: 6:00 pm  Patient's total time in group: 10 min    Total # Attendees   4-8   Group Type psychotherapeutic     Group Topic Covered emotional regulation  Observe: just notice      Group Session Detail Move Mindfully yoga and body scan practice and discuss     Patient's response to the group topic/interactions:  left group on several occasions, distracted , disorganized, and inattentive     Patient Details: Mood calm, said he was coached to take things day by day and not ask so often when he will leave, and just accept this, which was an insightful comment. Pt is very restless, he couldn't sit still , he left and did not return to group.           No Charge (0-15 min)    Patient Active Problem List   Diagnosis    Anxiety    Adjustment disorder with anxious mood    Polysubstance abuse (H)    Manic behavior (H)    Psychosis (H)    Bipolar affective disorder, current episode manic (H)    Schizoaffective disorder, bipolar type (H)    Suicidal ideation    Agitation    Itzel (H)

## 2024-07-04 NOTE — PROGRESS NOTES
"Combination PRN Medication Administration    Medications Administered: Benadryl+Haldol+Ativan combination    What patient symptom(s) led to the administration of these medications?  Pt became agitated and irritable begun disrobing and running up and down the hallways refusing redirections from SIO staff and this writer. Pt was observed rolling down the floor screaming saying \" the demon inside me is making me so hot and its not dying or leaving me alone.\"    What interventions were attempted to avoid use of these medication (including other PRN medications)?   Staff offered snacks, music via headphones and shower    What were the patient's response(s) to the interventions?   Give medications or let me have my freedom so that I can do whatever I want and with nobody telling me what to do.    Provider notified: Dr. GIRON       Date: 07/04/24        Time: 5:53 PM      Colten Dawn RN    "

## 2024-07-04 NOTE — PROGRESS NOTES
"[facility], [place]   Psychiatric Progress Note        Interim History:   The patient's care was discussed with the treatment team during the daily team meeting and/or staff's chart notes were reviewed.  Staff report patient again slept for 5.25 hours last night. For the last couple of days he has been more compliant with his meds. He was continued on SIO and No roommate order because of still present candie, disorganized and intrusive behavior, see RN's note below:    \"Pt spent most of the shift in his room resting in bed but was observed out in the lounge area for meals, snacks and medications. Pt continues to present as hypermanic, loud with flight of ideas. Pt continues on SIO 1:1 this shift intrusive and disorganized behaviors. Pt required multiple redirections this shift from SIO staff. Pt at one point was observed running down the hallways. Pt reported high anxiety this shift but denied depression. Pt reported \" my mood is fantastic\" upon check in. Pt denied SI/SIB/HI/AVH this shift. Pt was medication compliant and denied any side effects. Pt reported appetite and sleep as adequate. Staff will continue to monitor and support with current POC.\"     Met with patient: patient was seen in the conference room in presence of SIO staff. Serafin went there willingly and immediately told me that this time he would be silent and would not interrupt me. He was able to maintain silent for only few seconds and started asking me if I heard anything from [facility] about his release. I told Serafin that today was 4th of July and all official organizations were closed, but it would be unlikely that he could be discharged because he is going through the court. Serafin didn't like what I said and immediately told me that I could overrule the court and let him go home today and he would return to this hospital tomorrow. I again told Serafin that I could not do that. He, then, stood up, said that he had nothing " "else to say and walked out of the conference room.     Later I was informed by RN that patient approached her and requested to increase his dose of Gabapentin.          Medications:     Current Facility-Administered Medications   Medication Dose Route Frequency Provider Last Rate Last Admin    gabapentin (NEURONTIN) capsule 400 mg  400 mg Oral TID Jese Arizmendi MD        nicotine (NICODERM CQ) 21 MG/24HR 24 hr patch 1 patch  1 patch Transdermal Daily Jese Arizmendi MD   1 patch at 07/04/24 0830    OLANZapine zydis (zyPREXA) ODT tab 10 mg  10 mg Oral BID Jese Arizmendi MD   10 mg at 07/04/24 0829    Vitamin D3 (CHOLECALCIFEROL) tablet 50 mcg  50 mcg Oral At Bedtime Maria Pennington APRN CNP   50 mcg at 07/03/24 1928          Allergies:     Allergies   Allergen Reactions    Paliperidone Other (See Comments)     Other reaction(s): Extrapyramidal Symptoms   Significant EPS    Significant EPS    Pork Allergy Other (See Comments)     Anabaptism prohibition          Labs:   No results found for this or any previous visit (from the past 24 hour(s)).       Psychiatric Examination:     /85 (Patient Position: Sitting)   Pulse 94   Temp 98.2  F (36.8  C)   Resp 16   Ht 1.88 m (6' 2\")   Wt 78.9 kg (173 lb 14.4 oz)   SpO2 100%   BMI 22.33 kg/m    Weight is 173 lbs 14.4 oz  Body mass index is 22.33 kg/m .    Orthostatic Vitals         Most Recent      Sitting Orthostatic /73 07/03 0829    Sitting Orthostatic Pulse (bpm) 83 07/03 0829    Standing Orthostatic /82 07/03 0829    Standing Orthostatic Pulse (bpm) 105 07/03 0829           Appearance: dressed in hospital scrubs  Attitude:  minimally cooperative  Eye Contact:  fair  Mood:  labile, irritable  Affect:  intensity is exaggerated and intensity is dramatic  Speech:  pressured speech  Psychomotor Behavior:  no evidence of tardive dyskinesia, dystonia, or tics and physical agitation  Throught Process:  disorganized and " illogical  Associations:  loosening of associations present  Thought Content:  grandiose delusions are likely  Insight:  limited  Judgement:  poor  Oriented to:  time, person, and place  Attention Span and Concentration:  poor  Recent and Remote Memory:  poor    Clinical Global Impressions    First: 7/4 7/1/2024      Most recent:            Precautions:     Behavioral Orders   Procedures    Assault precautions    Code 1 - Restrict to Unit    Elopement precautions    Fall precautions    Routine Programming     As clinically indicated    Status 15     Every 15 minutes.    Status Individual Observation     Patient SIO status reviewed with team/RN.  Please also refer to RN/team documentation for add'l detail.    -SIO staff to monitor following which have contributed to patient being on SIO:  Pt was observed pushing on security staff in the ER with intent of leaving the hospital.  -Possible interventions SIO staff could use to support patient's treatment progress:  Offer redirections   -When following observed, team will review discontinuation of SIO:  Able to sign in voluntary and contract for safety     Order Specific Question:   CONTINUOUS 24 hours / day     Answer:   Other     Order Specific Question:   Specify distance     Answer:   10     Order Specific Question:   Indications for SIO     Answer:   Assault risk     Order Specific Question:   Indications for SIO     Answer:   Severe intrusiveness    Suicide precautions: Suicide Risk: HIGH; Clinical rationale to override score: modification to the care environment     Patients on Suicide Precautions should have a Combination Diet ordered that includes a Diet selection(s) AND a Behavioral Tray selection for Safe Tray - with utensils, or Safe Tray - NO utensils       Order Specific Question:   Suicide Risk     Answer:   HIGH     Order Specific Question:   Clinical rationale to override score:     Answer:   modification to the care environment          DIagnoses:      Bipolar affective disorder, candie vs Schizoaffective disorder, bipolar type, manic.  Historical dx of stimulants use disorder.         Plan:     Per CTC, as of now patient is still on back to back 72 hour hold due to transferring his case from [place] to [facility]. We are waiting for a court hold from [facility]. We increased dose of Zyprexa Zydis on 7/2, will increase Gabapentin, no medication changes today 7/4/2024. Will continue with SIO and No roommate. Will continue to provide support and structure and encourage taking scheduled Zyprexa.     Total time spent was 36 minutes. Over 50% of times was spent counseling and coordination of care regarding coping skills, medication and discharge planning.

## 2024-07-05 PROCEDURE — 250N000013 HC RX MED GY IP 250 OP 250 PS 637: Performed by: PSYCHIATRY & NEUROLOGY

## 2024-07-05 PROCEDURE — 124N000002 HC R&B MH UMMC

## 2024-07-05 PROCEDURE — 99232 SBSQ HOSP IP/OBS MODERATE 35: CPT | Performed by: PSYCHIATRY & NEUROLOGY

## 2024-07-05 RX ADMIN — OLANZAPINE 10 MG: 10 TABLET, FILM COATED ORAL at 17:17

## 2024-07-05 RX ADMIN — DIPHENHYDRAMINE HYDROCHLORIDE 50 MG: 50 CAPSULE ORAL at 09:41

## 2024-07-05 RX ADMIN — METHOCARBAMOL 500 MG: 500 TABLET ORAL at 15:16

## 2024-07-05 RX ADMIN — ACETAMINOPHEN 650 MG: 325 TABLET, FILM COATED ORAL at 15:16

## 2024-07-05 RX ADMIN — OLANZAPINE 10 MG: 10 TABLET, ORALLY DISINTEGRATING ORAL at 07:01

## 2024-07-05 RX ADMIN — LORAZEPAM 2 MG: 2 TABLET ORAL at 09:41

## 2024-07-05 RX ADMIN — GABAPENTIN 400 MG: 400 CAPSULE ORAL at 15:15

## 2024-07-05 RX ADMIN — GABAPENTIN 400 MG: 400 CAPSULE ORAL at 07:01

## 2024-07-05 RX ADMIN — NICOTINE 1 PATCH: 21 PATCH, EXTENDED RELEASE TRANSDERMAL at 07:02

## 2024-07-05 RX ADMIN — HALOPERIDOL 5 MG: 5 TABLET ORAL at 09:41

## 2024-07-05 ASSESSMENT — ACTIVITIES OF DAILY LIVING (ADL)
HYGIENE/GROOMING: HANDWASHING;SHOWER;INDEPENDENT
ADLS_ACUITY_SCORE: 41
DRESS: INDEPENDENT
ADLS_ACUITY_SCORE: 41
ORAL_HYGIENE: INDEPENDENT
ADLS_ACUITY_SCORE: 28
ADLS_ACUITY_SCORE: 41
LAUNDRY: WITH SUPERVISION
DRESS: SCRUBS (BEHAVIORAL HEALTH);INDEPENDENT
ADLS_ACUITY_SCORE: 28
ADLS_ACUITY_SCORE: 41
ORAL_HYGIENE: INDEPENDENT
ADLS_ACUITY_SCORE: 41
ADLS_ACUITY_SCORE: 41
ADLS_ACUITY_SCORE: 28
ADLS_ACUITY_SCORE: 28
ADLS_ACUITY_SCORE: 41
ADLS_ACUITY_SCORE: 28
HYGIENE/GROOMING: INDEPENDENT
ADLS_ACUITY_SCORE: 41
ADLS_ACUITY_SCORE: 28
LAUNDRY: UNABLE TO COMPLETE
ADLS_ACUITY_SCORE: 28
ADLS_ACUITY_SCORE: 41
ADLS_ACUITY_SCORE: 41
ADLS_ACUITY_SCORE: 28

## 2024-07-05 NOTE — PLAN OF CARE
07/05/24 1615   Individualization/Patient Specific Goals   Patient Personal Strengths family/social support   Patient Vulnerabilities housing insecurity;lacks insight into illness;poor impulse control;occupational insecurity;history of unsuccessful treatment;substance abuse/addiction   Anxieties, Fears or Concerns pt has been asking about the court situation   Individualized Care Needs pt now on a court hold from the [facility] and needs court hold order   Patient/Family-Specific Goals (Include Timeframe) pt is not able to identify a goal   Interprofessional Rounds   Summary Pt presents with psychoses and presumed drug addiction but UDS is negative. Pt is now on a court hold   Participants nursing;psychiatrist;CTC;pharmacy   Behavioral Team Discussion   Participants Jese Arizmendi MD, Ginny HENRIQUEZ, Duy Prince RN, Pharmacy staff   Progress Pt is quite agiated and disruptive. He is taking  medications.   Anticipated length of stay 3 weeks   Continued Stay Criteria/Rationale the pt is a danger to himself.   Medical/Physical no active issues were discussed   Precautions suicide, fall, assault elopement   Plan Petition for committment   Rationale for change in precautions or plan na   Safety Plan bronson be completed by unit therapist   Anticipated Discharge Disposition IRTS     Goal Outcome Evaluation:

## 2024-07-05 NOTE — PLAN OF CARE
Goal Outcome Evaluation:    Plan of Care Reviewed With: patient    Pt spent about 50% of th shift in common areas of the unit. Pt is restless and hyper verbal. Pt is restless when out of room pacing espinoza and frequently requesting medications. Pt became angry agitated when talking with Dr GIRON and threw items from desk on to the floor. Pt stated that he hated his dr.  Pt attempted to use a water cup as a urinal in the lounge this morning ,but responded to redirection. Pt wearing his slippers on his head. Pt continues on SIO for intrusive disorganized behavior. Pt was medication compliant accepting of and seeking PRN medications. Pt took 3 naps this shift. Pt ate 100% of both breakfast and lunch . Pt denied any suicidal thoughts.                 Pt getting angry about being hungry and if not given real food, she is going to leave AMA. Advised pt against leaving as she is still on hi-flow oxygen and continuous neb. Pt given fruit cup and jello per ER doc. Orders from admitting doc for NPO. Admitting doc updated. Called into pt and advised her on need to stay and the reason for NPO at this time. Pt getting more agitated.

## 2024-07-05 NOTE — PLAN OF CARE
Patient in bed at the start of the shift and  appeared to be comfortably asleep and breathing with ease throughout the shift. Patient did awake once while this writer was covering an SIO, patient requested his morning medications from the RN Charge nurse Elisa, please see her note. Patient slept for 6.25 hours of the over night shift with no s/s of acute distress. SIO in place according to order. Patient experienced no safety events or escalations during the shift, no concerns reported or observed. Safety checks completed at least every 15 minutes. Patient required no PRN medications, see MAR. Patient has a no roommate order because they remain on assault precautions, disruptive at nights and intrusive. Patient remains on suicide, fall and elopement precautions as well. Nursing will continue to monitor.

## 2024-07-05 NOTE — PLAN OF CARE
Team Note Due:  Friday    Assessment/Intervention/Current Symtoms and Care Coordination:  Chart review and met with team, discussed pt progress, symptomology, and response to treatment.  Discussed the discharge plan and any potential impediments to discharge.    Pt continues with agitation and disorganization.  Court hold called in for 3:15PM.  Paperwork faxed over.  Navid LA and I entered the patient's room and he was in bed. We informed him of the court hold. He said that he can ask Navid later for more information.  I e-mailed MD for court hold order.       Discharge Plan or Goal:  IRTS       Barriers to Discharge:  Pt is ill and does not have providers     Referral Status:  Too early  Pt hasn o established providers       Legal Status:  Court hold as of 3:51 on 7/5/24      [facility]  File Number: [id number]  Start and expiration date of commitment:     Contacts:      HUSSEIN LAZO for aunt.Aunt:Nita @ [phone])  Father:Subhash @ [phone]        Upcoming Meetings and Dates/Important Information and next steps:      Waiting for court paperwork to be delivered with hearing dates.

## 2024-07-05 NOTE — PROGRESS NOTES
0425: Serafin came out with his 1:1 staff and requested his scheduled meds.  Informed that his next scheduled meds would be in the morning @ 0800.  This writer walked back with Serafin to his room. Pleasant, polite and remain  behaviorally controlled. Offered no complaints.  Returned back bed.  Continue to monitor, offer support and reassurance per care plan.

## 2024-07-05 NOTE — PLAN OF CARE
BEH IP Unit Acuity Rating Score (UARS)  Patient is given one point for every criteria they meet.    CRITERIA SCORING   On a 72 hour hold, court hold, committed, stay of commitment, or revocation. 1    Patient LOS on BEH unit exceeds 20 days. 0  LOS: 8   Patient under guardianship, 55+, otherwise medically complex, or under age 11. 0   Suicide ideation without relief of precipitating factors. 0   Current plan for suicide. 0   Current plan for homicide. 0   Imminent risk or actual attempt to seriously harm another without relief of factors precipitating the attempt. 0   Severe dysfunction in daily living (ex: complete neglect for self care, extreme disruption in vegetative function, extreme deterioration in social interactions). 1   Recent (last 7 days) or current physical aggression in the ED or on unit. 0   Restraints or seclusion episode in past 72 hours. 0   Recent (last 7 days) or current verbal aggression, agitation, yelling, etc., while in the ED or unit. 0     Active psychosis. 1   Need for constant or near constant redirection (from leaving, from others, etc).  1   Intrusive or disruptive behaviors. 1   Patient requires 3 or more hours of individualized nursing care per 8-hour shift (i.e. for ADLs, meds, therapeutic interventions). 0   TOTAL 6

## 2024-07-05 NOTE — PROGRESS NOTES
"[facility], [place]   Psychiatric Progress Note        Interim History:   The patient's care was discussed with the treatment team during the daily team meeting and/or staff's chart notes were reviewed.  Staff report patient slept for 6.25 hours last night and had day time naps. We still didn't get court hold from the [facility] and patient is continued on his 2nd, back to back 72 hour hold. Was more compliant with meds, but continued with disorganized and bizarre behavior, needing to be on SIO and no Roommate orders, see RN's note below:    \"What patient symptom(s) led to the administration of these medications?  Pt is very restless. Requesting prn medication for agitation. Pt put large water glass in his pants to use as a urinal while in the lounge. Disorganized.       What interventions were attempted to avoid use of these medication (including other PRN medications)?   Pt continues on SIO. Pt given redirection. Scheduled medications given at 7 am. Pt is requesting prn medication for restlessness and agitation.      What were the patient's response(s) to the interventions?   Will continue to assess . Pt observed to be resting in bed @ 1015. Medication given at 0940.\"    Met with patient: patient was seen first, in his room in presence of SIO staff, then, in common area. When I approached Serafin first time he was resting in his bed and didn't want to wake up. When he woke up he was pretty sedated and asked me \"to start from the clean slate\", apparently, meaning not to pay attention to his previous rude behavior. I agreed, asked what he wanted to talk about. Serafin immediately started asking about discharging him home today and I again told him that he was in the middle of court and I could not discharge him until he was done with it. Serafin got angry, stated that he never wanted to see my face and walked out of his room. After lunch he approached me again and asked to talk, insisted on going to the " "conference room. We went there with SIO staff. Serafin immediately started asking to discharge him: \"because I know something that neither you nor anyone else want to know\". When I asked what it was, Serafin said that if he were not to be discharged today, he \"would expose illuminati\". He continued to ramble about important political figures connected to him and being after him. He quickly looked patience when I indicated that we would not have any different conversation with him than earlier one and walked away. Significant tachycardia while standing up.         Medications:     Current Facility-Administered Medications   Medication Dose Route Frequency Provider Last Rate Last Admin    gabapentin (NEURONTIN) capsule 400 mg  400 mg Oral TID Jese Arizmendi MD   400 mg at 07/05/24 1515    nicotine (NICODERM CQ) 21 MG/24HR 24 hr patch 1 patch  1 patch Transdermal Daily Jese Arizmendi MD   1 patch at 07/05/24 0702    OLANZapine zydis (zyPREXA) ODT tab 10 mg  10 mg Oral BID Jese Arizmendi MD   10 mg at 07/05/24 0701    Vitamin D3 (CHOLECALCIFEROL) tablet 50 mcg  50 mcg Oral At Bedtime Maria Pennington APRN CNP   50 mcg at 07/04/24 2227          Allergies:     Allergies   Allergen Reactions    Paliperidone Other (See Comments)     Other reaction(s): Extrapyramidal Symptoms   Significant EPS    Significant EPS    Pork Allergy Other (See Comments)     Temple prohibition          Labs:   No results found for this or any previous visit (from the past 24 hour(s)).       Psychiatric Examination:     /80   Pulse 102   Temp 97.5  F (36.4  C) (Oral)   Resp 16   Ht 1.88 m (6' 2\")   Wt 78.9 kg (173 lb 14.4 oz)   SpO2 98%   BMI 22.33 kg/m    Weight is 173 lbs 14.4 oz  Body mass index is 22.33 kg/m .    Orthostatic Vitals         Most Recent      Sitting Orthostatic /85 07/05 0857    Sitting Orthostatic Pulse (bpm) 106 07/05 0857    Standing Orthostatic /63 07/05 0857    Standing " Orthostatic Pulse (bpm) 134 07/05 0857           Appearance: dressed in hospital scrubs  Attitude:  minimally cooperative  Eye Contact:  tense  Mood:  labile, irritable  Affect:  intensity is exaggerated and intensity is dramatic  Speech:  pressured speech  Psychomotor Behavior:  no evidence of tardive dyskinesia, dystonia, or tics and physical agitation  Throught Process: disorganized and illogical  Associations:  loosening of associations present  Thought Content:  grandiose delusions  Insight:  limited  Judgement:  poor  Oriented to:  time, person, and place  Attention Span and Concentration:  poor  Recent and Remote Memory:  poor    Clinical Global Impressions    First: 7/4 7/1/2024      Most recent:            Precautions:     Behavioral Orders   Procedures    Assault precautions    Code 1 - Restrict to Unit    Elopement precautions    Fall precautions    Routine Programming     As clinically indicated    Status 15     Every 15 minutes.    Status Individual Observation     Patient SIO status reviewed with team/RN.  Please also refer to RN/team documentation for add'l detail.    -SIO staff to monitor following which have contributed to patient being on SIO:  Pt was observed pushing on security staff in the ER with intent of leaving the hospital.  -Possible interventions SIO staff could use to support patient's treatment progress:  Offer redirections   -When following observed, team will review discontinuation of SIO:  Able to sign in voluntary and contract for safety     Order Specific Question:   CONTINUOUS 24 hours / day     Answer:   Other     Order Specific Question:   Specify distance     Answer:   10     Order Specific Question:   Indications for SIO     Answer:   Assault risk     Order Specific Question:   Indications for SIO     Answer:   Severe intrusiveness    Suicide precautions: Suicide Risk: HIGH; Clinical rationale to override score: modification to the care environment     Patients on Suicide  Precautions should have a Combination Diet ordered that includes a Diet selection(s) AND a Behavioral Tray selection for Safe Tray - with utensils, or Safe Tray - NO utensils       Order Specific Question:   Suicide Risk     Answer:   HIGH     Order Specific Question:   Clinical rationale to override score:     Answer:   modification to the care environment          DIagnoses:     Bipolar affective disorder, candie vs Schizoaffective disorder, bipolar type, manic.  Historical dx of stimulants use disorder.         Plan:     Per CTC, as of now patient is still on back to back 72 hour hold due to transferring his case from [place] to [facility]. We are waiting for a court hold from [facility]. We increased dose of Zyprexa Zydis on 7/2, will increase zyprexa zydis again 7/5/2024. Will continue with SIO and No roommate. Will continue to provide support and structure and encourage taking scheduled Zyprexa.     Total time spent was 39 minutes. Over 50% of times was spent counseling and coordination of care regarding coping skills, medication and discharge planning.

## 2024-07-05 NOTE — PROGRESS NOTES
Combination PRN Medication Administration    Medications Administered: Benadryl+Haldol+Ativan combination    What patient symptom(s) led to the administration of these medications?  Pt is very restless. Requesting prn medication for agitation. Pt put large water glass in his pants to use as a urinal while in the lounge. Disorganized.      What interventions were attempted to avoid use of these medication (including other PRN medications)?   Pt continues on SIO. Pt given redirection. Scheduled medications given at 7 am. Pt is requesting prn medication for restlessness and agitation.     What were the patient's response(s) to the interventions?   Will continue to assess . Pt observed to be resting in bed @ 1015. Medication given at 0940.    Provider notified: DR arauz        Date: 07/05/24        Time: 0930.  Discussed with  during team meeting and instructed to give medication.    KEVIN JOHNSON RN

## 2024-07-05 NOTE — PLAN OF CARE
"Goal Outcome Evaluation:    Patient was observed pacing in and out of his room pacing the hallways the entire shift while listening to music on headphones. Pt continues on SIO 1:1 this shift and required multiple redirections. Pt presented as anxious, loud hypermanic with pressured speech and flight of ideas, tense, agitated, paranoid, labile and disorganized this shift. Pt attended group this shift briefly. Pt denied SI/SIB/AVH/HI but endorsed some anxiety and depression. Pt was observed disrobing this shift and running in the hallways with no shirt on and refusing redirections. Pt reports \" I don't know how to function and act being a normal person, I sometimes like being manic. Pt continued saying \" I have a demon in me that can't leave me alone and can't get out of me.\" Pt was observed laying down on the flow and rolling himself on the floor. 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 Pt was medication compliant this shift and no side effects reported. Pt reported appetite and sleep as adequate. Pt appears clean but unkempt and showering this shift. Staff will continue to monitor and support with current POC    Plan of Care Reviewed With: patient        "

## 2024-07-06 PROCEDURE — 250N000013 HC RX MED GY IP 250 OP 250 PS 637: Performed by: CLINICAL NURSE SPECIALIST

## 2024-07-06 PROCEDURE — 250N000013 HC RX MED GY IP 250 OP 250 PS 637: Performed by: PSYCHIATRY & NEUROLOGY

## 2024-07-06 PROCEDURE — 124N000002 HC R&B MH UMMC

## 2024-07-06 RX ADMIN — LORAZEPAM 2 MG: 2 TABLET ORAL at 16:04

## 2024-07-06 RX ADMIN — OLANZAPINE 10 MG: 10 TABLET, FILM COATED ORAL at 02:16

## 2024-07-06 RX ADMIN — Medication 50 MCG: at 00:29

## 2024-07-06 RX ADMIN — NICOTINE POLACRILEX 4 MG: 2 LOZENGE ORAL at 10:21

## 2024-07-06 RX ADMIN — DIPHENHYDRAMINE HYDROCHLORIDE 50 MG: 50 CAPSULE ORAL at 16:04

## 2024-07-06 RX ADMIN — GABAPENTIN 400 MG: 400 CAPSULE ORAL at 14:09

## 2024-07-06 RX ADMIN — GABAPENTIN 400 MG: 400 CAPSULE ORAL at 00:31

## 2024-07-06 RX ADMIN — HALOPERIDOL 5 MG: 5 TABLET ORAL at 16:04

## 2024-07-06 RX ADMIN — NICOTINE 1 PATCH: 21 PATCH, EXTENDED RELEASE TRANSDERMAL at 08:19

## 2024-07-06 RX ADMIN — GABAPENTIN 400 MG: 400 CAPSULE ORAL at 08:12

## 2024-07-06 RX ADMIN — OLANZAPINE 12.5 MG: 10 TABLET, ORALLY DISINTEGRATING ORAL at 18:43

## 2024-07-06 RX ADMIN — OLANZAPINE 12.5 MG: 10 TABLET, ORALLY DISINTEGRATING ORAL at 08:12

## 2024-07-06 ASSESSMENT — ACTIVITIES OF DAILY LIVING (ADL)
ADLS_ACUITY_SCORE: 40
ADLS_ACUITY_SCORE: 40
ADLS_ACUITY_SCORE: 41
DRESS: SCRUBS (BEHAVIORAL HEALTH);INDEPENDENT
ADLS_ACUITY_SCORE: 41
ADLS_ACUITY_SCORE: 40
ADLS_ACUITY_SCORE: 40
HYGIENE/GROOMING: HANDWASHING;SHOWER;INDEPENDENT
ADLS_ACUITY_SCORE: 41
ORAL_HYGIENE: INDEPENDENT
ADLS_ACUITY_SCORE: 40
ADLS_ACUITY_SCORE: 41
ADLS_ACUITY_SCORE: 40
LAUNDRY: UNABLE TO COMPLETE
ADLS_ACUITY_SCORE: 41
ADLS_ACUITY_SCORE: 41
ADLS_ACUITY_SCORE: 40
ADLS_ACUITY_SCORE: 41
ADLS_ACUITY_SCORE: 40
ADLS_ACUITY_SCORE: 41
ADLS_ACUITY_SCORE: 40
ADLS_ACUITY_SCORE: 41
ADLS_ACUITY_SCORE: 40

## 2024-07-06 NOTE — PLAN OF CARE
Goal Outcome Evaluation:    Plan of Care Reviewed With: patient       Pt was very restless demanding with periods of angry out bursts this morning after breakfast.  Pt was demanding to be discharged so he could join the [employer]. Pt became intrusive when other female patient became angry and demanding. Pt escalated with her. Pt needed to be directed away from situation. Pt complied reluctantly. Pt showered this morning and was yelling out much of the time.  Pt was medication compliant this morning. Pt napped repeatedly through the shift and slept for more than 1/2 of shift. Pt had argument with mother over the phone and became very angry with her stating that he hated her and didn't want to see her again. Pt reported he had difficulty starting his urine this morning. Pt stated he has had this trouble for the past year. Pt reported he did have a large void before lunch and after lunch. Pt did place a large water glass in his pants to use glass as a urinal mid morning today but was redirected. Pt denied hearing voices. Pt is very restless when out of bed.

## 2024-07-06 NOTE — PROGRESS NOTES
0215:  Serafin approached this writer and c/o not being able to return to sleep.  Appeared mildly  agitated and manic.  Requested and given zyprexa 10mg @ 0215. Remain behaviorally controlled. Returned back to room and went to bed;

## 2024-07-06 NOTE — PLAN OF CARE
"Goal Outcome Evaluation:    Pt spent most of the shift in his room resting in bed but was observed out in the lounge area for meals, snacks and medications. Pt continues to present as disorganized and hypermanic, loud with flight of ideas. Pt continues on SIO 1:1 this shift intrusive and disorganized behaviors. Pt continues to require multiple redirections this shift from SIO staff. Pt was observed to be intrusive this shift by being seen attempting to open other Pt's rooms. Pt requested and received PRN for agitation. Pt was presented with [facility] paperwork this shift and voiced understanding of the process and voiced no concerns Pt reported high anxiety this shift but denied depression. Pt reported \" my mood hyper active upon check in. Pt denied SI/SIB/HI/AVH this shift. Pt has been seeping right after dinner this shift. Pt reported appetite and sleep as adequate. Staff will continue to monitor and support with current POC.     PRNs: Nicotine gum and Zyprexa      Plan of Care Reviewed With: patient            "

## 2024-07-06 NOTE — PROVIDER NOTIFICATION
"   07/06/24 1647   Upon Initiation   Order Obtained Yes   Seclusion or Restraint Order Upon Initiation and With Every Order   Length of Order 4   Attending Provider Notified Yes   Attending Provider's Name Marisa   Is the Attending Provider the Ordering Provider? Yes   Assessment at the Start of Every New Order   Less Restrictive Alternative Decreased stimulation;Verbal de-escalation;Medication administration;Reality orientation;Reassurance / Support   Risk Factors O   Justification at the Start of Every New Order   Clinical Justification All   Education at the Start of Every New Order   Discontinuation Criteria Absence of behavior that precipitated seclusion or restraint   Criteria Explained Yes   Patient's Response VU   Restraint Monitoring Q15 Minutes   Observed Behavior YE;PO;PA;O  (Pucnhing sealing tale)       Patient was ramping earlier starting from the start of the evening shift making demands and engaging disruptive behaviors. Patient was also instigating another female peer to involve her inappropriate behaviors; telling her \"I love you\" and trying to hold hands with her. He was provided verbal redirections as well as distractions. Appropriate boundaries encouraged and facilitated by offering engagement. Patient was instructed to go his room to provide less stimulating environment, but he declined. Writer's attention was called on upon patient crawling on the floor screaming and yelling. Patient was screaming with \"I can't breath... I need a fresh air.\" When asked to clarify and offered VS assessment, patient declined and began threatening the writer and his SIO staff. He told the writer \"to get the f*ck away from me before I punch one of you.\" Patient was provided space and he continue crawling; Patient became more agitated and began climbing on chairs and jumping on dinner tables. Patient then began punching the sealing tales and broke couple of then. Patient then attempted to climb through the " "sealing in attempt to \"get fresh air\" because \" I was here too long. Patient was verbally redirected and he was instructed to climb down from the dinner table and allow staff to check his vital signs; he partially complied, but not able to obtain appreciable value. Patient continue to ramp up. Code team arrived and patient went to his room. While in his room and staff continuing de-escalation, patient took his blanket and wrapped around his neck; he was told to take it off and he did. Patient at that moment was unable to self-regulate and causing more disruption and self-harm, punching around the doors. Seclusion is a viable option as patient exhausted other means to regulate. Understandably, the more dysregulated the patient is the closer he gets to harm himself or others. Patient was placed in seclusion. Patient was able to walk to seclusion room with no issue. Seclusion is warranted at this time for the safety of patient and others.   "

## 2024-07-06 NOTE — CARE PLAN
"   07/06/24 4445   Seclusion or Restraint Order Upon Initiation and With Every Order   Attending Provider Notified Yes   Attending Provider's Name Marisa   In Person Face to Face Assessment Conducted Yes-Eval of pt's immediate situation, reaction to intervention, complete review of systems assessment, behavioral assessment & review/assessment of hx, drugs & meds, recent labs, etc, behavioral condition, need to continue/terminate restraint/seclusion   RN Notified Provider of Result of Face to Face Yes   Describe adverse physical outcome None \"I'm in perfect health\"   Describe actions taken None needed   Describe follow-up needed None at this time     "

## 2024-07-06 NOTE — PLAN OF CARE
Goal Outcome Evaluation:    Patient asleep at start of the shift. Breathing quiet and unlabored when sleeping. Patient woke up at around 0030 and came to nurse station. Requested HS scheduled medications which he did not take because he was sleeping. Received gabapentin 400 mg and vitamin D3 50 mcg. Pt refused Zyprexa stating that he is so tired and sleepy but he came later at 0216 requested and received it.Complained of generalized pain which he rated as 7/10. Requested PRN tylenol but then declined stating that I will take it later. Pt returned back to bed and was observed sleeping during 15 minutes safety rounds check. He woke up again at 0500 and requested snacks. He was offered cereal and peanut butter [place]. Appears to have slept 5.75 hours. Pt continues on 1:1 SIO 10 ft.

## 2024-07-06 NOTE — PROVIDER NOTIFICATION
"   07/06/24 1647   Seclusion or Restraint Order Upon Initiation and With Every Order   In Person Face to Face Assessment Conducted Yes-Eval of pt's immediate situation, reaction to intervention, complete review of systems assessment, behavioral assessment & review/assessment of hx, drugs & meds, recent labs, etc, behavioral condition, need to continue/terminate restraint/seclusion   RN Notified Provider of Result of Face to Face Yes       Patient in seclusion due to risk of physically harming self and others where non-physical interventions have not been effective.  Patient experienced no adverse physical sequelae from being placed in seclusion. Pt says, \"I am in perfect physical health.\"  Psychologically, pt was verbally aggressive, destroying property and trying to crawl in the ceiling.  Chart reviewed. Labs reviewed. Aggressive behaviors do not appear to be related to chemical substance withdrawal.   NP notified of results of Face-to-Face assessment. No further orders at this time.     I spoke with Jeremias and requested pt transfer to station 12. She stated that I should contact intake. I contacted intake and they stated we should contact [facility]. At this time, [facility] is aware of requested transfer and we are awaiting a response.   "

## 2024-07-07 PROCEDURE — 250N000013 HC RX MED GY IP 250 OP 250 PS 637: Performed by: PSYCHIATRY & NEUROLOGY

## 2024-07-07 PROCEDURE — 250N000013 HC RX MED GY IP 250 OP 250 PS 637: Performed by: CLINICAL NURSE SPECIALIST

## 2024-07-07 PROCEDURE — 250N000013 HC RX MED GY IP 250 OP 250 PS 637: Performed by: EMERGENCY MEDICINE

## 2024-07-07 PROCEDURE — 124N000002 HC R&B MH UMMC

## 2024-07-07 RX ADMIN — NICOTINE POLACRILEX 4 MG: 2 LOZENGE ORAL at 10:00

## 2024-07-07 RX ADMIN — NICOTINE POLACRILEX 4 MG: 2 LOZENGE ORAL at 08:41

## 2024-07-07 RX ADMIN — HYDROXYZINE HYDROCHLORIDE 50 MG: 50 TABLET, FILM COATED ORAL at 00:48

## 2024-07-07 RX ADMIN — METHOCARBAMOL 500 MG: 500 TABLET ORAL at 13:18

## 2024-07-07 RX ADMIN — NICOTINE POLACRILEX 4 MG: 2 LOZENGE ORAL at 11:40

## 2024-07-07 RX ADMIN — TRAZODONE HYDROCHLORIDE 50 MG: 50 TABLET ORAL at 00:48

## 2024-07-07 RX ADMIN — ACETAMINOPHEN 650 MG: 325 TABLET, FILM COATED ORAL at 17:27

## 2024-07-07 RX ADMIN — GABAPENTIN 400 MG: 400 CAPSULE ORAL at 20:17

## 2024-07-07 RX ADMIN — OLANZAPINE 12.5 MG: 10 TABLET, ORALLY DISINTEGRATING ORAL at 08:40

## 2024-07-07 RX ADMIN — GABAPENTIN 400 MG: 400 CAPSULE ORAL at 08:40

## 2024-07-07 RX ADMIN — NICOTINE POLACRILEX 4 MG: 2 LOZENGE ORAL at 17:28

## 2024-07-07 RX ADMIN — Medication 50 MCG: at 20:18

## 2024-07-07 RX ADMIN — NICOTINE POLACRILEX 4 MG: 2 LOZENGE ORAL at 12:56

## 2024-07-07 RX ADMIN — HYDROXYZINE HYDROCHLORIDE 50 MG: 50 TABLET, FILM COATED ORAL at 17:27

## 2024-07-07 RX ADMIN — OLANZAPINE 10 MG: 10 TABLET, FILM COATED ORAL at 13:59

## 2024-07-07 RX ADMIN — GABAPENTIN 400 MG: 400 CAPSULE ORAL at 13:57

## 2024-07-07 RX ADMIN — NICOTINE POLACRILEX 4 MG: 2 LOZENGE ORAL at 14:18

## 2024-07-07 RX ADMIN — NICOTINE 1 PATCH: 21 PATCH, EXTENDED RELEASE TRANSDERMAL at 08:42

## 2024-07-07 RX ADMIN — TRAZODONE HYDROCHLORIDE 50 MG: 50 TABLET ORAL at 20:17

## 2024-07-07 RX ADMIN — OLANZAPINE 12.5 MG: 10 TABLET, ORALLY DISINTEGRATING ORAL at 20:17

## 2024-07-07 RX ADMIN — ACETAMINOPHEN 650 MG: 325 TABLET, FILM COATED ORAL at 13:58

## 2024-07-07 ASSESSMENT — ACTIVITIES OF DAILY LIVING (ADL)
ORAL_HYGIENE: INDEPENDENT
ADLS_ACUITY_SCORE: 40
LAUNDRY: WITH SUPERVISION
ADLS_ACUITY_SCORE: 40
HYGIENE/GROOMING: HANDWASHING;SHOWER;INDEPENDENT
ADLS_ACUITY_SCORE: 40
LAUNDRY: UNABLE TO COMPLETE
ADLS_ACUITY_SCORE: 40
HYGIENE/GROOMING: INDEPENDENT
ADLS_ACUITY_SCORE: 40
ORAL_HYGIENE: INDEPENDENT
ADLS_ACUITY_SCORE: 40
DRESS: SCRUBS (BEHAVIORAL HEALTH);INDEPENDENT
ADLS_ACUITY_SCORE: 40
DRESS: INDEPENDENT
ADLS_ACUITY_SCORE: 40
ADLS_ACUITY_SCORE: 40

## 2024-07-07 NOTE — PLAN OF CARE
Goal Outcome Evaluation:    Plan of Care Reviewed With: patient    Pt spent more than 50% of the shift in common areas of the unit.  Pt is restless, hyper verbal. Pt continues on SIO 2:1 for severe intrusive behavior and aggression. Pt had no angry out bursts this shift pt was medication compliant . Pt frequently seeking out staff for requests of food and prn medications.  Pt is hyper verbal. Has flight of ideas. Pt c/o that his thoughts are going too fast and he can't sleep.  Pt is seeking medication for sleep. Pt c/o of difficulty starting his urine this morning. Pt utilized [employer] water sounds and this was successful. Pt again states that he has difficulty starting his urine over the past year or more. Pt is drinking liberal amounts of fluids and reports voiding ok. Pt asked to have his court paper work explained. When staff attempted to assist pt with this he focussed on word FINDING and stated that means he gets money. And when staff tried to explain again he stated that they did not know anything and to go away. Pt reported that his thoughts are going too fast and it keeps him awake. Pt has responded very well to humor. Pt received zyprexa 10 mg for increased restlessness and agitation.

## 2024-07-07 NOTE — PLAN OF CARE
NOC Shift Report    Pt in bed at beginning of shift, breathing quiet and unlabored. Pt slept through shift. Pt slept 6.5 hours.     No pt complaints or concerns at this time.     PRNs given. See MAR. Will continue to monitor.     Precautions: 2:1 SIO for assault and aggression.

## 2024-07-07 NOTE — PROVIDER NOTIFICATION
07/06/24 1850   Restraint Type Q15 Minutes   Seclusion Discontinued   Debrief Immediately Before or After Removal   Debriefing DO   Does patient understand why the event happened? Yes   Does patient agree to safe behaviors? Yes   What can we do differently so this doesn't happen again? Other (comment)  (Please see note)   Plan of care reviewed and modified Yes       Patient met discontinuation criteria. Patient agrees to safe behaviors. Patient accepted his schedule oral Zyprexa and agree to remain in his room. Patient agree to maintain appropriate boundaries with unit peers/staff. Patient is no longer a danger to self/others. Discontinuation of seclusion is appropriate.     Olimpia Kyle, RN

## 2024-07-07 NOTE — PLAN OF CARE
Goal Outcome Evaluation:    [facility] and listening to music on headphones. Pt continues to present as restless, disorganized, loud, pressured speech. Pt continues on 2:1 this shift for assault and intrusive behaviors. Pt was redirectable this shift. Pt denied SI/SIB/HI/AVH as well as anxiety and depression. Pt compliant of LLE pain and requested PRN. Pt was medication compliant and denied any side effects. Pt appears unkempt this shift. Pt reported appetite and sleep as adequate. Staff will continue to monitor and support with current POC.    Plan of Care Reviewed With: patient

## 2024-07-07 NOTE — PLAN OF CARE
Problem: Psychotic Signs/Symptoms  Goal: Improved Behavioral Control (Psychotic Signs/Symptoms)  Intervention: Manage Behavior  De-Escalation Techniques:   2:1 observation initiated   appropriate behavior reinforced   diversional activity encouraged   medication administered    Alertness: Alert and orientedx3  Affect: labile, tense  Mood: irritable, agitated, angry  Appearance: adequately groomed  Thought Process: illogical; tangential  Thought Content: Denies auditory hallucinations, SI/SIB/HI,  Delusions: Grandiose  Anxiety & Depression: Denies  Skin: Abrasions on right hand knuckles  Bowel/bladder: No issue reported this shift.   Appetite: Good; ate 100% of dinner tray  Medications: Medication compliant  PRN medications this shift:  Last 24H PRN:     haloperidol (HALDOL) tablet 5 mg, 5 mg at 07/06/24 1604 **AND** LORazepam (ATIVAN) tablet 2 mg, 2 mg at 07/06/24 1604 **AND** diphenhydrAMINE (BENADRYL) capsule 50 mg, 50 mg at 07/06/24 1604    nicotine (COMMIT) lozenge 4 mg, 4 mg at 07/06/24 1021    OLANZapine (zyPREXA) tablet 10 mg, 10 mg at 07/06/24 0216 **OR** OLANZapine (zyPREXA) injection 10 mg    New Provider orders: SIO order modified to 2:1     Milieu Participation: Patient was briefly present in the milieu and became disruptive shortly after waking up from nap. Patient's behavioral dysregulation remain escalating despite staff's efforts to de-escalate. Patient needed to be stabilized and regulated so he was placed in seclusion from 16:47-18:50 (please read seclusion note for more details). After the discontinuation of the seclusion, patient agreed to follow safety expectations. Patient went his room, ate dinner and took evening schedule Zyprexa with no issue. Patient went to bed and remained in bed to the of writer's shift. No further behavioral concern noted.     Patient is eating, hydrating, and sleeping adequately. Patient is medication compliant with reminders. Medication side effects were not observed  "or reported this shift. Patient denies pain/ physical discomforts. Patient reports abrasions on his right hand knuckles; no swelling or redness noted. Bone structures appears intact. Patient went to bed and unable to further assess patient's hand.No acute medical concern. VS WNL /86 (BP Location: Right arm, Patient Position: Sitting, Cuff Size: Adult Regular)   Pulse 103   Temp 98.1  F (36.7  C) (Oral)   Resp 16   Ht 1.88 m (6' 2\")   Wt 78.9 kg (173 lb 14.4 oz)   SpO2 100%   BMI 22.33 kg/m                     "

## 2024-07-08 PROCEDURE — 250N000013 HC RX MED GY IP 250 OP 250 PS 637: Performed by: PSYCHIATRY & NEUROLOGY

## 2024-07-08 PROCEDURE — 250N000013 HC RX MED GY IP 250 OP 250 PS 637: Performed by: CLINICAL NURSE SPECIALIST

## 2024-07-08 PROCEDURE — 124N000002 HC R&B MH UMMC

## 2024-07-08 PROCEDURE — 99232 SBSQ HOSP IP/OBS MODERATE 35: CPT | Performed by: PSYCHIATRY & NEUROLOGY

## 2024-07-08 RX ADMIN — DIPHENHYDRAMINE HYDROCHLORIDE 50 MG: 50 CAPSULE ORAL at 11:53

## 2024-07-08 RX ADMIN — NICOTINE POLACRILEX 4 MG: 2 LOZENGE ORAL at 08:08

## 2024-07-08 RX ADMIN — OLANZAPINE 12.5 MG: 10 TABLET, ORALLY DISINTEGRATING ORAL at 08:08

## 2024-07-08 RX ADMIN — HALOPERIDOL 5 MG: 5 TABLET ORAL at 11:53

## 2024-07-08 RX ADMIN — NICOTINE 1 PATCH: 21 PATCH, EXTENDED RELEASE TRANSDERMAL at 08:09

## 2024-07-08 RX ADMIN — ACETAMINOPHEN 650 MG: 325 TABLET, FILM COATED ORAL at 09:50

## 2024-07-08 RX ADMIN — OLANZAPINE 15 MG: 10 TABLET, ORALLY DISINTEGRATING ORAL at 18:25

## 2024-07-08 RX ADMIN — TRAZODONE HYDROCHLORIDE 50 MG: 50 TABLET ORAL at 00:40

## 2024-07-08 RX ADMIN — NICOTINE POLACRILEX 2 MG: 2 LOZENGE ORAL at 18:26

## 2024-07-08 RX ADMIN — GABAPENTIN 400 MG: 400 CAPSULE ORAL at 08:08

## 2024-07-08 RX ADMIN — GABAPENTIN 400 MG: 400 CAPSULE ORAL at 16:17

## 2024-07-08 RX ADMIN — Medication 50 MCG: at 18:23

## 2024-07-08 ASSESSMENT — ACTIVITIES OF DAILY LIVING (ADL)
ADLS_ACUITY_SCORE: 40
LAUNDRY: UNABLE TO COMPLETE
HYGIENE/GROOMING: HANDWASHING;SHOWER;INDEPENDENT
ADLS_ACUITY_SCORE: 40
DRESS: SCRUBS (BEHAVIORAL HEALTH);INDEPENDENT
ADLS_ACUITY_SCORE: 40
DRESS: SCRUBS (BEHAVIORAL HEALTH);INDEPENDENT
ADLS_ACUITY_SCORE: 40
LAUNDRY: UNABLE TO COMPLETE
HYGIENE/GROOMING: INDEPENDENT
ADLS_ACUITY_SCORE: 40
ORAL_HYGIENE: INDEPENDENT
ADLS_ACUITY_SCORE: 40
ORAL_HYGIENE: INDEPENDENT
ADLS_ACUITY_SCORE: 40
ADLS_ACUITY_SCORE: 40

## 2024-07-08 NOTE — PLAN OF CARE
"Goal Outcome Evaluation:    Pt came into group, he said he was very tired . Writer asked if he wanted to do assigned worksheet and he said he would just like to observe. He said he is only in the group to get credit for group and he was so tired, he would like to leave. Writer invited him to take a nap and try group another time but that he wouldn't get \" group credit\" for being in group for 2-3 minutes.                        "

## 2024-07-08 NOTE — PLAN OF CARE
Goal Outcome Evaluation:    Plan of Care Reviewed With: patient    Pt spent most of the morning in common areas of the unit. Pt is hyper verbal. Pressured speech. Pt is very restless and at times skipping circles in the lounge. Pt appears euphoric and happy at times angry and hostile at others. Pt expressed much anger towards his Dr this morning after visit. Pt received janet papers today before lunch. Writer exam and court dates. Pt accepted this with out acute escalation.  Pt requested and received prn haldol 5 mg and benadryl 50 mg for agitation psychosis. Pt c/o of thinking too fast. Pt observed to sleep/ nap on and off after lunch. Pt denies any suicidal thoughts. Pt denies hearing any voices. Pt reported that he had some trouble starting his urine this morning, but reported voiding on awakening this morning and multiple voids through the shift.

## 2024-07-08 NOTE — PLAN OF CARE
Team Note Due:  Friday    Assessment/Intervention/Current Symtoms and Care Coordination:  Chart review and met with team, discussed pt progress, symptomology, and response to treatment.  Discussed the discharge plan and any potential impediments to discharge.    Pt continues with agitation and disorganization.  He is now on a 2:1 male only.     brought court paperwork today. RN and I went to his room. RN went over the dates.  PT is having a hard time understanding the information and continues o focus on leaving.    I checked in with FC as pt needs medical assistance. She called the unit today and as told he was not clinically stable enough to talk to her.       Diagnostic assessment completed for [facility] by coverage worker.      Discharge Plan or Goal:  IRTS       Barriers to Discharge:  Pt is ill and does not have providers     Referral Status:  Too early  Pt has no established providers       Medical Assistance  24 - FC called unit to talk to pt about applying for MA but was told he was too clinically stable      Legal Status:  Court hold as of 3:51 on 24      [facility]: [facility]  File Number: [id number]  Start and expiration date of commitment:       Exam: 7/10 @ 9AM  Preliminary Hearing to follow.  Final Hearin/15 @ TBD    Contacts:      HUSSEIN LAZO for aunt.Aunt:Nita @ [phone])  Father:Subhash @ [phone]        Upcoming Meetings and Dates/Important Information and next steps:      Waiting for court paperwork to be delivered with hearing dates.

## 2024-07-08 NOTE — PROGRESS NOTES
"[facility], [place]   Psychiatric Progress Note        Interim History:     The patient's care was discussed with the treatment team during the daily team meeting and/or staff's chart notes were reviewed.  Staff report patient had a quite busy weekend. He was periodically agitated, was reported to try to climb dining table and went after female peer who just got admitted to this floor. Needed constant redirections and offering prn meds. SIO over weekend was increased to 2:1 male only.    Met with patient: patient was seen first in the conference room in presence of 2 SIO staff, then, in the common area. During our first visit Serafin started with asking to tell him first: \"the bad news, then, good news\". When asked to explain, he again told me that he would like to start communication with me \"from the clean slate\" and hoped to hear good news that he was about to be discharged. His speech was pressured, he was very difficult to interrupt. When I reminded him that on Friday he was given his copy of the court hold and that meant that he could not be discharged and had to stay at this facility, Serafin stood up and said that he had nothing else to talk to me about. I approached him again shortly after our meeting: he was sitting in group and agreed to walk out and talk to me again. During our 2nd meeting Serafin started with speaking that he felt that his life was endangered by \"some people from outside\". He went on to tell me and 2 SIO staff that those people were trying to set him up and put dope into his car. 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 He clearly had very little patience for talking and quickly stood up and left. He then, approached RN and said that he would like to have a 2nd opinion from a female provider.          Medications:     Current Facility-Administered " "Medications   Medication Dose Route Frequency Provider Last Rate Last Admin    gabapentin (NEURONTIN) capsule 400 mg  400 mg Oral TID Jese Arizmendi MD   400 mg at 07/08/24 0808    nicotine (NICODERM CQ) 21 MG/24HR 24 hr patch 1 patch  1 patch Transdermal Daily Jese Arizmendi MD   1 patch at 07/08/24 0809    OLANZapine zydis (zyPREXA) ODT half-tab 12.5 mg  12.5 mg Oral BID Jese Arizmendi MD   12.5 mg at 07/08/24 0808    Vitamin D3 (CHOLECALCIFEROL) tablet 50 mcg  50 mcg Oral At Bedtime Maria Pennington APRN CNP   50 mcg at 07/07/24 2018          Allergies:     Allergies   Allergen Reactions    Paliperidone Other (See Comments)     Other reaction(s): Extrapyramidal Symptoms   Significant EPS    Significant EPS    Pork Allergy Other (See Comments)     Buddhism prohibition          Labs:   No results found for this or any previous visit (from the past 24 hour(s)).       Psychiatric Examination:     /69 (BP Location: Right arm)   Pulse 96   Temp 98.2  F (36.8  C) (Oral)   Resp 16   Ht 1.88 m (6' 2\")   Wt 80.3 kg (177 lb)   SpO2 100%   BMI 22.73 kg/m    Weight is 177 lbs 0 oz  Body mass index is 22.73 kg/m .    Orthostatic Vitals         Most Recent      Sitting Orthostatic /69 07/08 0755    Sitting Orthostatic Pulse (bpm) 96 07/08 0755    Standing Orthostatic /85 07/08 0755    Standing Orthostatic Pulse (bpm) 115 07/08 0755           Appearance: dressed in hospital scrubs  Attitude:  minimally cooperative  Eye Contact:  tense  Mood:  labile, irritable  Affect:  intensity is exaggerated and intensity is dramatic  Speech:  pressured speech  Psychomotor Behavior:  no evidence of tardive dyskinesia, dystonia, or tics and physical agitation  Throught Process: disorganized and illogical  Associations:  loosening of associations present  Thought Content:  grandiose delusions  Insight:  limited  Judgement:  poor  Oriented to:  time, person, and place  Attention Span and " Concentration:  poor  Recent and Remote Memory:  poor    Clinical Global Impressions    First: 7/4 7/1/2024      Most recent:            Precautions:     Behavioral Orders   Procedures    Assault precautions    Code 1 - Restrict to Unit    Elopement precautions    Fall precautions    Routine Programming     As clinically indicated    Status 15     Every 15 minutes.    Status Individual Observation     Patient SIO 2:1 status reviewed with team/RN.  Please also refer to RN/team documentation for add'l detail.  2:1     -SIO staff to monitor following which have contributed to patient being on SIO:  Pt was observed pushing on security staff in the ER with intent of leaving the hospital.  -Possible interventions SIO staff could use to support patient's treatment progress:  Offer redirections   -When following observed, team will review discontinuation of SIO:  Able to sign in voluntary and contract for safety     Order Specific Question:   CONTINUOUS 24 hours / day     Answer:   Other     Order Specific Question:   Specify distance     Answer:   10     Order Specific Question:   Indications for SIO     Answer:   Assault risk     Order Specific Question:   Indications for SIO     Answer:   Severe intrusiveness    Suicide precautions: Suicide Risk: HIGH; Clinical rationale to override score: modification to the care environment     Patients on Suicide Precautions should have a Combination Diet ordered that includes a Diet selection(s) AND a Behavioral Tray selection for Safe Tray - with utensils, or Safe Tray - NO utensils       Order Specific Question:   Suicide Risk     Answer:   HIGH     Order Specific Question:   Clinical rationale to override score:     Answer:   modification to the care environment          DIagnoses:     Bipolar affective disorder, candie vs Schizoaffective disorder, bipolar type, manic.  Historical dx of stimulants use disorder.         Plan:     Per [facility], as of now patient is still on back to back  72 hour hold due to transferring his case from [place] to [facility]. Patient is on a court hold, we are waiting for a  to serve him. Will increase Zyprexa dose. Will continue with SIOx2 and No roommate. Will continue to provide support and structure and encourage taking scheduled Zyprexa.     Total time spent was 39 minutes. Over 50% of times was spent counseling and coordination of care regarding coping skills, medication and discharge planning.         normal shape/ROM intact/strength intact/normal

## 2024-07-08 NOTE — PLAN OF CARE
Goal Outcome Evaluation:    Patient asleep at start of the shift. Breathing quiet and unlabored when sleeping. Pt woke up at around 0030 requested and given PRN trazodone for sleep, cereals and milk. Pt returned back to the and laid down. Pt woke up a gain at 0430, requested snacks; toast, sun butter, cereals and milk. Pt had no c/o pain or discomfort during the HS. Appears to have slept 5.75 hours. Pt continues on 2:1 SIO 10 ft. For assault and aggression.

## 2024-07-08 NOTE — PROGRESS NOTES
Rehab Group    Start time: 1015  End time: 1115  Patient time total: 30 minutes (no charge)    attended partial group    #7 attended   Group Type: occupational therapy   Group Topic Covered: coping skills     Group Session Detail:  OT clinic     Patient Response/Contribution:  cooperative with task     Patient Detail:    Pt actively participated in occupational therapy clinic to facilitate coping skill exploration, creative expression within personally meaningful activities, and clinical observation of social, cognitive, and kinesthetic performance skills. Pt response: Upon arrival to group, he requested a simplified task and declined continuing his in-progress project. Minimal assistance needed to initiate, gather materials, sequence, and adjust to workspace demands as needed. Demonstrated limited focus, planning, and attention to detail for selected structured creative expression task. Appeared disorganized. Able to ask for assistance as needed, and primarily socialized with his SIO staff. He briefly attended the second morning group with a focus on coping through movement to facilitate relaxation and stress management via chair yoga. Intermittently followed the demonstrated movement, though appeared increasingly restless and distractible as group progressed. Left both morning groups early (no charge).        No Charge    Patient Active Problem List   Diagnosis    Anxiety    Adjustment disorder with anxious mood    Polysubstance abuse (H)    Manic behavior (H)    Psychosis (H)    Bipolar affective disorder, current episode manic (H)    Schizoaffective disorder, bipolar type (H)    Suicidal ideation    Agitation    Itzel (H)

## 2024-07-08 NOTE — PLAN OF CARE
BEH IP Unit Acuity Rating Score (UARS)  Patient is given one point for every criteria they meet.    CRITERIA SCORING   On a 72 hour hold, court hold, committed, stay of commitment, or revocation. 1    Patient LOS on BEH unit exceeds 20 days. 0  LOS: 11   Patient under guardianship, 55+, otherwise medically complex, or under age 11. 0   Suicide ideation without relief of precipitating factors. 0   Current plan for suicide. 0   Current plan for homicide. 0   Imminent risk or actual attempt to seriously harm another without relief of factors precipitating the attempt. 0   Severe dysfunction in daily living (ex: complete neglect for self care, extreme disruption in vegetative function, extreme deterioration in social interactions). 1   Recent (last 7 days) or current physical aggression in the ED or on unit. 0   Restraints or seclusion episode in past 72 hours. 0   Recent (last 7 days) or current verbal aggression, agitation, yelling, etc., while in the ED or unit. 0     Active psychosis. 1   Need for constant or near constant redirection (from leaving, from others, etc).  1   Intrusive or disruptive behaviors. 1   Patient requires 3 or more hours of individualized nursing care per 8-hour shift (i.e. for ADLs, meds, therapeutic interventions). 0   TOTAL 6

## 2024-07-09 PROCEDURE — 250N000013 HC RX MED GY IP 250 OP 250 PS 637: Performed by: EMERGENCY MEDICINE

## 2024-07-09 PROCEDURE — 250N000013 HC RX MED GY IP 250 OP 250 PS 637: Performed by: PSYCHIATRY & NEUROLOGY

## 2024-07-09 PROCEDURE — 250N000013 HC RX MED GY IP 250 OP 250 PS 637: Performed by: CLINICAL NURSE SPECIALIST

## 2024-07-09 PROCEDURE — 124N000002 HC R&B MH UMMC

## 2024-07-09 PROCEDURE — 99233 SBSQ HOSP IP/OBS HIGH 50: CPT | Performed by: REGISTERED NURSE

## 2024-07-09 RX ADMIN — TRAZODONE HYDROCHLORIDE 50 MG: 50 TABLET ORAL at 01:59

## 2024-07-09 RX ADMIN — GABAPENTIN 400 MG: 400 CAPSULE ORAL at 15:06

## 2024-07-09 RX ADMIN — GABAPENTIN 400 MG: 400 CAPSULE ORAL at 08:36

## 2024-07-09 RX ADMIN — Medication 50 MCG: at 08:36

## 2024-07-09 RX ADMIN — OLANZAPINE 15 MG: 10 TABLET, ORALLY DISINTEGRATING ORAL at 18:08

## 2024-07-09 RX ADMIN — TRAZODONE HYDROCHLORIDE 50 MG: 50 TABLET ORAL at 18:42

## 2024-07-09 RX ADMIN — METHOCARBAMOL 500 MG: 500 TABLET ORAL at 08:36

## 2024-07-09 RX ADMIN — GABAPENTIN 400 MG: 400 CAPSULE ORAL at 18:07

## 2024-07-09 RX ADMIN — HALOPERIDOL 5 MG: 5 TABLET ORAL at 19:35

## 2024-07-09 RX ADMIN — HYDROXYZINE HYDROCHLORIDE 50 MG: 50 TABLET, FILM COATED ORAL at 01:59

## 2024-07-09 RX ADMIN — ACETAMINOPHEN 650 MG: 325 TABLET, FILM COATED ORAL at 08:36

## 2024-07-09 RX ADMIN — OLANZAPINE 15 MG: 10 TABLET, ORALLY DISINTEGRATING ORAL at 08:36

## 2024-07-09 RX ADMIN — DIPHENHYDRAMINE HYDROCHLORIDE 50 MG: 50 CAPSULE ORAL at 19:35

## 2024-07-09 RX ADMIN — HYDROXYZINE HYDROCHLORIDE 50 MG: 50 TABLET, FILM COATED ORAL at 18:42

## 2024-07-09 RX ADMIN — NICOTINE POLACRILEX 4 MG: 2 LOZENGE ORAL at 19:19

## 2024-07-09 ASSESSMENT — ACTIVITIES OF DAILY LIVING (ADL)
ADLS_ACUITY_SCORE: 40
DRESS: INDEPENDENT
ADLS_ACUITY_SCORE: 40
DRESS: INDEPENDENT
ADLS_ACUITY_SCORE: 40
ORAL_HYGIENE: INDEPENDENT
ADLS_ACUITY_SCORE: 40
LAUNDRY: WITH SUPERVISION
ADLS_ACUITY_SCORE: 40
HYGIENE/GROOMING: INDEPENDENT
ADLS_ACUITY_SCORE: 40
ADLS_ACUITY_SCORE: 40
ORAL_HYGIENE: INDEPENDENT
ADLS_ACUITY_SCORE: 40
HYGIENE/GROOMING: INDEPENDENT

## 2024-07-09 NOTE — PLAN OF CARE
"\"I am going to raise hell today!\"  \"I could just live here forever if you would just let me hit my vape. The people here are so caring.\"  \"I will be going into a deep depression if I dont get out of here soon.\"  \"YES! [facility] is working for me to get me out of here.\"   \"I just want my meds so I can go back to bed.\"    Pt statements above.     Lamine \"HARLEY\" was in and out of his room throughout the shift. Spent periods of time resting in bed.   Continues to present with pressured speech, labile mood.   He is animated and restless at times, pacing the unit with headphones on.   He showered this shift and was yelling in the shower, but he has not had any acute episodes of agitation or dysregulation this shift. He has been verbally redirectable.   He complied with all medication except Nicotine patch. He c/o nightmares as possible side effect as he was wearing his patch thru the night.   He denies psychotic symptoms, but continues to demonstrate delusional thoughts.   Pt met with Provider MAKENNA Mcdowell today for second opinion at his request.     He c/o pain of his BLE, specifically his left knee that he says is from an incident prior to admission. PRN Tylenol and Robaxin given per pt request. Pt reported minimal efficacy. A request was placed to Provider for prn Ibuprofen for pt to use as an alternative pain agent.     He continues on 2:1 SIO staff monitoring for assault risk/severe intrusiveness.     Notably tachycardic. HR ranged 113-139. /70   Pulse 113   Temp 97.3  F (36.3  C) (Oral)   Resp 18   Ht 1.88 m (6' 2\")   Wt 78.3 kg (172 lb 9.6 oz)   SpO2 100%   BMI 22.16 kg/m    Provider notified in Team meeting.       Problem: Adult Behavioral Health Plan of Care  Goal: Plan of Care Review  Outcome: Progressing  Flowsheets (Taken 7/9/2024 9379)  Patient Agreement with Plan of Care: agrees     Problem: Psychotic Signs/Symptoms  Goal: Improved Mood Symptoms (Psychotic " Signs/Symptoms)  Outcome: Progressing  Intervention: Optimize Emotion and Mood  Recent Flowsheet Documentation  Taken 7/9/2024 0835 by Brenda De La Torre, RN  Supportive Measures:   verbalization of feelings encouraged   relaxation techniques promoted   problem-solving facilitated   positive reinforcement provided   active listening utilized   goal-setting facilitated  Diversional Activity:   music   television     Problem: Suicide Risk  Goal: Absence of Self-Harm  Outcome: Progressing  Intervention: Assess Risk to Self and Maintain Safety  Recent Flowsheet Documentation  Taken 7/9/2024 0835 by Brenda De La Torre, RN  Behavior Management: boundaries reinforced  Self-Harm Prevention:   environmental self-harm risks assessed   environment modified for self-harm risk   observed one-to-one  Intervention: Promote Psychosocial Wellbeing  Recent Flowsheet Documentation  Taken 7/9/2024 0835 by Brenda De La Torre RN  Supportive Measures:   verbalization of feelings encouraged   relaxation techniques promoted   problem-solving facilitated   positive reinforcement provided   active listening utilized   goal-setting facilitated  Intervention: Establish Safety Plan and Continuity of Care  Recent Flowsheet Documentation  Taken 7/9/2024 0835 by Brenda De La Torre RN  Safe Transition Promotion: protective factors promoted   Goal Outcome Evaluation:    Plan of Care Reviewed With: patient

## 2024-07-09 NOTE — PLAN OF CARE
Team Note Due:  Friday    Assessment/Intervention/Current Symtoms and Care Coordination:  Chart review and met with team, discussed pt progress, symptomology, and response to treatment.  Discussed the discharge plan and any potential impediments to discharge.    I received a call from the  acknowledging the court hearing tomorrow.      Discharge Plan or Goal:  IRTS       Barriers to Discharge:  Pt is ill and does not have providers     Referral Status:  Too early  Pt has no established providers       Medical Assistance  24 -  called unit to talk to pt about applying for MA but was told he was too clinically stable      Legal Status:  Court hold as of 3:51 on 24      [facility]: [facility]  File Number: [id number]  Start and expiration date of commitment:       Exam: 7/10 @ 9AM  Preliminary Hearing to follow.  Final Hearin/15 @ TBD    Contacts:      HUSSEIN LAZO for aunt.Aunt:Nita @ [phone])  Father:Lynettelucila @ [phone]        Upcoming Meetings and Dates/Important Information and next steps:        Exam: 7/10 @ 9AM  Preliminary Hearing to follow.  Final Hearin/15 @ TBD

## 2024-07-09 NOTE — PROGRESS NOTES
"[facility] [place]   Psychiatric Progress Note    Hospital Day: 12  Interim History:   Writer requested to meet with patient today.  Patient requested a second opinion, as he does not agree with petition for MH commitment. The patient's care was discussed with the treatment team provider and staff's chart notes were reviewed.  Staff reported patient was intermittently visible in the milieu. He appeared restless and anxious.  He was unkempt with pressured and loud speech. He made statements about not needing to be in the hospital.  Patient was disruptive in the milieu.  In the lounge, he skipped in circles.  He lay down on the lounge floor and began rolling around.  Patient was adherent with scheduled neuroleptic.  He remained on a SIO 2:1-10 feet for assault risk and severe intrusiveness.  Per staff documentation, patient slept 6.25 hours last night.      Met with patient.  Upon interview, writer introduced self and explained role.  Patient requests that interview be short with as few questions as possible, as he would like to rest.  He requests that SIO staff be present in the room with provider during interview.  Patient stands up throughout the interview.  He appears restless with psychomotor agitation.  He states his auntie and mom \"set him up\".  He states mom and auntie agreed to help him find housing, and they brought him to the hospital instead.  Patient reports his auntie told him he was coming to the hospital to get sleep medications, but that was a lie.  Patient reports he was sleeping prior to admission.  He was sleeping \"3, 4, 5, 3\" hours.  Patient does admit he was not sleeping 8 hours nightly.  Patient states he was using cocaine prior to admission.  UDS was unremarkable.  Patient wishes he could receive treatment \"to make [him] forget cocaine\".  Patient states he would take cocaine now if he could.  He denies use of other substances prior to admission, including " "psilocybin, cannabis, or benzodiazepines.  Patient states he does not use \"mushrooms because my life is already a trip\".  He repeats this statement.  Patient does endorse previous cannabis use.  Patient is hyperverbal on exam with pressured and increased volume of speech.  He is disorganized, distractible, and impulsive.  He ends the interview abruptly, stating, \"I'm done.\"  Patient quickly exits the room and begins pacing in the hallway.           Medications:     Current Facility-Administered Medications   Medication Dose Route Frequency Provider Last Rate Last Admin    gabapentin (NEURONTIN) capsule 400 mg  400 mg Oral TID Jese Arizmendi MD   400 mg at 07/08/24 1617    nicotine (NICODERM CQ) 21 MG/24HR 24 hr patch 1 patch  1 patch Transdermal Daily Jese Arizmendi MD   1 patch at 07/08/24 0809    OLANZapine zydis (zyPREXA) ODT tab 15 mg  15 mg Oral BID Jese Arizmendi MD   15 mg at 07/08/24 1825    Vitamin D3 (CHOLECALCIFEROL) tablet 50 mcg  50 mcg Oral At Bedtime Maria Pennington APRN CNP   50 mcg at 07/08/24 1823       Allergies:     Allergies   Allergen Reactions    Paliperidone Other (See Comments)     Other reaction(s): Extrapyramidal Symptoms   Significant EPS    Significant EPS    Pork Allergy Other (See Comments)     Zoroastrianism prohibition       Labs:     Recent Results (from the past 672 hour(s))   Urine Drug Screen Panel    Collection Time: 06/21/24  8:50 AM   Result Value Ref Range    Amphetamines Urine Screen Negative Screen Negative    Barbituates Urine Screen Negative Screen Negative    Benzodiazepine Urine Screen Negative Screen Negative    Cannabinoids Urine Screen Negative Screen Negative    Cocaine Urine Screen Negative Screen Negative    Fentanyl Qual Urine Screen Negative Screen Negative    Opiates Urine Screen Negative Screen Negative    PCP Urine Screen Negative Screen Negative   Urine Drug Screen Panel    Collection Time: 06/22/24  8:31 PM   Result Value Ref Range    " Amphetamines Urine Screen Negative Screen Negative    Barbituates Urine Screen Negative Screen Negative    Benzodiazepine Urine Screen Negative Screen Negative    Cannabinoids Urine Screen Negative Screen Negative    Cocaine Urine Screen Negative Screen Negative    Fentanyl Qual Urine Screen Negative Screen Negative    Opiates Urine Screen Negative Screen Negative    PCP Urine Screen Negative Screen Negative   Urine Drug Screen Panel    Collection Time: 06/25/24 11:36 PM   Result Value Ref Range    Amphetamines Urine Screen Negative Screen Negative    Barbituates Urine Screen Negative Screen Negative    Benzodiazepine Urine Screen Negative Screen Negative    Cannabinoids Urine Screen Negative Screen Negative    Cocaine Urine Screen Negative Screen Negative    Fentanyl Qual Urine Screen Negative Screen Negative    Opiates Urine Screen Negative Screen Negative    PCP Urine Screen Negative Screen Negative   Comprehensive metabolic panel    Collection Time: 06/26/24  9:27 PM   Result Value Ref Range    Sodium 136 135 - 145 mmol/L    Potassium 3.9 3.4 - 5.3 mmol/L    Carbon Dioxide (CO2) 29 22 - 29 mmol/L    Anion Gap 7 7 - 15 mmol/L    Urea Nitrogen 12.1 6.0 - 20.0 mg/dL    Creatinine 0.86 0.67 - 1.17 mg/dL    GFR Estimate >90 >60 mL/min/1.73m2    Calcium 9.3 8.6 - 10.0 mg/dL    Chloride 100 98 - 107 mmol/L    Glucose 110 (H) 70 - 99 mg/dL    Alkaline Phosphatase 75 40 - 150 U/L    AST 31 0 - 45 U/L    ALT 37 0 - 70 U/L    Protein Total 6.9 6.4 - 8.3 g/dL    Albumin 4.3 3.5 - 5.2 g/dL    Bilirubin Total 0.4 <=1.2 mg/dL   CK total    Collection Time: 06/26/24  9:27 PM   Result Value Ref Range     39 - 308 U/L   CBC with platelets and differential    Collection Time: 06/26/24  9:27 PM   Result Value Ref Range    WBC Count 7.5 4.0 - 11.0 10e3/uL    RBC Count 4.68 4.40 - 5.90 10e6/uL    Hemoglobin 14.3 13.3 - 17.7 g/dL    Hematocrit 42.4 40.0 - 53.0 %    MCV 91 78 - 100 fL    MCH 30.6 26.5 - 33.0 pg    MCHC 33.7 31.5  - 36.5 g/dL    RDW 13.0 10.0 - 15.0 %    Platelet Count 257 150 - 450 10e3/uL    % Neutrophils 61 %    % Lymphocytes 26 %    % Monocytes 11 %    % Eosinophils 2 %    % Basophils 0 %    % Immature Granulocytes 0 %    NRBCs per 100 WBC 0 <1 /100    Absolute Neutrophils 4.6 1.6 - 8.3 10e3/uL    Absolute Lymphocytes 1.9 0.8 - 5.3 10e3/uL    Absolute Monocytes 0.8 0.0 - 1.3 10e3/uL    Absolute Eosinophils 0.2 0.0 - 0.7 10e3/uL    Absolute Basophils 0.0 0.0 - 0.2 10e3/uL    Absolute Immature Granulocytes 0.0 <=0.4 10e3/uL    Absolute NRBCs 0.0 10e3/uL   Urine Drug Screen Panel    Collection Time: 06/26/24  9:29 PM   Result Value Ref Range    Amphetamines Urine Screen Negative Screen Negative    Barbituates Urine Screen Negative Screen Negative    Benzodiazepine Urine Screen Negative Screen Negative    Cannabinoids Urine Screen Negative Screen Negative    Cocaine Urine Screen Negative Screen Negative    Fentanyl Qual Urine Screen Negative Screen Negative    Opiates Urine Screen Negative Screen Negative    PCP Urine Screen Negative Screen Negative   Symptomatic COVID-19 Virus (Coronavirus) by PCR Nasopharyngeal    Collection Time: 06/27/24  6:52 AM    Specimen: Nasopharyngeal; Swab   Result Value Ref Range    SARS CoV2 PCR Negative Negative   Lipid Profile    Collection Time: 06/29/24  7:55 AM   Result Value Ref Range    Cholesterol 209 (H) <200 mg/dL    Triglycerides 281 (H) <150 mg/dL    Direct Measure HDL 70 >=40 mg/dL    LDL Cholesterol Calculated 83 <=100 mg/dL    Non HDL Cholesterol 139 (H) <130 mg/dL   Hemoglobin A1c    Collection Time: 06/29/24  7:55 AM   Result Value Ref Range    Hemoglobin A1C 5.4 <5.7 %       Psychiatric Examination:   Temp: 98  F (36.7  C) Temp src: Oral BP: 123/75 Pulse: 87   Resp: 18 SpO2: 100 % O2 Device: None (Room air)    Weight is 177 lbs 0 oz  Body mass index is 22.73 kg/m .    Appearance:  awake, alert, dressed in hospital scrubs, disheveled, hair is unkempt  Attitude:  less  cooperative  Eye Contact:  poor   Mood:  angry  Affect:  intensity is heightened, labile  Speech:  pressured speech, increased volume  Psychomotor Behavior:  fidgeting and physical agitation  Thought Process:  disorganized and illogical  Associations:  loosening of associations present  Thought Content:  thoughts focused on being hospitalized against his will, no evidence of suicidal ideation or homicidal ideation  Insight:   poor   Judgement:   poor   Oriented to:   not formally tested, grossly intact  Attention Span and Concentration:  poor, difficult to engage and unable to redirect  Recent and Remote Memory:   not formally tested, appears to be impaired   Fund of Knowledge:  not formally tested, appears to be normal      Precautions:     Behavioral Orders   Procedures    Assault precautions    Code 1 - Restrict to Unit    Elopement precautions    Fall precautions    Routine Programming     As clinically indicated    Status 15     Every 15 minutes.    Status Individual Observation     Patient SIO 2:1 status reviewed with team/RN.  Please also refer to RN/team documentation for add'l detail.  2:1     -SIO staff to monitor following which have contributed to patient being on SIO:  Pt was observed pushing on security staff in the ER with intent of leaving the hospital.  -Possible interventions SIO staff could use to support patient's treatment progress:  Offer redirections   -When following observed, team will review discontinuation of SIO:  Able to sign in voluntary and contract for safety     Order Specific Question:   CONTINUOUS 24 hours / day     Answer:   Other     Order Specific Question:   Specify distance     Answer:   10     Order Specific Question:   Indications for SIO     Answer:   Assault risk     Order Specific Question:   Indications for SIO     Answer:   Severe intrusiveness    Suicide precautions: Suicide Risk: HIGH; Clinical rationale to override score: modification to the care environment      "Patients on Suicide Precautions should have a Combination Diet ordered that includes a Diet selection(s) AND a Behavioral Tray selection for Safe Tray - with utensils, or Safe Tray - NO utensils       Order Specific Question:   Suicide Risk     Answer:   HIGH     Order Specific Question:   Clinical rationale to override score:     Answer:   modification to the care environment       Diagnoses:   Bipolar affective disorder, current episode manic, vs Schizoaffective disorder, bipolar type, manic  Stimulant use disorder, per chart  Cannabis use, per chart           Assessment & Recommendations:   Assessment:  22 yo M with a history of bipolar disorder, stimulant use disorder, and cannabis use,   who presented to [facility] ED via EMS for psychosis and manic behaviors. Medically cleared in ED, placed on a 72HH.  Admitted to [facility] under care of Dr. Henry.  Petition for commitment with [facility] submitted through [facility].  Patient requested a second opinion from \"a female provider\" due to disagreement with current legal status and petition for MH Commitment with [facility].  Reviewed patient's chart, including notes from this admission. Per chart and on assessment, patient does appear to have ongoing manic symptoms including disorganization, psychomotor agitation, pressured speech, impulsivity, and distractibility. He has shown limited insight into his illness and symptoms and need for treatment.      Psychiatric treatment/interventions and recommendations:  - As above, agree with current treatment plan including continuing court hold and petition for MH commitment with [facility].      Laboratory/Imaging:  Reviewed since admission. Cholesterol (209), TG (281), and Non-HDL cholesterol (139) all elevated.     Patient will be treated in therapeutic milieu with appropriate individual and group therapies as described.     Medical treatment/interventions:  Medical concerns:  No acute concerns noted on admission      Disposition " Plan:  Per primary team        This note was created with the help of Dragon dictation system. All grammatical/typing errors or context distortion are unintentional and inherent to software.    Attestation:  Patient has been seen and evaluated by me, MIGUEL Villarreal, CNP.  I spent >50 min spent on the date of the encounter in chart review, patient visit, review of tests, documentation, care coordination, and/or discussion with other providers about the issues documented above.

## 2024-07-09 NOTE — PLAN OF CARE
BEH IP Unit Acuity Rating Score (UARS)  Patient is given one point for every criteria they meet.    CRITERIA SCORING   On a 72 hour hold, court hold, committed, stay of commitment, or revocation. 1    Patient LOS on BEH unit exceeds 20 days. 0  LOS: 12   Patient under guardianship, 55+, otherwise medically complex, or under age 11. 0   Suicide ideation without relief of precipitating factors. 0   Current plan for suicide. 0   Current plan for homicide. 0   Imminent risk or actual attempt to seriously harm another without relief of factors precipitating the attempt. 0   Severe dysfunction in daily living (ex: complete neglect for self care, extreme disruption in vegetative function, extreme deterioration in social interactions). 1   Recent (last 7 days) or current physical aggression in the ED or on unit. 0   Restraints or seclusion episode in past 72 hours. 0   Recent (last 7 days) or current verbal aggression, agitation, yelling, etc., while in the ED or unit. 0     Active psychosis. 1   Need for constant or near constant redirection (from leaving, from others, etc).  1   Intrusive or disruptive behaviors. 1   Patient requires 3 or more hours of individualized nursing care per 8-hour shift (i.e. for ADLs, meds, therapeutic interventions). 0   TOTAL 6

## 2024-07-09 NOTE — PLAN OF CARE
Problem: Anxiety  Goal: Anxiety Reduction or Resolution  Outcome: Progressing   Goal Outcome Evaluation:  Patient observed in assigned room at the beginning of the night shift and appeared to be comfortably asleep. Patient woke up in the middle of the night and requested snacks which he was given. Patient endorsed some anxiety and requested medication for sleep.   PRN Medications administered - Hydroxyzine 50 mg for anxiety and Trazodone 50 mg for sleep.  Denied pain.   No additional issues or concerns reported or observed. Safety checks completed at least every 15 minutes.   SIO in place according to order for assault risk and severe intrusiveness.  Sleep hours - 6.25.  Nursing will continue to monitor.

## 2024-07-09 NOTE — PLAN OF CARE
Goal Outcome Evaluation:    Plan of Care Reviewed With: patient      Problem: Psychotic Signs/Symptoms  Goal: Improved Behavioral Control (Psychotic Signs/Symptoms)  Outcome: Progressing  Intervention: Manage Behavior  Recent Flowsheet Documentation  Taken 7/9/2024 1700 by Fabiana Ashby RN  De-Escalation Techniques: verbally redirected     At the beginning of the shift, patient appeared restless and disorganized, moving from his room to the milieu. Patient would sit for a second and get up again. Patient was demanding. He would make request and forgets about it and walks away. Patient continued the back and forth movement for a brief period. He finally went back to take a nap. Patient is on 2:1 SIO for safety.   Patient woke up for dinner at 1800 having slept for 2 hours. He ate dinner and then became loud requesting for his night medications. RN tried to make him wait for another hour to take them because it too early for night medications, but patient's loud voice was disrupting the unit. Patient's night medications were given at 1800. 30 minutes later patient came back to request for anxiety medication and sleep medication. Patient was given PRN Hydroxyzine and Trazodone at 1842. While the direct RN was on break, the Charge RN administered PRN Haldol and Benadryl at 1935. Reported that patient's disruptive behavior continued requiring the PRN medications. Patient calmed down after receiving the last medications.
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
## 2024-07-10 PROCEDURE — 124N000002 HC R&B MH UMMC

## 2024-07-10 PROCEDURE — 250N000013 HC RX MED GY IP 250 OP 250 PS 637: Performed by: EMERGENCY MEDICINE

## 2024-07-10 PROCEDURE — 99232 SBSQ HOSP IP/OBS MODERATE 35: CPT | Performed by: PSYCHIATRY & NEUROLOGY

## 2024-07-10 PROCEDURE — 250N000013 HC RX MED GY IP 250 OP 250 PS 637: Performed by: CLINICAL NURSE SPECIALIST

## 2024-07-10 PROCEDURE — 250N000013 HC RX MED GY IP 250 OP 250 PS 637: Performed by: PSYCHIATRY & NEUROLOGY

## 2024-07-10 PROCEDURE — H2032 ACTIVITY THERAPY, PER 15 MIN: HCPCS | Performed by: PSYCHOLOGIST

## 2024-07-10 RX ADMIN — GABAPENTIN 400 MG: 400 CAPSULE ORAL at 08:19

## 2024-07-10 RX ADMIN — OLANZAPINE 15 MG: 10 TABLET, ORALLY DISINTEGRATING ORAL at 08:19

## 2024-07-10 RX ADMIN — OLANZAPINE 15 MG: 10 TABLET, ORALLY DISINTEGRATING ORAL at 18:39

## 2024-07-10 RX ADMIN — GABAPENTIN 400 MG: 400 CAPSULE ORAL at 18:39

## 2024-07-10 RX ADMIN — NICOTINE POLACRILEX 2 MG: 2 LOZENGE ORAL at 14:44

## 2024-07-10 RX ADMIN — DIPHENHYDRAMINE HYDROCHLORIDE 50 MG: 50 CAPSULE ORAL at 13:01

## 2024-07-10 RX ADMIN — HYDROXYZINE HYDROCHLORIDE 50 MG: 50 TABLET, FILM COATED ORAL at 17:32

## 2024-07-10 RX ADMIN — TRAZODONE HYDROCHLORIDE 50 MG: 50 TABLET ORAL at 18:42

## 2024-07-10 RX ADMIN — OLANZAPINE 10 MG: 10 TABLET, FILM COATED ORAL at 16:07

## 2024-07-10 RX ADMIN — NICOTINE 1 PATCH: 21 PATCH, EXTENDED RELEASE TRANSDERMAL at 08:18

## 2024-07-10 RX ADMIN — HALOPERIDOL 5 MG: 5 TABLET ORAL at 13:01

## 2024-07-10 RX ADMIN — GABAPENTIN 400 MG: 400 CAPSULE ORAL at 14:37

## 2024-07-10 RX ADMIN — Medication 50 MCG: at 08:19

## 2024-07-10 ASSESSMENT — ACTIVITIES OF DAILY LIVING (ADL)
LAUNDRY: WITH SUPERVISION
DRESS: INDEPENDENT
ADLS_ACUITY_SCORE: 40
ORAL_HYGIENE: INDEPENDENT
ADLS_ACUITY_SCORE: 40
ADLS_ACUITY_SCORE: 50
ADLS_ACUITY_SCORE: 50
ADLS_ACUITY_SCORE: 40
LAUNDRY: UNABLE TO COMPLETE
ADLS_ACUITY_SCORE: 40
ADLS_ACUITY_SCORE: 40
HYGIENE/GROOMING: INDEPENDENT
ADLS_ACUITY_SCORE: 40
ADLS_ACUITY_SCORE: 50
HYGIENE/GROOMING: INDEPENDENT;PROMPTS
ADLS_ACUITY_SCORE: 40
ADLS_ACUITY_SCORE: 40
ORAL_HYGIENE: INDEPENDENT;PROMPTS
ADLS_ACUITY_SCORE: 40
DRESS: INDEPENDENT
ADLS_ACUITY_SCORE: 40
ADLS_ACUITY_SCORE: 50
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
## 2024-07-10 NOTE — PLAN OF CARE
Pt appeared to sleep for a total of 6.75 hours overnight. No PRN medications were administered and no concerns were noted.     Problem: Sleep Disturbance  Goal: Adequate Sleep/Rest  Outcome: Progressing

## 2024-07-10 NOTE — PLAN OF CARE
BEH IP Unit Acuity Rating Score (UARS)  Patient is given one point for every criteria they meet.     CRITERIA SCORING   On a 72 hour hold, court hold, committed, stay of commitment, or revocation. 1    Patient LOS on BEH unit exceeds 20 days. 0  LOS: 13   Patient under guardianship, 55+, otherwise medically complex, or under age 11. 0   Suicide ideation without relief of precipitating factors. 0   Current plan for suicide. 0   Current plan for homicide. 0   Imminent risk or actual attempt to seriously harm another without relief of factors precipitating the attempt. 0   Severe dysfunction in daily living (ex: complete neglect for self care, extreme disruption in vegetative function, extreme deterioration in social interactions). 1   Recent (last 7 days) or current physical aggression in the ED or on unit. 0   Restraints or seclusion episode in past 72 hours. 0   Recent (last 7 days) or current verbal aggression, agitation, yelling, etc., while in the ED or unit. 0      Active psychosis. 1   Need for constant or near constant redirection (from leaving, from others, etc).  1   Intrusive or disruptive behaviors. 1   Patient requires 3 or more hours of individualized nursing care per 8-hour shift (i.e. for ADLs, meds, therapeutic interventions). 0   TOTAL 6
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
## 2024-07-10 NOTE — PROGRESS NOTES
Rehab Group    Start time: 1015  End time: 1215  Patient time total: 15 minutes    attended partial group    #8 attended   Group Type: occupational therapy   Group Topic Covered: coping skills     Group Session Detail:  OT clinic     Patient Response/Contribution:  cooperative with task and left group on several occasions     Patient Detail:    Pt actively participated in occupational therapy clinic to facilitate coping skill exploration, creative expression within personally meaningful activities, and clinical observation of social, cognitive, and kinesthetic performance skills. Pt response: Minimal assistance needed to initiate, gather materials, sequence, and adjust to workspace demands as needed. Demonstrated limited focus (approximately x5 minutes), planning, and attention to detail for selected structured creative expression task. Able to ask for assistance as needed, and socialized with peers and staff. In and out of group several times, only attending for brief intervals (no charge), appearing distractible. Affect appeared elated in interactions.        No Charge    Patient Active Problem List   Diagnosis    Anxiety    Adjustment disorder with anxious mood    Polysubstance abuse (H)    Manic behavior (H)    Psychosis (H)    Bipolar affective disorder, current episode manic (H)    Schizoaffective disorder, bipolar type (H)    Suicidal ideation    Agitation    Itzel (H)

## 2024-07-10 NOTE — PROVIDER NOTIFICATION
Combination PRN Medication Administration    Medications Administered: Benadryl+Haldol      What patient symptom(s) led to the administration of these medications?  The patient is disorganized, restless, and impulsive.  The patient will intermittently run fast down the espinoza.  The does not respond when asked to walk safely.  The patient is in the lounge, yelling, tangential statements.  The patient verbalizes anxiety and agitation d/t pending court information.        What interventions were attempted to avoid use of these medication (including other PRN medications)?   Multiple attempts to redirect the patient.  To walk safely.  Distract the patient by offering 1:1 conversation and unit activities.  Use of the weighted blanket.     What were the patient's response(s) to the interventions?   The patient is unable to follow commands.  The patient is unable to receive redirections.      Provider notified: Dr. Otto       Date: 07/10/24        Time: 1312      CONNIE YORK RN

## 2024-07-10 NOTE — PLAN OF CARE
Team Note Due:  Friday    Assessment/Intervention/Current Symtoms and Care Coordination:  Chart review and met with team, discussed pt progress, symptomology, and response to treatment.  Discussed the discharge plan and any potential impediments to discharge.    I received a vm  from the   - Victoria Christianson @ again this morning asking about the  second 72 hour hold.        Court  asked for DA. DA was sent 7/10/24.  Neha Humphries  Senior   [facility] Behavioral Health Assessment/Intake  Office: [phone] Fax: [phone]  [email or web address], [email or web address],   [address] [place] [address]      Discharge Plan or Goal:  IRTS       Barriers to Discharge:  Pt is ill and does not have providers     Referral Status:  Too early  Pt has no established providers       Medical Assistance  FC calls unit to talk to pt about applying for MA but was told he was too clinically stable      Legal Status:  Court hold as of 3:51 on 24      [facility] Dipti  File Number: [id number]  Start and expiration date of commitment:       Exam: 7/10 @ 9AM and Preliminary Hearing to follow.(Completed)  Final Hearin/15 @ TBD    Contacts:      [place]USSE[place] - Nathaly Kohler  [facility] Attorney: Victoria Christianson @ [phone]    CLIFTON for aunt.Aunt:Nita @ [phone])  Father:Subhash @ [phone]        Upcoming Meetings and Dates/Important Information and next steps:      Final Hearin/15 @ TBD

## 2024-07-11 PROCEDURE — 250N000013 HC RX MED GY IP 250 OP 250 PS 637: Performed by: PSYCHIATRY & NEUROLOGY

## 2024-07-11 PROCEDURE — 250N000013 HC RX MED GY IP 250 OP 250 PS 637: Performed by: CLINICAL NURSE SPECIALIST

## 2024-07-11 PROCEDURE — 99232 SBSQ HOSP IP/OBS MODERATE 35: CPT | Performed by: PSYCHIATRY & NEUROLOGY

## 2024-07-11 PROCEDURE — 124N000002 HC R&B MH UMMC

## 2024-07-11 RX ORDER — DIVALPROEX SODIUM 250 MG/1
250 TABLET, EXTENDED RELEASE ORAL 2 TIMES DAILY
Status: DISCONTINUED | OUTPATIENT
Start: 2024-07-11 | End: 2024-07-15

## 2024-07-11 RX ADMIN — NICOTINE POLACRILEX 4 MG: 2 LOZENGE ORAL at 18:23

## 2024-07-11 RX ADMIN — NICOTINE 1 PATCH: 21 PATCH, EXTENDED RELEASE TRANSDERMAL at 08:50

## 2024-07-11 RX ADMIN — OLANZAPINE 15 MG: 10 TABLET, ORALLY DISINTEGRATING ORAL at 18:46

## 2024-07-11 RX ADMIN — HALOPERIDOL 5 MG: 5 TABLET ORAL at 16:05

## 2024-07-11 RX ADMIN — DIVALPROEX SODIUM 250 MG: 250 TABLET, EXTENDED RELEASE ORAL at 18:45

## 2024-07-11 RX ADMIN — TRAZODONE HYDROCHLORIDE 50 MG: 50 TABLET ORAL at 18:46

## 2024-07-11 RX ADMIN — OLANZAPINE 10 MG: 10 TABLET, FILM COATED ORAL at 00:43

## 2024-07-11 RX ADMIN — GABAPENTIN 400 MG: 400 CAPSULE ORAL at 14:06

## 2024-07-11 RX ADMIN — TRAZODONE HYDROCHLORIDE 50 MG: 50 TABLET ORAL at 00:43

## 2024-07-11 RX ADMIN — GABAPENTIN 400 MG: 400 CAPSULE ORAL at 08:49

## 2024-07-11 RX ADMIN — GABAPENTIN 400 MG: 400 CAPSULE ORAL at 18:46

## 2024-07-11 RX ADMIN — DIPHENHYDRAMINE HYDROCHLORIDE 50 MG: 50 CAPSULE ORAL at 16:05

## 2024-07-11 RX ADMIN — Medication 50 MCG: at 08:49

## 2024-07-11 RX ADMIN — OLANZAPINE 15 MG: 10 TABLET, ORALLY DISINTEGRATING ORAL at 08:49

## 2024-07-11 ASSESSMENT — ACTIVITIES OF DAILY LIVING (ADL)
ADLS_ACUITY_SCORE: 40
DRESS: INDEPENDENT
LAUNDRY: WITH SUPERVISION
ADLS_ACUITY_SCORE: 50
ADLS_ACUITY_SCORE: 40
ADLS_ACUITY_SCORE: 50
ADLS_ACUITY_SCORE: 50
ORAL_HYGIENE: INDEPENDENT
ADLS_ACUITY_SCORE: 40
ADLS_ACUITY_SCORE: 50
ADLS_ACUITY_SCORE: 50
ORAL_HYGIENE: INDEPENDENT
DRESS: INDEPENDENT
HYGIENE/GROOMING: INDEPENDENT
ADLS_ACUITY_SCORE: 50
ADLS_ACUITY_SCORE: 40
ADLS_ACUITY_SCORE: 50
ADLS_ACUITY_SCORE: 50
ADLS_ACUITY_SCORE: 40
ADLS_ACUITY_SCORE: 50
HYGIENE/GROOMING: INDEPENDENT
LAUNDRY: WITH SUPERVISION
ADLS_ACUITY_SCORE: 50
ADLS_ACUITY_SCORE: 40
ADLS_ACUITY_SCORE: 40

## 2024-07-11 NOTE — PROGRESS NOTES
"[facility], [place]   Psychiatric Progress Note        Interim History:     The patient's care was discussed with the treatment team during the daily team meeting and/or staff's chart notes were reviewed.  Staff report patient slept for 6.75 hours last night. He continued to be compliant with meds and care, still needing frequent redirections as of not to run in this unit corridor, not to talk about strippers in groups, see RN's note below:    \"Lamine has been going by his formal given name this evening and not \"Serafin\". He stated \"Serafin\" is no longer here. Lamine has been very active this evening in the department. Has frequently needed to be redirected to not run in the halls. He was on the exercise bike for a short period of time. He has frequently been at the desk requesting \"Give me all the prn's you have, I just want to sleep.\" He went to evening programing for a very short period of time. He has poor concentration. His Aunt called this evening checking to see if \"Is the hospital really discharging Lamine?\" As Lamine had called her and stated \"I'm being discharged.\" His speech is loud, pressured and hyper verbal. He remains very impulsive. He informed this writer \"I just want to sleep to get my brain to rest.\" He requested evening medications early and fell asleep just before 2000. He ate well for supper. Lamine continues on Suicide, Fall, Assault and elopement precautions. He has a No Roommate order due to being disruptive during the night. He continues on a 2:1 SIO for Severe intrusiveness. Nursing to continue to support current plan of care.\"     Met with patient: patient was sleeping in his bed with 2 SIO staff sitting outside of his room. He reluctantly woke up and talked to me. Started pretty sarcastically telling me: \"I am all well, my life is great, everything is excellent\"! He calmed down, then, started talking in a normal tone. Asked about discharge and was less " "irritated when I reminded that he would still have court on 7/15 and would have to wait at this hospital until then and could not go to his auntie as he requested. I also told Serafin that he might not be discharged from this hospital immediately after his court date and might need to stay for a little bit longer until he gets better. Today he took my words more calmly and didn't argue, said that he had no more questions for me.         Medications:     Current Facility-Administered Medications   Medication Dose Route Frequency Provider Last Rate Last Admin    divalproex sodium extended-release (DEPAKOTE ER) 24 hr tablet 250 mg  250 mg Oral BID Jese Arizmendi MD        gabapentin (NEURONTIN) capsule 400 mg  400 mg Oral TID Jese Arizmendi MD   400 mg at 07/11/24 1406    nicotine (NICODERM CQ) 21 MG/24HR 24 hr patch 1 patch  1 patch Transdermal Daily Jese Arizmendi MD   1 patch at 07/11/24 0850    OLANZapine zydis (zyPREXA) ODT tab 15 mg  15 mg Oral BID Jese Arizmendi MD   15 mg at 07/11/24 0849    Vitamin D3 (CHOLECALCIFEROL) tablet 50 mcg  50 mcg Oral At Bedtime Maria Pennington APRN CNP   50 mcg at 07/11/24 0849          Allergies:     Allergies   Allergen Reactions    Paliperidone Other (See Comments)     Other reaction(s): Extrapyramidal Symptoms   Significant EPS    Significant EPS    Pork Allergy Other (See Comments)     Latter-day prohibition          Labs:   No results found for this or any previous visit (from the past 24 hour(s)).       Psychiatric Examination:     /83   Pulse 102   Temp 98  F (36.7  C) (Oral)   Resp 18   Ht 1.88 m (6' 2\")   Wt 79.8 kg (176 lb)   SpO2 98%   BMI 22.60 kg/m    Weight is 176 lbs 0 oz  Body mass index is 22.6 kg/m .    Orthostatic Vitals         Most Recent      Sitting Orthostatic /74 07/10 0846    Sitting Orthostatic Pulse (bpm) 107 07/10 0846    Standing Orthostatic /68 07/11 0842    Standing Orthostatic Pulse (bpm) 106 " 07/11 0842           Appearance: dressed in hospital scrubs  Attitude:  minimally cooperative  Eye Contact:  less tense  Mood:  less labile, irritable  Affect:  intensity is exaggerated and intensity is dramatic  Speech: less pressured speech  Psychomotor Behavior:  no evidence of tardive dyskinesia, dystonia, or tics and physical agitation  Throught Process: disorganized and illogical  Associations:  loosening of associations present  Thought Content:  grandiose delusions  Insight:  limited  Judgement:  poor  Oriented to:  time, person, and place  Attention Span and Concentration:  poor  Recent and Remote Memory:  poor    Clinical Global Impressions    First: 7/4 7/1/2024      Most recent:            Precautions:     Behavioral Orders   Procedures    Assault precautions    Code 1 - Restrict to Unit    Elopement precautions    Fall precautions    Routine Programming     As clinically indicated    Status 15     Every 15 minutes.    Status Individual Observation     Patient SIO 2:1 status reviewed with team/RN.  Please also refer to RN/team documentation for add'l detail.  2:1     -SIO staff to monitor following which have contributed to patient being on SIO:  Pt was observed pushing on security staff in the ER with intent of leaving the hospital.  -Possible interventions SIO staff could use to support patient's treatment progress:  Offer redirections   -When following observed, team will review discontinuation of SIO:  Able to sign in voluntary and contract for safety     Order Specific Question:   CONTINUOUS 24 hours / day     Answer:   Other     Order Specific Question:   Specify distance     Answer:   10     Order Specific Question:   Indications for SIO     Answer:   Assault risk     Order Specific Question:   Indications for SIO     Answer:   Severe intrusiveness    Suicide precautions: Suicide Risk: HIGH; Clinical rationale to override score: modification to the care environment     Patients on Suicide  Precautions should have a Combination Diet ordered that includes a Diet selection(s) AND a Behavioral Tray selection for Safe Tray - with utensils, or Safe Tray - NO utensils       Order Specific Question:   Suicide Risk     Answer:   HIGH     Order Specific Question:   Clinical rationale to override score:     Answer:   modification to the care environment          DIagnoses:     Bipolar affective disorder, candie vs Schizoaffective disorder, bipolar type, manic.  Historical dx of stimulants use disorder.         Plan:     Patient is on a court hold, he had his first court hold 7/10/2024 and final hearing will be on 7/15. He continues to be very manic. Will start on Depakote ER 7/11/2024. Will continue with SIOx2 and No roommate. Will continue to provide support and structure.    Total time spent was 37 minutes. Over 50% of times was spent counseling and coordination of care regarding coping skills, medication and discharge planning.

## 2024-07-11 NOTE — PLAN OF CARE
Night Nursing Note   7/10/24 - 7/11/24        Lamine was in bed at beginning of shift and appeared asleep.  Monitored on 2:1 for severe intrusiveness, assault, and q15 mins for safety.  Awake @ 0030. Requested and given snacks of juice, toast and honey.  Offered and accepted zyprexa 10mg and repeat trazodone 50mg for sleep.  Took snacks back to room and and returned back to sleep.  Appeared to sleep majority of night without difficulty.  Continue to monitor, offer support and reassurance per care plan.    Slept 6.75 hrs.

## 2024-07-11 NOTE — PLAN OF CARE
Goal Outcome Evaluation:    Plan of Care Reviewed With: patient                 The patient woke up briefly for his vital signs and breakfast.  The patient is loud and dysregulated when he woke.  The patient returned to his room.  The patient responds to his voice.  Woke up for medication administration when given and at the time due.  The patient stated, he wanted to sleep.  The patient did not respond to a verbal assessment.  The patient continues on 2:1 for unsafe and dysregulated behaviors.      Will continue to monitor and update treatment plan when needed.

## 2024-07-11 NOTE — PLAN OF CARE
BEH IP Unit Acuity Rating Score (UARS)  Patient is given one point for every criteria they meet.     CRITERIA SCORING   On a 72 hour hold, court hold, committed, stay of commitment, or revocation. 1    Patient LOS on BEH unit exceeds 20 days. 0  LOS: 14    Patient under guardianship, 55+, otherwise medically complex, or under age 11. 0   Suicide ideation without relief of precipitating factors. 0   Current plan for suicide. 0   Current plan for homicide. 0   Imminent risk or actual attempt to seriously harm another without relief of factors precipitating the attempt. 0   Severe dysfunction in daily living (ex: complete neglect for self care, extreme disruption in vegetative function, extreme deterioration in social interactions). 1   Recent (last 7 days) or current physical aggression in the ED or on unit. 0   Restraints or seclusion episode in past 72 hours. 0   Recent (last 7 days) or current verbal aggression, agitation, yelling, etc., while in the ED or unit. 0      Active psychosis. 1   Need for constant or near constant redirection (from leaving, from others, etc).  1   Intrusive or disruptive behaviors. 1   Patient requires 3 or more hours of individualized nursing care per 8-hour shift (i.e. for ADLs, meds, therapeutic interventions). 0   TOTAL 5

## 2024-07-11 NOTE — PLAN OF CARE
"  Problem: Psychotic Signs/Symptoms  Goal: Improved Behavioral Control (Psychotic Signs/Symptoms)  Outcome: Progressing   Goal Outcome Evaluation:    Plan of Care Reviewed With: patient        Lamine has been going by his formal given name this evening and not \"Serafin\".  He stated \"Serafin\" is no longer here. Lamine has been very active this evening in the department. Has frequently needed to be redirected to not run in the halls. He was on the exercise bike for a short period of time. He has frequently been at the desk requesting \"Give me all the prn's you have, I just want to sleep.\" He went to evening programing for a very short period of time. He has poor concentration. His Aunt called this evening checking to see if \"Is the hospital really discharging Lamine?\" As Lamine had called her and stated \"I'm being discharged.\" His speech is loud, pressured and hyper verbal. He remains very impulsive. He informed this writer \"I just want to sleep to get my brain to rest.\" He requested evening medications early and fell asleep just before 2000. He ate well for supper. Lamine continues on Suicide, Fall, Assault and elopement precautions. He has a No Roommate order due to being disruptive during the night. He continues on a 2:1 SIO for  Severe intrusiveness. Nursing to continue to support current plan of care.                 "

## 2024-07-11 NOTE — PROGRESS NOTES
"  Rehab Group    Start time: 1700  End time: 1800  Patient time total: 30 minutes    attended partial group    #4 attended   Group Type: art   Group Topic Covered: mindfulness       Group Session Detail:  Art Therapy directive was to create a drawing of a peaceful place in response to a guided imagery/peaceful place relaxation script.  Goals of directive: trauma containment, DBT mindfulness skills, emotional regulation     Patient Response/Contribution:  worked intermittently, distractible       Patient Detail:    Pt hurried through task, creating three drawings in a short period of time. Pt was off topic at times during conversation, distractible. Pt created a drawing of the future house he would like to live in someday. Pt shared that what would make this future home a peaceful place is no fighting among family members. Pt said that he has \"known nothing but fighting\" (arguing) amongst family members for the majority of his life.   Pt also created a drawing of the view of the river from the OT room. Pt described nature environments as peaceful calming settings for pt.  Pt also created a third drawing of \"flying dollar bills\" and made an inappropriate comment referring to paying money to strippers. Pt was redirectable when given a warning regarding inappropriate comments.          Activity Therapy Per 15 minutes () Other Rehab therapies    Patient Active Problem List   Diagnosis    Anxiety    Adjustment disorder with anxious mood    Polysubstance abuse (H)    Manic behavior (H)    Psychosis (H)    Bipolar affective disorder, current episode manic (H)    Schizoaffective disorder, bipolar type (H)    Suicidal ideation    Agitation    Itzel (H)      "

## 2024-07-11 NOTE — PLAN OF CARE
Team Note Due:  Friday    Assessment/Intervention/Current Symtoms and Care Coordination:  Chart review and met with team, discussed pt progress, symptomology, and response to treatment.  Discussed the discharge plan and any potential impediments to discharge.     Blake was able to talk to patient today.    I let court admin know that Dr. Arizmendi will not be available on Monday to testify.        Discharge Plan or Goal:  IRTS       Barriers to Discharge:  Pt is ill and does not have providers     Referral Status:  Too early  Pt has no established providers       Medical Assistance  FC calls unit to talk to pt about applying for MA but was told he was too clinically stable      Legal Status:  Court hold as of 3:51 on 24      [facility]: [facility]  File Number: [id number]  Start and expiration date of commitment:       Final Hearin/15 @ TBD    Court  asked for DA. DA was sent 7/10/24.  Neha Humphries  Senior   [facility] Behavioral Health Assessment/Intake  Office: [phone] Fax: [phone]  [email or web address], [id number] [place] [address]      Contacts:      HUSSEIN Andersen [facility] Attorney: Victoria Christianson @ [phone]    CLIFTON for aunt.Aunt:Nita @ [phone])  Father:Subhash @ [phone]        Upcoming Meetings and Dates/Important Information and next steps:      Final Hearin/15 @ TBD

## 2024-07-12 PROCEDURE — 250N000013 HC RX MED GY IP 250 OP 250 PS 637: Performed by: PSYCHIATRY & NEUROLOGY

## 2024-07-12 PROCEDURE — 124N000002 HC R&B MH UMMC

## 2024-07-12 PROCEDURE — 250N000013 HC RX MED GY IP 250 OP 250 PS 637: Performed by: CLINICAL NURSE SPECIALIST

## 2024-07-12 PROCEDURE — 250N000013 HC RX MED GY IP 250 OP 250 PS 637: Performed by: EMERGENCY MEDICINE

## 2024-07-12 PROCEDURE — 99232 SBSQ HOSP IP/OBS MODERATE 35: CPT | Performed by: PSYCHIATRY & NEUROLOGY

## 2024-07-12 RX ADMIN — DIVALPROEX SODIUM 250 MG: 250 TABLET, EXTENDED RELEASE ORAL at 08:35

## 2024-07-12 RX ADMIN — Medication 50 MCG: at 08:35

## 2024-07-12 RX ADMIN — OLANZAPINE 15 MG: 10 TABLET, ORALLY DISINTEGRATING ORAL at 08:35

## 2024-07-12 RX ADMIN — GABAPENTIN 400 MG: 400 CAPSULE ORAL at 13:33

## 2024-07-12 RX ADMIN — OLANZAPINE 15 MG: 10 TABLET, ORALLY DISINTEGRATING ORAL at 19:47

## 2024-07-12 RX ADMIN — NICOTINE POLACRILEX 4 MG: 2 LOZENGE ORAL at 19:06

## 2024-07-12 RX ADMIN — NICOTINE POLACRILEX 4 MG: 2 LOZENGE ORAL at 14:46

## 2024-07-12 RX ADMIN — ACETAMINOPHEN 650 MG: 325 TABLET, FILM COATED ORAL at 16:21

## 2024-07-12 RX ADMIN — GABAPENTIN 400 MG: 400 CAPSULE ORAL at 08:35

## 2024-07-12 RX ADMIN — TRAZODONE HYDROCHLORIDE 50 MG: 50 TABLET ORAL at 19:47

## 2024-07-12 RX ADMIN — DIVALPROEX SODIUM 250 MG: 250 TABLET, EXTENDED RELEASE ORAL at 19:47

## 2024-07-12 RX ADMIN — NICOTINE 1 PATCH: 21 PATCH, EXTENDED RELEASE TRANSDERMAL at 08:35

## 2024-07-12 RX ADMIN — GABAPENTIN 400 MG: 400 CAPSULE ORAL at 19:47

## 2024-07-12 RX ADMIN — HYDROXYZINE HYDROCHLORIDE 50 MG: 50 TABLET, FILM COATED ORAL at 16:27

## 2024-07-12 ASSESSMENT — ACTIVITIES OF DAILY LIVING (ADL)
ADLS_ACUITY_SCORE: 40
DRESS: SCRUBS (BEHAVIORAL HEALTH)
ADLS_ACUITY_SCORE: 40
LAUNDRY: UNABLE TO COMPLETE
ADLS_ACUITY_SCORE: 40
ORAL_HYGIENE: INDEPENDENT
ADLS_ACUITY_SCORE: 40
HYGIENE/GROOMING: INDEPENDENT
ADLS_ACUITY_SCORE: 40

## 2024-07-12 NOTE — PLAN OF CARE
"Goal Outcome Evaluation:    Problem: Psychotic Signs/Symptoms  Goal: Improved Behavioral Control (Psychotic Signs/Symptoms)  Outcome: Progressing     Patient was visible in the beginning of the shift. He continues on 2:1 SIO due to being intrusive. He was pacing the espinoza; at times running in the espinoza and being disruptive in the milieu. Patient did eat both breakfast and lunch.    Patient asked to be given the \"max\" amount of medications so he could go to sleep. Patient was informed that this staff was going to give him his scheduled medications and monitor him afterwards. Patient was agreeable to this plan. Patient then went to his room and appeared to be sleeping. He later came out during lunch.     /69 (BP Location: Right arm)   Pulse 105   Temp 98.2  F (36.8  C) (Temporal)   Resp 18   Ht 1.88 m (6' 2\")   Wt 79.8 kg (176 lb)   SpO2 100%   BMI 22.60 kg/m      Patient's 2:1 was changed from 2:1 to 1:1 SIO.    Update: patient became irritated and was cursing. Being loud and disruptive on the unit. He had spoken with his  who allegedly told him he was \"mentally ill\". Patient was upset regarding this and is requesting a new . He went to several staff members to ask them if he looked like he was mentally ill. Attending provider was informed; he informed staff that if the patient continues to be agitated and disruptive, staff can inform on-call provider regarding placing the patient back on 2:1 SIO.    We'll continue to monitor.  "

## 2024-07-12 NOTE — PROGRESS NOTES
"[facility], [place]   Psychiatric Progress Note        Interim History:     The patient's care was discussed with the treatment team during the daily team meeting and/or staff's chart notes were reviewed.  Staff report patient again slept for 6.75 hours last night and was also seen napping during the day. Was compliant with meds and care, not as agitated and more redirectable/somewhat more organized, see RN's note below:    \"Lamine has been isolated and withdrawn to his room almost all shift. He asked for a prn early in the shift. Stayed in his room,fell asleep and was awakened for supper. He ate the majority of the food on his tray and returned to his room. He was cooperative with staff with moving to a different room on the unit. Informed this writer \" I'm going to be on my best behavior so I can be discharged after Court on Monday.\" Lamine was informed that he won't necessarily be discharged after court. Lamine stated \"I have to be discharged on Monday because I made a date with one of my girls.\"\"I didn't tell her I was in the hospital I told her I was in senior care.\" Lamine denies any SI/SIB/AH/VH. His speech remains loud and pressured. He continues to ambulate very quickly on the unit. He has needed frequently redirection from staff when out in the community areas of the unit. He continues on a 2:1 SIO for Severe Intrusiveness. Lamine has a no roommate order for disruption during the night. He continues on Suicide precautions. Nursing to continue to support current plan of care.\"     Met with patient: patient was interviewed with one SIO staff (he didn't allow 2nd staff to be present). Started with being agitated and irritable, told me that he was planning on being discharged from this hospital on Monday because he has a date with a beautiful girl. I told him that he might be discharged or might have to stay at this hospital for a longer time if  commits him. Serafin " "started yelling and almost walked out of the conference room, but then, came back. Stated that we wanted to keep him at this hospital \"because I am a fault man\". I assured him that the only reason for keeping him at this hospital was to get him better and then, discharge home, praised him for taking his meds. He appeared to be calmer and smiled when I told him that next week he will see a different provider: \"maybe, different provider will give me better news\"? He denied med side effects and had no questions or concerns.         Medications:     Current Facility-Administered Medications   Medication Dose Route Frequency Provider Last Rate Last Admin    divalproex sodium extended-release (DEPAKOTE ER) 24 hr tablet 250 mg  250 mg Oral BID Jese Arizmendi MD   250 mg at 07/12/24 0835    gabapentin (NEURONTIN) capsule 400 mg  400 mg Oral TID Jese Arizmendi MD   400 mg at 07/12/24 1333    nicotine (NICODERM CQ) 21 MG/24HR 24 hr patch 1 patch  1 patch Transdermal Daily Jese Arizmendi MD   1 patch at 07/12/24 0835    OLANZapine zydis (zyPREXA) ODT tab 15 mg  15 mg Oral BID Jese Arizmendi MD   15 mg at 07/12/24 0835    Vitamin D3 (CHOLECALCIFEROL) tablet 50 mcg  50 mcg Oral At Bedtime Maria Pennington APRN CNP   50 mcg at 07/12/24 0835          Allergies:     Allergies   Allergen Reactions    Paliperidone Other (See Comments)     Other reaction(s): Extrapyramidal Symptoms   Significant EPS    Significant EPS    Pork Allergy Other (See Comments)     Buddhist prohibition          Labs:   No results found for this or any previous visit (from the past 24 hour(s)).       Psychiatric Examination:     /69 (BP Location: Right arm)   Pulse 105   Temp 98.2  F (36.8  C) (Temporal)   Resp 18   Ht 1.88 m (6' 2\")   Wt 79.8 kg (176 lb)   SpO2 100%   BMI 22.60 kg/m    Weight is 176 lbs 0 oz  Body mass index is 22.6 kg/m .    Orthostatic Vitals         Most Recent      Standing Orthostatic BP " 97/68 07/12 0745    Standing Orthostatic Pulse (bpm) 124 07/12 0745           Appearance: dressed in hospital scrubs  Attitude:  somewhat cooperative  Eye Contact:  less tense  Mood:  less labile, irritable  Affect:  intensity is exaggerated and intensity is dramatic  Speech: less pressured speech  Psychomotor Behavior:  no evidence of tardive dyskinesia, dystonia, or tics and physical agitation  Throught Process: less disorganized and illogical  Associations:  loosening of associations present  Thought Content:  grandiose delusions  - less vocal about them  Insight:  limited  Judgement:  poor  Oriented to:  time, person, and place  Attention Span and Concentration:  poor  Recent and Remote Memory:  poor    Clinical Global Impressions    First: 7/4 7/1/2024      Most recent:            Precautions:     Behavioral Orders   Procedures    Assault precautions    Code 1 - Restrict to Unit    Elopement precautions    Fall precautions    Routine Programming     As clinically indicated    Status 15     Every 15 minutes.    Status Individual Observation     Patient SIO 2:1 status reviewed with team/RN.  Please also refer to RN/team documentation for add'l detail.  2:1     -SIO staff to monitor following which have contributed to patient being on SIO:  Pt was observed pushing on security staff in the ER with intent of leaving the hospital.  -Possible interventions SIO staff could use to support patient's treatment progress:  Offer redirections   -When following observed, team will review discontinuation of SIO:  Able to sign in voluntary and contract for safety     Order Specific Question:   CONTINUOUS 24 hours / day     Answer:   Other     Order Specific Question:   Specify distance     Answer:   10     Order Specific Question:   Indications for SIO     Answer:   Assault risk     Order Specific Question:   Indications for SIO     Answer:   Severe intrusiveness    Suicide precautions: Suicide Risk: HIGH; Clinical rationale to  override score: modification to the care environment     Patients on Suicide Precautions should have a Combination Diet ordered that includes a Diet selection(s) AND a Behavioral Tray selection for Safe Tray - with utensils, or Safe Tray - NO utensils       Order Specific Question:   Suicide Risk     Answer:   HIGH     Order Specific Question:   Clinical rationale to override score:     Answer:   modification to the care environment          DIagnoses:     Bipolar affective disorder, candie vs Schizoaffective disorder, bipolar type, manic.  Historical dx of stimulants use disorder.         Plan:     Patient is on a court hold, he had his first court hold 7/10/2024 and final hearing will be on 7/15. He continues to be manic, but sleeps more and appears to become more organized in his behavior. Will decrease SIO to 1:1. We started on Depakote ER 7/11/2024. No medication changes today 7/12/2024. Will continue to provide support and structure.    Total time spent was 36 minutes. Over 50% of times was spent counseling and coordination of care regarding coping skills, medication and discharge planning.

## 2024-07-12 NOTE — PLAN OF CARE
"  Problem: Psychotic Signs/Symptoms  Goal: Improved Behavioral Control (Psychotic Signs/Symptoms)  Outcome: Progressing   Goal Outcome Evaluation:    Plan of Care Reviewed With: patient      Lamine has been isolated and withdrawn to his room almost all shift. He asked for a prn early in the shift. Stayed in his room,fell asleep and was awakened for supper. He ate the majority of the food on his tray and returned to his room. He was cooperative with staff with moving to a different room on the unit. Informed this writer \" I'm going to be on my best behavior so I can be discharged after Court on Monday.\" Lamine was informed that he won't necessarily be discharged after court. Lamine stated \"I have to be discharged on Monday because I made a date with one of my girls.\"\"I didn't tell her I was in the hospital I told her I was in [facility].\" Lamine denies any SI/SIB/AH/VH. His speech remains loud and pressured. He continues to ambulate very quickly on the unit. He has needed frequently redirection from staff when out in the community areas of the unit. He continues on a 2:1 SIO for Severe Intrusiveness. Lamine has a no roommate order for disruption during the night. He continues on Suicide precautions. Nursing to continue to support current plan of care.    /79   Pulse 102   Temp 97.6  F (36.4  C) (Oral)   Resp 18   Ht 1.88 m (6' 2\")   Wt 79.8 kg (176 lb)   SpO2 98%   BMI 22.60 kg/m                  "

## 2024-07-12 NOTE — PLAN OF CARE
Goal Outcome Evaluation:      Pt remains on an SIO for safety. Pt a wake x1 briefly otherwise appeared to be a sleep at all other safety checks.  Pt slept 6.75 hours.
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
## 2024-07-12 NOTE — CONSULTS
SPIRITUAL HEALTH SERVICES  SPIRITUAL ASSESSMENT consult Note  [facility] ([facility]) 30n     REFERRAL SOURCE: routine consult    Spoke with Serafin's nurse and together we assessed that between Serafin declining to visit with a  at this time, and his current disorganization, that Monday would be a better time to check in after court.     PLAN: Will check in with staff on Monday to determine appropriateness of visit. Spiritual health services remains available for any follow-up or requests. For further visits please place spiritual health consults.    Dorinda Quintero, [facility]  Associate   Pager: [phone]

## 2024-07-12 NOTE — PLAN OF CARE
"Problem: Psychotic Signs/Symptoms  Goal: Improved Mood Symptoms (Psychotic Signs/Symptoms)  Outcome: Caitlyn Raman presents with a bright affect and elevated/euphoric mood. Speech has flights of ideas, thought process is disorganized. Pt denies HI/SI/AVH. Pt says, \"I am really happy! I might be manic! But I'm not mentally ill!\" Pt says he has an illness \"that they haven't discovered yet. They don't know the name of it.\" Pt says that he will stop taking his medications once he leaves the hospital, because he has an outpatient psychiatrist at [facility] who will give him \"the meds that work.\" I reminded pt that his medications here have been helpful and that the court may require that he continue to take these medications, which pt said he would consider, \"but I don't think that will happen.\" Pt believes that he is \"protecting\" the other patients of the unit, pt says, \"I've got everyone's back! Nobody is going to fuck with us here!\" Pt is restless, pacing quickly up and down the hallway. Pt continues to require redirection from SIO staff for intrusiveness. Pt spent the shift pacing, talking on the phone, watching TV, eating snacks, and socializing with peers. Appetite is good.     Pt reported left knee pain and received PRN Tylenol and an ice pack. Pt reported this was effective and no longer has pain.    Pt was running in the hallway, screaming, \"I need cocaine! Give me cocaine!\" Pt says that his family put him in the hospital and they are the reason he needs drugs. Pt says when he leaves the hospital, he will \"buy a big bag of cocaine.\" Pt says that his parents had him \"as an experiment.\" Pt reported feeling anxious. Pt received PRN hydroxyzine, but pt reports this was not helpful for anxiety.     Pt says that his family is dead to him. Pt rescinded his CLIFTON for his aunt, Nita, and filled out a new CLIFTON for his uncle, Erica.     Pt received PRN trazodone with HS meds. Pt is medication compliant but " continues to have difficulty maintaining behavioral control.

## 2024-07-12 NOTE — PLAN OF CARE
Team Note Due:  Friday    Assessment/Intervention/Current Symtoms and Care Coordination:  Chart review and met with team, discussed pt progress, symptomology, and response to treatment.  Discussed the discharge plan and any potential impediments to discharge.    Pt appears to have an increase in symptoms.  He asked for a new layer and I grayson to call his current lawayre and tell him. He didn;t want to do this.          Discharge Plan or Goal:  IRTS       Barriers to Discharge:  Pt is ill and does not have providers     Referral Status:  Too early  Pt has no established providers       Medical Assistance  FC calls unit to talk to pt about applying for MA but was told he was too clinically stable      Legal Status:  Court hold as of 3:51 on 24  Continuing hold order was received late in the day.    [facility]: [facility]  File Number: [id number]  Start and expiration date of commitment:       Final Hearin/15 @ 9:45, does not need provider testimony    Court  asked for DA. DA was sent 7/10/24.  Neha Humphries  Senior   [facility] Assessment/Intake  Office: [phone] Fax: [phone]  [email or web address], [email or web address] [place] [address]      Contacts:      HUSSEIN Kohler  [facility] Attorney: Victoria Christianson @ [phone]    CLIFTON for aunt.Aunt:Nita @ [phone])  Father:Subhash @ [phone]        Upcoming Meetings and Dates/Important Information and next steps:      Final Hearin/15 @ TBD

## 2024-07-13 PROCEDURE — 250N000013 HC RX MED GY IP 250 OP 250 PS 637: Performed by: CLINICAL NURSE SPECIALIST

## 2024-07-13 PROCEDURE — 124N000002 HC R&B MH UMMC

## 2024-07-13 PROCEDURE — 250N000013 HC RX MED GY IP 250 OP 250 PS 637: Performed by: PSYCHIATRY & NEUROLOGY

## 2024-07-13 PROCEDURE — 250N000013 HC RX MED GY IP 250 OP 250 PS 637: Performed by: EMERGENCY MEDICINE

## 2024-07-13 RX ADMIN — DIVALPROEX SODIUM 250 MG: 250 TABLET, EXTENDED RELEASE ORAL at 08:57

## 2024-07-13 RX ADMIN — NICOTINE POLACRILEX 4 MG: 2 LOZENGE ORAL at 17:47

## 2024-07-13 RX ADMIN — GABAPENTIN 400 MG: 400 CAPSULE ORAL at 14:49

## 2024-07-13 RX ADMIN — NICOTINE 1 PATCH: 21 PATCH, EXTENDED RELEASE TRANSDERMAL at 08:58

## 2024-07-13 RX ADMIN — HALOPERIDOL 5 MG: 5 TABLET ORAL at 20:00

## 2024-07-13 RX ADMIN — TRAZODONE HYDROCHLORIDE 50 MG: 50 TABLET ORAL at 18:14

## 2024-07-13 RX ADMIN — Medication 50 MCG: at 08:58

## 2024-07-13 RX ADMIN — DIPHENHYDRAMINE HYDROCHLORIDE 50 MG: 50 CAPSULE ORAL at 20:00

## 2024-07-13 RX ADMIN — ACETAMINOPHEN 650 MG: 325 TABLET, FILM COATED ORAL at 18:15

## 2024-07-13 RX ADMIN — HYDROXYZINE HYDROCHLORIDE 50 MG: 50 TABLET, FILM COATED ORAL at 18:14

## 2024-07-13 RX ADMIN — OLANZAPINE 15 MG: 10 TABLET, ORALLY DISINTEGRATING ORAL at 08:57

## 2024-07-13 RX ADMIN — DIVALPROEX SODIUM 250 MG: 250 TABLET, EXTENDED RELEASE ORAL at 18:15

## 2024-07-13 RX ADMIN — METHOCARBAMOL 500 MG: 500 TABLET ORAL at 16:42

## 2024-07-13 RX ADMIN — GABAPENTIN 400 MG: 400 CAPSULE ORAL at 08:57

## 2024-07-13 RX ADMIN — NICOTINE POLACRILEX 4 MG: 2 LOZENGE ORAL at 16:42

## 2024-07-13 RX ADMIN — OLANZAPINE 15 MG: 10 TABLET, ORALLY DISINTEGRATING ORAL at 18:14

## 2024-07-13 RX ADMIN — GABAPENTIN 400 MG: 400 CAPSULE ORAL at 18:14

## 2024-07-13 ASSESSMENT — ACTIVITIES OF DAILY LIVING (ADL)
ADLS_ACUITY_SCORE: 40
DRESS: SCRUBS (BEHAVIORAL HEALTH);INDEPENDENT
ADLS_ACUITY_SCORE: 50
ADLS_ACUITY_SCORE: 40
HYGIENE/GROOMING: INDEPENDENT;PROMPTS
ADLS_ACUITY_SCORE: 50
ADLS_ACUITY_SCORE: 40
DRESS: INDEPENDENT
ADLS_ACUITY_SCORE: 50
ADLS_ACUITY_SCORE: 40
ADLS_ACUITY_SCORE: 40
ORAL_HYGIENE: INDEPENDENT
ADLS_ACUITY_SCORE: 50
ADLS_ACUITY_SCORE: 50
ADLS_ACUITY_SCORE: 40
LAUNDRY: UNABLE TO COMPLETE
HYGIENE/GROOMING: INDEPENDENT
ADLS_ACUITY_SCORE: 40
ADLS_ACUITY_SCORE: 40
LAUNDRY: WITH SUPERVISION
ADLS_ACUITY_SCORE: 40
ADLS_ACUITY_SCORE: 40
ORAL_HYGIENE: INDEPENDENT;PROMPTS

## 2024-07-13 NOTE — PLAN OF CARE
Pt appeared to sleep for a total of 7 hours overnight. No PRN medications were administered and no concerns were noted.     Problem: Sleep Disturbance  Goal: Adequate Sleep/Rest  Outcome: Progressing
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
## 2024-07-14 PROCEDURE — 124N000002 HC R&B MH UMMC

## 2024-07-14 PROCEDURE — 250N000013 HC RX MED GY IP 250 OP 250 PS 637: Performed by: EMERGENCY MEDICINE

## 2024-07-14 PROCEDURE — 250N000013 HC RX MED GY IP 250 OP 250 PS 637: Performed by: CLINICAL NURSE SPECIALIST

## 2024-07-14 PROCEDURE — 250N000013 HC RX MED GY IP 250 OP 250 PS 637: Performed by: PSYCHIATRY & NEUROLOGY

## 2024-07-14 RX ADMIN — DIVALPROEX SODIUM 250 MG: 250 TABLET, EXTENDED RELEASE ORAL at 18:01

## 2024-07-14 RX ADMIN — METHOCARBAMOL 500 MG: 500 TABLET ORAL at 16:22

## 2024-07-14 RX ADMIN — Medication 50 MCG: at 08:02

## 2024-07-14 RX ADMIN — GABAPENTIN 400 MG: 400 CAPSULE ORAL at 14:04

## 2024-07-14 RX ADMIN — NICOTINE POLACRILEX 4 MG: 2 LOZENGE ORAL at 14:04

## 2024-07-14 RX ADMIN — NICOTINE 1 PATCH: 21 PATCH, EXTENDED RELEASE TRANSDERMAL at 08:03

## 2024-07-14 RX ADMIN — GABAPENTIN 400 MG: 400 CAPSULE ORAL at 18:01

## 2024-07-14 RX ADMIN — HYDROXYZINE HYDROCHLORIDE 50 MG: 50 TABLET, FILM COATED ORAL at 16:22

## 2024-07-14 RX ADMIN — GABAPENTIN 400 MG: 400 CAPSULE ORAL at 08:02

## 2024-07-14 RX ADMIN — DIVALPROEX SODIUM 250 MG: 250 TABLET, EXTENDED RELEASE ORAL at 08:02

## 2024-07-14 RX ADMIN — OLANZAPINE 15 MG: 10 TABLET, ORALLY DISINTEGRATING ORAL at 08:02

## 2024-07-14 RX ADMIN — OLANZAPINE 15 MG: 10 TABLET, ORALLY DISINTEGRATING ORAL at 18:01

## 2024-07-14 ASSESSMENT — ACTIVITIES OF DAILY LIVING (ADL)
ORAL_HYGIENE: INDEPENDENT
ADLS_ACUITY_SCORE: 40
ADLS_ACUITY_SCORE: 40
ORAL_HYGIENE: INDEPENDENT;PROMPTS
ADLS_ACUITY_SCORE: 60
ADLS_ACUITY_SCORE: 40
ADLS_ACUITY_SCORE: 60
DRESS: SCRUBS (BEHAVIORAL HEALTH);INDEPENDENT
ADLS_ACUITY_SCORE: 40
DRESS: SCRUBS (BEHAVIORAL HEALTH);INDEPENDENT;PROMPTS
ADLS_ACUITY_SCORE: 40
LAUNDRY: WITH SUPERVISION
ADLS_ACUITY_SCORE: 40
HYGIENE/GROOMING: INDEPENDENT;PROMPTS
ADLS_ACUITY_SCORE: 40
ADLS_ACUITY_SCORE: 60
HYGIENE/GROOMING: INDEPENDENT
ADLS_ACUITY_SCORE: 40
ADLS_ACUITY_SCORE: 60
ADLS_ACUITY_SCORE: 60
ADLS_ACUITY_SCORE: 40
ADLS_ACUITY_SCORE: 60
ADLS_ACUITY_SCORE: 60
ADLS_ACUITY_SCORE: 40
ADLS_ACUITY_SCORE: 40
LAUNDRY: UNABLE TO COMPLETE
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
## 2024-07-15 PROCEDURE — 250N000013 HC RX MED GY IP 250 OP 250 PS 637: Performed by: CLINICAL NURSE SPECIALIST

## 2024-07-15 PROCEDURE — 250N000013 HC RX MED GY IP 250 OP 250 PS 637: Performed by: NURSE PRACTITIONER

## 2024-07-15 PROCEDURE — 250N000013 HC RX MED GY IP 250 OP 250 PS 637: Performed by: PSYCHIATRY & NEUROLOGY

## 2024-07-15 PROCEDURE — 124N000002 HC R&B MH UMMC

## 2024-07-15 PROCEDURE — G0177 OPPS/PHP; TRAIN & EDUC SERV: HCPCS

## 2024-07-15 PROCEDURE — 99232 SBSQ HOSP IP/OBS MODERATE 35: CPT | Performed by: NURSE PRACTITIONER

## 2024-07-15 RX ORDER — DIVALPROEX SODIUM 500 MG/1
500 TABLET, EXTENDED RELEASE ORAL 2 TIMES DAILY
Status: DISCONTINUED | OUTPATIENT
Start: 2024-07-15 | End: 2024-07-22

## 2024-07-15 RX ADMIN — GABAPENTIN 400 MG: 400 CAPSULE ORAL at 14:20

## 2024-07-15 RX ADMIN — OLANZAPINE 15 MG: 10 TABLET, ORALLY DISINTEGRATING ORAL at 20:18

## 2024-07-15 RX ADMIN — GABAPENTIN 400 MG: 400 CAPSULE ORAL at 20:18

## 2024-07-15 RX ADMIN — Medication 50 MCG: at 09:14

## 2024-07-15 RX ADMIN — OLANZAPINE 15 MG: 10 TABLET, ORALLY DISINTEGRATING ORAL at 09:14

## 2024-07-15 RX ADMIN — OLANZAPINE 10 MG: 10 TABLET, FILM COATED ORAL at 14:15

## 2024-07-15 RX ADMIN — NICOTINE 1 PATCH: 21 PATCH, EXTENDED RELEASE TRANSDERMAL at 09:21

## 2024-07-15 RX ADMIN — DIVALPROEX SODIUM 500 MG: 500 TABLET, FILM COATED, EXTENDED RELEASE ORAL at 20:18

## 2024-07-15 RX ADMIN — DIVALPROEX SODIUM 500 MG: 500 TABLET, FILM COATED, EXTENDED RELEASE ORAL at 09:16

## 2024-07-15 RX ADMIN — GABAPENTIN 400 MG: 400 CAPSULE ORAL at 09:14

## 2024-07-15 RX ADMIN — NICOTINE POLACRILEX 4 MG: 2 LOZENGE ORAL at 09:21

## 2024-07-15 ASSESSMENT — ACTIVITIES OF DAILY LIVING (ADL)
ADLS_ACUITY_SCORE: 60
LAUNDRY: WITH SUPERVISION
ADLS_ACUITY_SCORE: 60
HYGIENE/GROOMING: INDEPENDENT;PROMPTS
ADLS_ACUITY_SCORE: 50
ADLS_ACUITY_SCORE: 50
ADLS_ACUITY_SCORE: 60
ADLS_ACUITY_SCORE: 50
ADLS_ACUITY_SCORE: 60
ADLS_ACUITY_SCORE: 50
ORAL_HYGIENE: INDEPENDENT;PROMPTS
ADLS_ACUITY_SCORE: 50
ADLS_ACUITY_SCORE: 60
ADLS_ACUITY_SCORE: 60
ADLS_ACUITY_SCORE: 50
ADLS_ACUITY_SCORE: 50
DRESS: SCRUBS (BEHAVIORAL HEALTH)
ADLS_ACUITY_SCORE: 50
ADLS_ACUITY_SCORE: 60
ADLS_ACUITY_SCORE: 60
ADLS_ACUITY_SCORE: 50

## 2024-07-15 NOTE — PLAN OF CARE
"  Rehab Group    Start time: 1015  End time: 1200  Patient time total: 65 minutes    came in and out of group session    #9 attended   Group Type: occupational therapy   Group Topic Covered: cognitive activities, coping skills, healthy leisure time, and problem solving   Group Session Detail: OT: Education on healthy activity engagement and healthy leisure engagement with creative hands-on endeavor (yarn baskets and fuzzy posters) to increase concentration, focus, attention to task/detail, decision making, problem solving, frustration tolerance, task follow through, coping with stress, healthy leisure engagement, creative expression, and social engagement    Patient Response/Contribution:  left group on several occasions, distracted , disorganized, nonsensical verbalizations, interrupted others/ frequently, disruptive to the group, and Redirectable; Restless   Patient Detail: Pt initially in group room and required redirection to sit down and cease talking; pt was hyperverbal and making many comments that were out of context (\"I forgive my Mom and Aunt. I forgive them\" and \"I love the streets. The streets love me.\"); pt redirectable. Throughout group pt appeared restless and he left group and returned multiple times. Pt returned to group many time dipping with water as if he had put some in his hair or on his face. Therapist able to get pt engaged in a simple hands on creative endeavor and pt appeared less restless when goal-directed in task. While goal-directed pt appeared less restless as he attended to task but still required intermittent redirection to lower the volume of his voice and/or cease verbalizations. Pt worked in a quick and messy manner to complete task. SIO present for duration.       Train & Education Service Per Session 45 + Minutes () OT Group code    Patient Active Problem List   Diagnosis    Anxiety    Adjustment disorder with anxious mood    Polysubstance abuse (H)    Manic " behavior (H)    Psychosis (H)    Bipolar affective disorder, current episode manic (H)    Schizoaffective disorder, bipolar type (H)    Suicidal ideation    Agitation    Itzel (H)

## 2024-07-15 NOTE — PROGRESS NOTES
"[facility], Mobile   Psychiatric Progress Note        Interim History:   Team meeting report: The patient's care was discussed with the treatment team during the daily team meeting and/or staff's chart notes were reviewed.  Staff report patient has been visible in the milieu and pacing in the hallway.  He was isolated to his room in the morning.  When out of the milieu, the patient was extremely loud and disorganized.  He was intrusive with others.  Needed frequent redirections.  Speech was clear.  Mood is labile.  In the evening, the patient was calm and appropriate.  That he had bright affect.  Continues to be on SIO for severe intrusiveness.  Ate 100% of his meals.  Slept well.  No behavioral issues.    Met with patient.  He is walking briskly in the hallway with headphones on.  Mood is good.  States at baseline he has high energy.  The patient knows he is in the hospital.  States that his that his mom brought him to the hospital, \"they abandoned me\".  The patient did not want listen to reasoning as to why they might have brought him to the hospital.  He is angry at them.  He is reporting good sleep and appetite.  He denied auditory and visual hallucinations, paranoia, delusions.  Denies feeling depressed and anxious.  Denies suicidal or homicidal ideation.  Discussed that the court.  He has a final hearing today.  He thinks that he will be discharged today.  He does not have a place to go.    Per social work, the patient does not have an insurance.  Is not clear if his family will take him back.         Medications:     Current Facility-Administered Medications   Medication Dose Route Frequency Provider Last Rate Last Admin    divalproex sodium extended-release (DEPAKOTE ER) 24 hr tablet 250 mg  250 mg Oral BID Jese Arizmendi MD   250 mg at 07/14/24 1801    gabapentin (NEURONTIN) capsule 400 mg  400 mg Oral TID Jese Arizmendi MD   400 mg at 07/14/24 1801    nicotine " (NICODERM CQ) 21 MG/24HR 24 hr patch 1 patch  1 patch Transdermal Daily Jese Arizmendi MD   1 patch at 07/14/24 0803    OLANZapine zydis (zyPREXA) ODT tab 15 mg  15 mg Oral BID Jese Arizmendi MD   15 mg at 07/14/24 1801    Vitamin D3 (CHOLECALCIFEROL) tablet 50 mcg  50 mcg Oral At Bedtime Maria Pennington APRN CNP   50 mcg at 07/14/24 0802            Allergies:     Allergies   Allergen Reactions    Paliperidone Other (See Comments)     Other reaction(s): Extrapyramidal Symptoms   Significant EPS    Significant EPS    Pork Allergy Other (See Comments)     Worship prohibition            Labs:     Recent Results (from the past 672 hour(s))   Urine Drug Screen Panel    Collection Time: 06/21/24  8:50 AM   Result Value Ref Range    Amphetamines Urine Screen Negative Screen Negative    Barbituates Urine Screen Negative Screen Negative    Benzodiazepine Urine Screen Negative Screen Negative    Cannabinoids Urine Screen Negative Screen Negative    Cocaine Urine Screen Negative Screen Negative    Fentanyl Qual Urine Screen Negative Screen Negative    Opiates Urine Screen Negative Screen Negative    PCP Urine Screen Negative Screen Negative   Urine Drug Screen Panel    Collection Time: 06/22/24  8:31 PM   Result Value Ref Range    Amphetamines Urine Screen Negative Screen Negative    Barbituates Urine Screen Negative Screen Negative    Benzodiazepine Urine Screen Negative Screen Negative    Cannabinoids Urine Screen Negative Screen Negative    Cocaine Urine Screen Negative Screen Negative    Fentanyl Qual Urine Screen Negative Screen Negative    Opiates Urine Screen Negative Screen Negative    PCP Urine Screen Negative Screen Negative   Urine Drug Screen Panel    Collection Time: 06/25/24 11:36 PM   Result Value Ref Range    Amphetamines Urine Screen Negative Screen Negative    Barbituates Urine Screen Negative Screen Negative    Benzodiazepine Urine Screen Negative Screen Negative    Cannabinoids Urine  Screen Negative Screen Negative    Cocaine Urine Screen Negative Screen Negative    Fentanyl Qual Urine Screen Negative Screen Negative    Opiates Urine Screen Negative Screen Negative    PCP Urine Screen Negative Screen Negative   Comprehensive metabolic panel    Collection Time: 06/26/24  9:27 PM   Result Value Ref Range    Sodium 136 135 - 145 mmol/L    Potassium 3.9 3.4 - 5.3 mmol/L    Carbon Dioxide (CO2) 29 22 - 29 mmol/L    Anion Gap 7 7 - 15 mmol/L    Urea Nitrogen 12.1 6.0 - 20.0 mg/dL    Creatinine 0.86 0.67 - 1.17 mg/dL    GFR Estimate >90 >60 mL/min/1.73m2    Calcium 9.3 8.6 - 10.0 mg/dL    Chloride 100 98 - 107 mmol/L    Glucose 110 (H) 70 - 99 mg/dL    Alkaline Phosphatase 75 40 - 150 U/L    AST 31 0 - 45 U/L    ALT 37 0 - 70 U/L    Protein Total 6.9 6.4 - 8.3 g/dL    Albumin 4.3 3.5 - 5.2 g/dL    Bilirubin Total 0.4 <=1.2 mg/dL   CK total    Collection Time: 06/26/24  9:27 PM   Result Value Ref Range     39 - 308 U/L   CBC with platelets and differential    Collection Time: 06/26/24  9:27 PM   Result Value Ref Range    WBC Count 7.5 4.0 - 11.0 10e3/uL    RBC Count 4.68 4.40 - 5.90 10e6/uL    Hemoglobin 14.3 13.3 - 17.7 g/dL    Hematocrit 42.4 40.0 - 53.0 %    MCV 91 78 - 100 fL    MCH 30.6 26.5 - 33.0 pg    MCHC 33.7 31.5 - 36.5 g/dL    RDW 13.0 10.0 - 15.0 %    Platelet Count 257 150 - 450 10e3/uL    % Neutrophils 61 %    % Lymphocytes 26 %    % Monocytes 11 %    % Eosinophils 2 %    % Basophils 0 %    % Immature Granulocytes 0 %    NRBCs per 100 WBC 0 <1 /100    Absolute Neutrophils 4.6 1.6 - 8.3 10e3/uL    Absolute Lymphocytes 1.9 0.8 - 5.3 10e3/uL    Absolute Monocytes 0.8 0.0 - 1.3 10e3/uL    Absolute Eosinophils 0.2 0.0 - 0.7 10e3/uL    Absolute Basophils 0.0 0.0 - 0.2 10e3/uL    Absolute Immature Granulocytes 0.0 <=0.4 10e3/uL    Absolute NRBCs 0.0 10e3/uL   Urine Drug Screen Panel    Collection Time: 06/26/24  9:29 PM   Result Value Ref Range    Amphetamines Urine Screen Negative Screen  Negative    Barbituates Urine Screen Negative Screen Negative    Benzodiazepine Urine Screen Negative Screen Negative    Cannabinoids Urine Screen Negative Screen Negative    Cocaine Urine Screen Negative Screen Negative    Fentanyl Qual Urine Screen Negative Screen Negative    Opiates Urine Screen Negative Screen Negative    PCP Urine Screen Negative Screen Negative   Symptomatic COVID-19 Virus (Coronavirus) by PCR Nasopharyngeal    Collection Time: 06/27/24  6:52 AM    Specimen: Nasopharyngeal; Swab   Result Value Ref Range    SARS CoV2 PCR Negative Negative   Lipid Profile    Collection Time: 06/29/24  7:55 AM   Result Value Ref Range    Cholesterol 209 (H) <200 mg/dL    Triglycerides 281 (H) <150 mg/dL    Direct Measure HDL 70 >=40 mg/dL    LDL Cholesterol Calculated 83 <=100 mg/dL    Non HDL Cholesterol 139 (H) <130 mg/dL   Hemoglobin A1c    Collection Time: 06/29/24  7:55 AM   Result Value Ref Range    Hemoglobin A1C 5.4 <5.7 %            Precautions:     Behavioral Orders   Procedures    Assault precautions    Code 1 - Restrict to Unit    Elopement precautions    Fall precautions    Routine Programming     As clinically indicated    Status 15     Every 15 minutes.    Status Individual Observation     Patient SIO 1:1 status reviewed with team/RN.  Please also refer to RN/team documentation for add'l detail.  2:1     -SIO staff to monitor following which have contributed to patient being on SIO:  Pt was observed pushing on security staff in the ER with intent of leaving the hospital.  -Possible interventions SIO staff could use to support patient's treatment progress:  Offer redirections   -When following observed, team will review discontinuation of SIO:  Able to sign in voluntary and contract for safety     Order Specific Question:   CONTINUOUS 24 hours / day     Answer:   Other     Order Specific Question:   Specify distance     Answer:   10     Order Specific Question:   Indications for SIO     Answer:    Assault risk     Order Specific Question:   Indications for SIO     Answer:   Severe intrusiveness    Suicide precautions: Suicide Risk: HIGH; Clinical rationale to override score: modification to the care environment     Patients on Suicide Precautions should have a Combination Diet ordered that includes a Diet selection(s) AND a Behavioral Tray selection for Safe Tray - with utensils, or Safe Tray - NO utensils       Order Specific Question:   Suicide Risk     Answer:   HIGH     Order Specific Question:   Clinical rationale to override score:     Answer:   modification to the care environment            Psychiatric Examination:   Temp: 98.7  F (37.1  C) Temp src: Oral BP: 122/87 Pulse: 88     SpO2: 100 % O2 Device: None (Room air)    Weight is 177 lbs 7.52 oz  Body mass index is 22.79 kg/m .    Appearance: awake, alert and adequately groomed  Attitude:  cooperative  Eye Contact:  intense  Mood:  good  Affect:  intensity is exaggerated and intensity is heightened  Speech:  pressured speech  Psychomotor Behavior:  no evidence of tardive dyskinesia, dystonia, or tics and physical agitation  Throught Process:  linear, disorganized, and improving  Associations:  no loose associations  Thought Content:  no evidence of suicidal ideation or homicidal ideation, no auditory hallucinations present, and no visual hallucinations present  Insight:  fair  Judgement:  fair  Oriented to:  time, person, and place  Attention Span and Concentration:  fair  Recent and Remote Memory:  fair         Precautions:     Behavioral Orders   Procedures    Assault precautions    Code 1 - Restrict to Unit    Elopement precautions    Fall precautions    Routine Programming     As clinically indicated    Status 15     Every 15 minutes.    Status Individual Observation     Patient SIO 1:1 status reviewed with team/RN.  Please also refer to RN/team documentation for add'l detail.  2:1     -SIO staff to monitor following which have contributed to  patient being on SIO:  Pt was observed pushing on security staff in the ER with intent of leaving the hospital.  -Possible interventions SIO staff could use to support patient's treatment progress:  Offer redirections   -When following observed, team will review discontinuation of SIO:  Able to sign in voluntary and contract for safety     Order Specific Question:   CONTINUOUS 24 hours / day     Answer:   Other     Order Specific Question:   Specify distance     Answer:   10     Order Specific Question:   Indications for SIO     Answer:   Assault risk     Order Specific Question:   Indications for SIO     Answer:   Severe intrusiveness    Suicide precautions: Suicide Risk: HIGH; Clinical rationale to override score: modification to the care environment     Patients on Suicide Precautions should have a Combination Diet ordered that includes a Diet selection(s) AND a Behavioral Tray selection for Safe Tray - with utensils, or Safe Tray - NO utensils       Order Specific Question:   Suicide Risk     Answer:   HIGH     Order Specific Question:   Clinical rationale to override score:     Answer:   modification to the care environment          DIagnoses:   Schizoaffective disorder, bipolar type, current episode manic, severe, with psychosis  Stimulant use disorder, per his disorder, per history  Noncompliance noncompliance with treatment       Plan:   Medications:    --Increase Depakote to 500, bid, for mood stabilization on 7/15  --Continue gabapentin, 400 mg 3 times a day for anxiety and agitation  --Nicotine patch, 21 mg every morning  -- Zyprexa, 50 mg twice a day for agitation and psychosis  -- Vitamin D, 50 mcg at bedtime  -- Additional medications include B-52, Latuda, Zyprexa, trazodone    Medical:  --Internal medicine to follow up for medical problems     --Lab work was reviewed.  Abnormal results includes cholesterol 209, glucose 110, non-, triglyceride 281.  The rest of the results were normal.  U-Tox  was negative.      --SIO for severe intrusiveness  --Cheeking precautions  --Care was coordinated with the treatment team.   --The patient was consulted on nature of illness and treatment options.      Disposition Plan   Reason for ongoing admission: is unable to care for self due to severe psychosis or candie  Discharge location: [facility] facility  Discharge Medications: not ordered  Follow-up Appointments: not scheduled    Legal Status:  Court hold as of 3:51 on 24  Continuing hold order was received late in the day.     County: [facility]  File Number: [id number]  Start and expiration date of commitment:         Final Hearin/15 @ 9:45, does not need provider testimony     Court  asked for DA. DA was sent 7/10/24.  Neha Humphries  Senior   [facility] Behavioral Health Assessment/Intake  Office: [phone] Fax: [phone]  [email or web address], [address] [place] [address]       Contacts:        HUSSEIN Kohler  [facility] Attorney: Victoria Sammy @ [phone]     CLIFTON for aunt.Aunt:Nita @ [phone])  Father:Subhash @ [phone]  Discharge will be granted once symptoms improved.    Kaden RIVERA, CNP    This note was created with the help of Dragon dictation system. All grammatical/typing errors or context distortion are unintentional and inherent to software.

## 2024-07-15 NOTE — PLAN OF CARE
Team Note Due:  Friday    Assessment/Intervention/Current Symtoms and Care Coordination:  Chart review and met with team, discussed pt progress, symptomology, and response to treatment.  Discussed the discharge plan and any potential impediments to discharge.    Pt has final hearing.    PT was observed on the fllor in front of the doors.  He is on a 1:1.    He asked to see me.  He said he wanted housing.  I said we can arrange for treatment but not housing.  HE said he would like to go to [facility] in [place] and confirms that he left there after one day.          Discharge Plan or Goal:  IRTS  OR  HARVEY treatment       Barriers to Discharge:  Pt is ill and does not have providers     Referral Status:  Too early  Pt has no established providers       Medical Assistance  FC calls unit to talk to pt about applying for MA but was told he was too clinically stable      Legal Status:  Court hold as of 3:51 on 24  Continuing hold order was received late in the day.    [facility]: [facility]  File Number: [id number]  Start and expiration date of commitment:       Final Hearin/15 @ 9:45, does not need provider testimony    Court  asked for DA. DA was sent 7/10/24.  Neha Humphries  Senior   [facility] Behavioral Health Assessment/Intake  Office: [phone] Fax: [phone]  [email or web address], [address], [address] [address]      Contacts:      HUSSEIN Kohler  [facility] Attorney: Victoria Christianson @ [phone]    CLIFTON for aunt.Aunt:Nita @ [phone])  Father:Subhash @ [phone]        Upcoming Meetings and Dates/Important Information and next steps:      Final Hearin/15 @ TBNOÉ

## 2024-07-15 NOTE — PLAN OF CARE
BEH IP Unit Acuity Rating Score (UARS)  Patient is given one point for every criteria they meet.     CRITERIA SCORING   On a 72 hour hold, court hold, committed, stay of commitment, or revocation. 1    Patient LOS on BEH unit exceeds 20 days. 0  LOS: 18   Patient under guardianship, 55+, otherwise medically complex, or under age 11. 0   Suicide ideation without relief of precipitating factors. 0   Current plan for suicide. 0   Current plan for homicide. 0   Imminent risk or actual attempt to seriously harm another without relief of factors precipitating the attempt. 0   Severe dysfunction in daily living (ex: complete neglect for self care, extreme disruption in vegetative function, extreme deterioration in social interactions). 1   Recent (last 7 days) or current physical aggression in the ED or on unit. 0   Restraints or seclusion episode in past 72 hours. 0   Recent (last 7 days) or current verbal aggression, agitation, yelling, etc., while in the ED or unit. 0      Active psychosis. 1   Need for constant or near constant redirection (from leaving, from others, etc).  1   Intrusive or disruptive behaviors. 1   Patient requires 3 or more hours of individualized nursing care per 8-hour shift (i.e. for ADLs, meds, therapeutic interventions). 1   TOTAL 6

## 2024-07-15 NOTE — PLAN OF CARE
Pt appeared to sleep for a total of 6.5 hours overnight. No PRN medications were administered and no concerns were noted.     Problem: Sleep Disturbance  Goal: Adequate Sleep/Rest  Outcome: Progressing

## 2024-07-15 NOTE — PLAN OF CARE
Problem: Psychotic Signs/Symptoms  Goal: Improved Behavioral Control (Psychotic Signs/Symptoms)  Outcome: Progressing    Pt has been in and out of his room and milieu. Minimally loud in the lounge. Improved intrusiveness. Pt had court today and handled it well. A little anxious before and after court but did not ask to be discharged. Realized he would not be going home today and did not get upset. Agreed that today's court is a positive step towards discharge. Stated that music and yoga/stretching have been good coping skills. Naps throughout the day. Appetite is good. No physical complaints. No PRN's asked for or given. Denies SI/SIB/HI.  No unusual comments around writer.    1400: Pt came out of his room agitated after waking up. Walked into the lounge saying he had a nightmare and needed to see his doctor. Said he did not see his doctor today and needed to see her to be discharged. He proceeded to take his shirt off and sat down in the lounge. He said he would put his shirt on when he saw the doctor. He refused to take any PRN medication at that time. He then moved to the exit door to the unit and laid down with his shirt off. Staff eventually talked him into moving and putting his shirt on, pointing out he was making positive progress, having had his SIO drop from a 2:1 to a 1:1. He eventually Took Zyprexa, 10 mg, po, prn and scheduled Gabapentin, 400 mg at 1415. He is currently quiet in his room at 1449.

## 2024-07-16 PROCEDURE — 250N000013 HC RX MED GY IP 250 OP 250 PS 637: Performed by: CLINICAL NURSE SPECIALIST

## 2024-07-16 PROCEDURE — 250N000013 HC RX MED GY IP 250 OP 250 PS 637: Performed by: NURSE PRACTITIONER

## 2024-07-16 PROCEDURE — 99232 SBSQ HOSP IP/OBS MODERATE 35: CPT | Performed by: NURSE PRACTITIONER

## 2024-07-16 PROCEDURE — 124N000002 HC R&B MH UMMC

## 2024-07-16 PROCEDURE — 250N000013 HC RX MED GY IP 250 OP 250 PS 637: Performed by: PSYCHIATRY & NEUROLOGY

## 2024-07-16 RX ORDER — HALOPERIDOL 5 MG/1
5 TABLET ORAL 2 TIMES DAILY
Status: DISCONTINUED | OUTPATIENT
Start: 2024-07-16 | End: 2024-08-09 | Stop reason: HOSPADM

## 2024-07-16 RX ADMIN — GABAPENTIN 400 MG: 400 CAPSULE ORAL at 18:39

## 2024-07-16 RX ADMIN — HALOPERIDOL 5 MG: 5 TABLET ORAL at 12:08

## 2024-07-16 RX ADMIN — DIVALPROEX SODIUM 500 MG: 500 TABLET, FILM COATED, EXTENDED RELEASE ORAL at 18:39

## 2024-07-16 RX ADMIN — DIVALPROEX SODIUM 500 MG: 500 TABLET, FILM COATED, EXTENDED RELEASE ORAL at 08:40

## 2024-07-16 RX ADMIN — GABAPENTIN 400 MG: 400 CAPSULE ORAL at 16:20

## 2024-07-16 RX ADMIN — OLANZAPINE 15 MG: 10 TABLET, ORALLY DISINTEGRATING ORAL at 08:40

## 2024-07-16 RX ADMIN — HALOPERIDOL 5 MG: 5 TABLET ORAL at 18:39

## 2024-07-16 RX ADMIN — GABAPENTIN 400 MG: 400 CAPSULE ORAL at 08:40

## 2024-07-16 RX ADMIN — Medication 50 MCG: at 12:08

## 2024-07-16 RX ADMIN — OLANZAPINE 15 MG: 10 TABLET, ORALLY DISINTEGRATING ORAL at 18:39

## 2024-07-16 ASSESSMENT — ACTIVITIES OF DAILY LIVING (ADL)
ADLS_ACUITY_SCORE: 50
DRESS: SCRUBS (BEHAVIORAL HEALTH)
ADLS_ACUITY_SCORE: 50
ADLS_ACUITY_SCORE: 40
ORAL_HYGIENE: INDEPENDENT
ADLS_ACUITY_SCORE: 40
ADLS_ACUITY_SCORE: 50
ADLS_ACUITY_SCORE: 40
ADLS_ACUITY_SCORE: 50
HYGIENE/GROOMING: INDEPENDENT;PROMPTS
ADLS_ACUITY_SCORE: 50
HYGIENE/GROOMING: INDEPENDENT
LAUNDRY: WITH SUPERVISION
LAUNDRY: WITH SUPERVISION
ADLS_ACUITY_SCORE: 50
ADLS_ACUITY_SCORE: 40
ADLS_ACUITY_SCORE: 50
ADLS_ACUITY_SCORE: 50
DRESS: SCRUBS (BEHAVIORAL HEALTH)
ADLS_ACUITY_SCORE: 50
ADLS_ACUITY_SCORE: 50
ORAL_HYGIENE: INDEPENDENT;PROMPTS
ADLS_ACUITY_SCORE: 40
ADLS_ACUITY_SCORE: 50
ADLS_ACUITY_SCORE: 40
ADLS_ACUITY_SCORE: 50

## 2024-07-16 NOTE — PLAN OF CARE
Team Note Due:  Friday    Assessment/Intervention/Current Symtoms and Care Coordination:  Chart review and met with team, discussed pt progress, symptomology, and response to treatment.  Discussed the discharge plan and any potential impediments to discharge.    Pt's behavior has been very disruptive today.    .  Pt Lamine miramontes mr [phone] has active medical assistance coverage.          Discharge Plan or Goal:  IRTS  OR  HARVEY treatment       Barriers to Discharge:  Pt is ill and does not have providers     Referral Status:  Too early  Pt has no established providers       Medical Assistance  FC calls unit to talk to pt about applying for MA but was told he was too clinically stable  7/17/24 - .  Pt Lamine miramontes mr [phone] has active coverage.      Legal Status:  Court hold as of 3:51 on 7/5/24  Final hearing completed - waiting for commitment and [place] order    7/12/24  Continuing hold order was received late in the day.    County: [facility]  File Number: [id number]  Start and expiration date of commitment:           Court  asked for DA. DA was sent 7/10/24.  Neha Humphries  Senior   [facility] Behavioral Health Assessment/Intake  Office: [phone] Fax: [phone]  [email or web address], [address], [address]  [address] [address]      Contacts:      HUSSEIN Kohler  [facility] Attorney: Victoria Christianson @ [phone]    CLIFTON for aunt.Aunt:Nita @ [phone])  Father:Subhash @ [phone]        Upcoming Meetings and Dates/Important Information and next steps:    Waiting for commitment and [place] order

## 2024-07-16 NOTE — PROGRESS NOTES
"[facility], [place]   Psychiatric Progress Note        Interim History:   Team meeting report: The patient's care was discussed with the treatment team during the daily team meeting and/or staff's chart notes were reviewed.  Staff reports the patient is not violent.  He spends a lot of time napping and as soon as he gets up, he is and agitated, intrusive, and loud.  He went to court and felt that it went well.  In the afternoon, the patient woke up from a nap and walked into the lounge saying that he had a nightmare.  Demanded to see his doctor stating that he has not seen one today.  He took off his shirt and laid down in front of the door.  Did not respond well to redirection.  He took Zyprexa as needed.  In the evening, the patient remained in the evening, the patient remained the same, napping on and off and getting agitated as soon as he wakes up.  Slept through the night.    Met with patient.  He is napping in his room.  Initially very excited to see me.  Visual expression is quite exaggerated.  He wanted to have this provider.  The patient states that the court went well.  The court told him that it is up to the provider to discharge him.  He feels ready to go today.  He does not have a place to go but insist on discharging \"because I have a date\".  \"I will come back right after\".  Informed the patient that he is not ready to discharge since he is still quite hyperverbal.  The patient jumped out of bed became really agitated, started yelling stating that that this provider does not know him to see if he is doing well or not.  The patient left the room.  He came back and asked this provider to talk to his anti as if he can go there.  Again reiterated that he is not ready to discharge.  The patient continued to yell and walk briskly in the hallway.  He took off his shirt couple of times.  The patient was barely redirectable. Briefly mentioned [place] security, wanting to have a " roommate when he goes home, something about his father and many other unrelated subjects.     Start Haldol.          Medications:     Current Facility-Administered Medications   Medication Dose Route Frequency Provider Last Rate Last Admin    divalproex sodium extended-release (DEPAKOTE ER) 24 hr tablet 500 mg  500 mg Oral BID Kaden Daniels APRN CNP   500 mg at 07/15/24 2018    gabapentin (NEURONTIN) capsule 400 mg  400 mg Oral TID Jese Arizmendi MD   400 mg at 07/15/24 2018    haloperidol (HALDOL) tablet 5 mg  5 mg Oral BID Kaden Daniels APRN CNP        nicotine (NICODERM CQ) 21 MG/24HR 24 hr patch 1 patch  1 patch Transdermal Daily Jese Arizmendi MD   1 patch at 07/15/24 0921    OLANZapine zydis (zyPREXA) ODT tab 15 mg  15 mg Oral BID Jese Arizmendi MD   15 mg at 07/15/24 2018    Vitamin D3 (CHOLECALCIFEROL) tablet 50 mcg  50 mcg Oral At Bedtime Maria Pennington APRN CNP   50 mcg at 07/15/24 0914            Allergies:     Allergies   Allergen Reactions    Paliperidone Other (See Comments)     Other reaction(s): Extrapyramidal Symptoms   Significant EPS    Significant EPS    Pork Allergy Other (See Comments)     Zoroastrian prohibition            Labs:     Recent Results (from the past 672 hour(s))   Urine Drug Screen Panel    Collection Time: 06/21/24  8:50 AM   Result Value Ref Range    Amphetamines Urine Screen Negative Screen Negative    Barbituates Urine Screen Negative Screen Negative    Benzodiazepine Urine Screen Negative Screen Negative    Cannabinoids Urine Screen Negative Screen Negative    Cocaine Urine Screen Negative Screen Negative    Fentanyl Qual Urine Screen Negative Screen Negative    Opiates Urine Screen Negative Screen Negative    PCP Urine Screen Negative Screen Negative   Urine Drug Screen Panel    Collection Time: 06/22/24  8:31 PM   Result Value Ref Range    Amphetamines Urine Screen Negative Screen Negative    Barbituates Urine  Screen Negative Screen Negative    Benzodiazepine Urine Screen Negative Screen Negative    Cannabinoids Urine Screen Negative Screen Negative    Cocaine Urine Screen Negative Screen Negative    Fentanyl Qual Urine Screen Negative Screen Negative    Opiates Urine Screen Negative Screen Negative    PCP Urine Screen Negative Screen Negative   Urine Drug Screen Panel    Collection Time: 06/25/24 11:36 PM   Result Value Ref Range    Amphetamines Urine Screen Negative Screen Negative    Barbituates Urine Screen Negative Screen Negative    Benzodiazepine Urine Screen Negative Screen Negative    Cannabinoids Urine Screen Negative Screen Negative    Cocaine Urine Screen Negative Screen Negative    Fentanyl Qual Urine Screen Negative Screen Negative    Opiates Urine Screen Negative Screen Negative    PCP Urine Screen Negative Screen Negative   Comprehensive metabolic panel    Collection Time: 06/26/24  9:27 PM   Result Value Ref Range    Sodium 136 135 - 145 mmol/L    Potassium 3.9 3.4 - 5.3 mmol/L    Carbon Dioxide (CO2) 29 22 - 29 mmol/L    Anion Gap 7 7 - 15 mmol/L    Urea Nitrogen 12.1 6.0 - 20.0 mg/dL    Creatinine 0.86 0.67 - 1.17 mg/dL    GFR Estimate >90 >60 mL/min/1.73m2    Calcium 9.3 8.6 - 10.0 mg/dL    Chloride 100 98 - 107 mmol/L    Glucose 110 (H) 70 - 99 mg/dL    Alkaline Phosphatase 75 40 - 150 U/L    AST 31 0 - 45 U/L    ALT 37 0 - 70 U/L    Protein Total 6.9 6.4 - 8.3 g/dL    Albumin 4.3 3.5 - 5.2 g/dL    Bilirubin Total 0.4 <=1.2 mg/dL   CK total    Collection Time: 06/26/24  9:27 PM   Result Value Ref Range     39 - 308 U/L   CBC with platelets and differential    Collection Time: 06/26/24  9:27 PM   Result Value Ref Range    WBC Count 7.5 4.0 - 11.0 10e3/uL    RBC Count 4.68 4.40 - 5.90 10e6/uL    Hemoglobin 14.3 13.3 - 17.7 g/dL    Hematocrit 42.4 40.0 - 53.0 %    MCV 91 78 - 100 fL    MCH 30.6 26.5 - 33.0 pg    MCHC 33.7 31.5 - 36.5 g/dL    RDW 13.0 10.0 - 15.0 %    Platelet Count 257 150 - 450  10e3/uL    % Neutrophils 61 %    % Lymphocytes 26 %    % Monocytes 11 %    % Eosinophils 2 %    % Basophils 0 %    % Immature Granulocytes 0 %    NRBCs per 100 WBC 0 <1 /100    Absolute Neutrophils 4.6 1.6 - 8.3 10e3/uL    Absolute Lymphocytes 1.9 0.8 - 5.3 10e3/uL    Absolute Monocytes 0.8 0.0 - 1.3 10e3/uL    Absolute Eosinophils 0.2 0.0 - 0.7 10e3/uL    Absolute Basophils 0.0 0.0 - 0.2 10e3/uL    Absolute Immature Granulocytes 0.0 <=0.4 10e3/uL    Absolute NRBCs 0.0 10e3/uL   Urine Drug Screen Panel    Collection Time: 06/26/24  9:29 PM   Result Value Ref Range    Amphetamines Urine Screen Negative Screen Negative    Barbituates Urine Screen Negative Screen Negative    Benzodiazepine Urine Screen Negative Screen Negative    Cannabinoids Urine Screen Negative Screen Negative    Cocaine Urine Screen Negative Screen Negative    Fentanyl Qual Urine Screen Negative Screen Negative    Opiates Urine Screen Negative Screen Negative    PCP Urine Screen Negative Screen Negative   Symptomatic COVID-19 Virus (Coronavirus) by PCR Nasopharyngeal    Collection Time: 06/27/24  6:52 AM    Specimen: Nasopharyngeal; Swab   Result Value Ref Range    SARS CoV2 PCR Negative Negative   Lipid Profile    Collection Time: 06/29/24  7:55 AM   Result Value Ref Range    Cholesterol 209 (H) <200 mg/dL    Triglycerides 281 (H) <150 mg/dL    Direct Measure HDL 70 >=40 mg/dL    LDL Cholesterol Calculated 83 <=100 mg/dL    Non HDL Cholesterol 139 (H) <130 mg/dL   Hemoglobin A1c    Collection Time: 06/29/24  7:55 AM   Result Value Ref Range    Hemoglobin A1C 5.4 <5.7 %            Precautions:     Behavioral Orders   Procedures    Assault precautions    Code 1 - Restrict to Unit    Elopement precautions    Fall precautions    Routine Programming     As clinically indicated    Status 15     Every 15 minutes.    Status Individual Observation     Patient SIO 1:1 status reviewed with team/RN.  Please also refer to RN/team documentation for add'l  detail.  2:1     -SIO staff to monitor following which have contributed to patient being on SIO:  Pt was observed pushing on security staff in the ER with intent of leaving the hospital.  -Possible interventions SIO staff could use to support patient's treatment progress:  Offer redirections   -When following observed, team will review discontinuation of SIO:  Able to sign in voluntary and contract for safety     Order Specific Question:   CONTINUOUS 24 hours / day     Answer:   Other     Order Specific Question:   Specify distance     Answer:   10     Order Specific Question:   Indications for SIO     Answer:   Assault risk     Order Specific Question:   Indications for SIO     Answer:   Severe intrusiveness    Suicide precautions: Suicide Risk: HIGH; Clinical rationale to override score: modification to the care environment     Patients on Suicide Precautions should have a Combination Diet ordered that includes a Diet selection(s) AND a Behavioral Tray selection for Safe Tray - with utensils, or Safe Tray - NO utensils       Order Specific Question:   Suicide Risk     Answer:   HIGH     Order Specific Question:   Clinical rationale to override score:     Answer:   modification to the care environment            Psychiatric Examination:   Temp: 97.7  F (36.5  C) Temp src: Temporal BP: 115/72 Pulse: 76   Resp: 16 SpO2: 100 % O2 Device: None (Room air)    Weight is 177 lbs 7.52 oz  Body mass index is 22.79 kg/m .    Appearance: awake, alert and adequately groomed  Attitude:  evasive, guarded, and uncooperative  Eye Contact:  intense  Mood:  anxious and irritable, agitated  Affect:  intensity is exaggerated, intensity is heightened, intensity is dramatic, and labile  Speech:  pressured speech  Psychomotor Behavior:  no evidence of tardive dyskinesia, dystonia, or tics  Throught Process:  linear, disorganized, illogical, and tangental  Associations:  loosening of associations present  Thought Content:  no evidence of  suicidal ideation or homicidal ideation and paranoia   Insight:  limited  Judgement:  limited  Oriented to:  time, person, and place  Attention Span and Concentration:  fair  Recent and Remote Memory:  fair           Precautions:     Behavioral Orders   Procedures    Assault precautions    Code 1 - Restrict to Unit    Elopement precautions    Fall precautions    Routine Programming     As clinically indicated    Status 15     Every 15 minutes.    Status Individual Observation     Patient SIO 1:1 status reviewed with team/RN.  Please also refer to RN/team documentation for add'l detail.  2:1     -SIO staff to monitor following which have contributed to patient being on SIO:  Pt was observed pushing on security staff in the ER with intent of leaving the hospital.  -Possible interventions SIO staff could use to support patient's treatment progress:  Offer redirections   -When following observed, team will review discontinuation of SIO:  Able to sign in voluntary and contract for safety     Order Specific Question:   CONTINUOUS 24 hours / day     Answer:   Other     Order Specific Question:   Specify distance     Answer:   10     Order Specific Question:   Indications for SIO     Answer:   Assault risk     Order Specific Question:   Indications for SIO     Answer:   Severe intrusiveness    Suicide precautions: Suicide Risk: HIGH; Clinical rationale to override score: modification to the care environment     Patients on Suicide Precautions should have a Combination Diet ordered that includes a Diet selection(s) AND a Behavioral Tray selection for Safe Tray - with utensils, or Safe Tray - NO utensils       Order Specific Question:   Suicide Risk     Answer:   HIGH     Order Specific Question:   Clinical rationale to override score:     Answer:   modification to the care environment          DIagnoses:   Schizoaffective disorder, bipolar type, current episode manic, severe, with psychosis  Stimulant use disorder, per his  disorder, per history  Noncompliance noncompliance with treatment       Plan:   Medications:    -- Increase Depakote to 500, bid, for mood stabilization on 7/15  -- Continue gabapentin, 400 mg 3 times a day for anxiety and agitation  -- Nicotine patch, 21 mg every morning  -- Zyprexa, 10 mg twice a day for agitation and psychosis  -- Start Haldol 5 mg, bid for agitation, candie and psychosis  -- Vitamin D, 50 mcg at bedtime  -- Additional medications include B-52, Latuda, Zyprexa, trazodone    Medical:  --Internal medicine to follow up for medical problems     --Lab work was reviewed.  Abnormal results includes cholesterol 209, glucose 110, non-, triglyceride 281.  The rest of the results were normal.  U-Tox was negative.      --SIO for severe intrusiveness  --Cheeking precautions  --Care was coordinated with the treatment team.   --The patient was consulted on nature of illness and treatment options.      Disposition Plan   Reason for ongoing admission: is unable to care for self due to severe psychosis or candie  Discharge location: [facility] facility  Discharge Medications: not ordered  Follow-up Appointments: not scheduled    Legal Status:  Court hold as of 3:51 on 24  Continuing hold order was received late in the day.     [facility]: [facility]  File Number: [id number]  Start and expiration date of commitment:         Final Hearin/15 @ 9:45, does not need provider testimony     Court  asked for DA. DA was sent 7/10/24.  Neha Humphries  Senior   [facility] Behavioral Health Assessment/Intake  Office: [phone] Fax: [phone]  [email or web address], [email or web address],   [address] [place] [address]       Contacts:        [facility]US[facility]IN [facility]  [facility] Attorney: Victoria Christianson @ [phone]     CLIFTON for aunt.Aunt:Nita @ [phone])  Father:Subhash @ [phone]  Discharge will be granted once symptoms improved.    Kaden  Jeremiah RIVERA CNP    This note was created with the help of Dragon dictation system. All grammatical/typing errors or context distortion are unintentional and inherent to software.

## 2024-07-16 NOTE — PLAN OF CARE
"  Problem: Psychotic Signs/Symptoms  Goal: Improved Psychomotor Symptoms (Psychotic Signs/Symptoms)  Outcome: Not Progressing    After meeting with with his provider this morning pt came out in the lounge angry, took his shirt off, sat down and said \"we're going to have some fun now, I'm never going home\". He kept shouting out loudly that he was never going home but that he was not going to take his med's. He had been told he would not be leaving today. Moving from chair to chair in the lounge. He would not put his shirt on, take his med's or leave the lounge. LUCIANO Team was called for support. After LUCIANO Team arrived staff was able to convince him to go to his room. He put his shirt on and took his med's after accepting that his status here with discharge was in the hand of the courts. At 0840, after pt had taken his med's he said he was going to lay down and didn't want to get up until lunch.    1255: Pt slept until about 1215 after above incident. When he got up he was calm for a short time, at the desk getting coffee. Then another pt started yelling at him for no reason. This triggered him to start yelling at the other pt, swearing at each other. He started to take his shirt off but stopped. He calmed. He then took his earlier scheduled Haldol, which was a new medication for him. He made a phone call to his family. This highly upset him and he started yelling again. He agreed to go into the group room for some quiet. He calmed and shortly thereafter went to his room. He has currently been eating lunch in the unge calmly.    After lunch he decided to lay down for a rest. He fell asleep and is still sleeping as of 1435.  "

## 2024-07-16 NOTE — DISCHARGE INSTRUCTIONS
Behavioral Discharge Planning and Instructions    Summary: You were admitted on 6/26/2024  due to Bipolar II Disorder  and Disorganized Thinking/Behaviors.  You were treated by Jese Arizmendi MD and MIGUEL bOando, CNP and are being provisionally discharged on August 9. 2024 from [facility] to IR at [facility].  Transportation was provided by your aunt.  CLIFTON for aunt.Aunt:Chela@ [phone])  CLIFTON for Father:Dereck @ [phone]       Main Diagnosis:   Schizoaffective disorder, bipolar type, current episode manic, severe, with psychosis  Stimulant use disorder, per his disorder, per history        Commitment:   You were dually committed to the [facility] and the Commissioner of Human Services on July 15, 2024 and you are being discharged on a Provisional Discharge Agreement which shall remain in effect for the duration of the Commitment which expires on January 17, 2025. and [place]: You are also court ordered to take the medications that the doctor ordered for you.     Health Care Follow-up:   Insurance Company Name [place] (Elig Type [place]: [place] Early Expansion)  PMI # [id number]  [facility]  You have medical assistance for insurance coverage. You can use [employer] - - [phone] for transportation to your health care appointments if you do not have a way to get to your appointments.       Mental Health Targeted Case Management  Suha Otto  [phone]    Direct Supervisor is Taylor Antonio [phone]         [facility] at [facility] [place] location.  [address] [address]  Ph: [phone]  The Health Unit Coordinator has faxed these discharge instructions to Fax: [phone]      Psychiatric Medication Management  Injection due August 19,  You will see Marquita Brady in person on Thursday, August 15, 2024 @ 12:05PM. She will prescribe your anti=psychotic  injection. She will send over the order to [facility] to have them give you the injection. You can also check at the IRTs to see if their nurse can give you the injection.  [facility]   [address]  [address]  [place] [address]  [phone]  The Health Unit Coordinator has faxed these discharge instructions to Fax: [phone]      You were not able to return to Dr. Chong at [facility] because you owe $240. This is most likely because your insurance lapsed. They have your new insurance information. You can call and ask them to run the new insurance or you can pay the bill as this has been sent to collections.      Information will be faxed to your outpatient providers to ensure a healthy continuity of care for you.     Attend all scheduled appointments with your outpatient providers. Call at least 24 hours in advance if you need to reschedule an appointment to ensure continued access to your outpatient providers.     Major Treatments, Procedures and Findings:  You were provided with: a psychiatric assessment, assessed for medical stability, medication evaluation and/or management, group therapy, individual therapy, and milieu management    Symptoms to Report: feeling more aggressive, increased confusion, losing more sleep, mood getting worse, or thoughts of suicide    Early warning signs can include: increased depression or anxiety sleep disturbances increased thoughts or behaviors of suicide or self-harm  increased unusual thinking, such as paranoia or hearing voices    Safety and Wellness:  Take all medicines as directed.  Make no changes unless your doctor suggests them.      Follow treatment recommendations.  Refrain from alcohol and non-prescribed drugs.  If there is a concern for safety, call 911.    Resources:   Mental Health Crisis Resources  Throughout [place]: call **CRISIS (**404864)  Crisis Text Line: is available for free, 24/7 by texting [place] to 047065  Suicide Awareness  Voices of Education (SAVE) (www.save.org): 122-434-ANIV (0433)  The National Suicide Prevention Lifeline is now: 988 Suicide and Crisis Lifeline. Call 988 anytime.  National [place] on Mental Illness (www.[place].adalgisa.org): 320.327.7481 or 473-503-5536.  Rjrl1mvrw: text the word LIFE to 88768 for immediate support and crisis intervention  Mental Health Consumer/Survivor Network of [place] (www.mhcsn.net): 383.166.9980 or 298-733-8023  Mental Health Association of [place] (www.mentalhealth.org): 170.219.3528 or 052-196-6801  Peer Support Connection [place] Warmline (PSC) 1-799.862.5169 Available from 5pm - 9am (7 days a week/365 days a year)  [facility] 3-172-756-3256 Community Outreach for Psych Emergencies or [facility] 1-441.227.7710      General Medication Instructions:   See your medication sheet(s) for instructions.   Take all medicines as directed.  Make no changes unless your doctor suggests them.   Go to all your doctor visits.  Be sure to have all your required lab tests. This way, your medicines can be refilled on time.  Do not use any drugs not prescribed by your doctor.  Avoid alcohol.    Advance Directives:   Scanned document on file with [employer]? No scanned doc  Is document scanned? Pt states no documents  Honoring Choices Your Rights Handout: Informed and given  Was more information offered? Pt declined    The Treatment team has appreciated the opportunity to work with you. If you have any questions or concerns about your recent admission, you can contact the unit which can receive your call 24 hours a day, 7 days a week. They will be able to get in touch with a Provider if needed. The unit number is [phone] .

## 2024-07-16 NOTE — PLAN OF CARE
"  Problem: Psychotic Signs/Symptoms  Goal: Improved Behavioral Control (Psychotic Signs/Symptoms)  Outcome: Progressing   Goal Outcome Evaluation:    Aakash has been isolated and withdrawn to his room this evening. He has been sleeping off and on. He was awakened for supper. Ate all food available on his supper tray. He was loud and Intrusive when up. He continues to appear disheveled and unkept. He pleasantly greeted this writer. He continues on a SIO for intrusive behavior. He was medication compliant with all scheduled prescribed medications. No prns given this evening. No behavioral outbursts this evening secondary to sleeping the majority of the shift. Lamine continues on Suicide, Fall, assault, and Elopement precautions. Nursing to continue to support current plan of care.    /72 (BP Location: Right arm, Patient Position: Supine, Cuff Size: Adult Regular)   Pulse 76   Temp 97.7  F (36.5  C) (Temporal)   Resp 16   Ht 1.88 m (6' 2\")   Wt 80.5 kg (177 lb 7.5 oz)   SpO2 100%   BMI 22.79 kg/m                         "

## 2024-07-16 NOTE — PLAN OF CARE
BEH IP Unit Acuity Rating Score (UARS)  Patient is given one point for every criteria they meet.     CRITERIA SCORING   On a 72 hour hold, court hold, committed, stay of commitment, or revocation. 1    Patient LOS on BEH unit exceeds 20 days. 0  LOS: 19   Patient under guardianship, 55+, otherwise medically complex, or under age 11. 0   Suicide ideation without relief of precipitating factors. 0   Current plan for suicide. 0   Current plan for homicide. 0   Imminent risk or actual attempt to seriously harm another without relief of factors precipitating the attempt. 0   Severe dysfunction in daily living (ex: complete neglect for self care, extreme disruption in vegetative function, extreme deterioration in social interactions). 1   Recent (last 7 days) or current physical aggression in the ED or on unit. 0   Restraints or seclusion episode in past 72 hours. 0   Recent (last 7 days) or current verbal aggression, agitation, yelling, etc., while in the ED or unit. 0      Active psychosis. 1   Need for constant or near constant redirection (from leaving, from others, etc).  1   Intrusive or disruptive behaviors. 1   Patient requires 3 or more hours of individualized nursing care per 8-hour shift (i.e. for ADLs, meds, therapeutic interventions). 1   TOTAL 6

## 2024-07-17 PROCEDURE — 124N000002 HC R&B MH UMMC

## 2024-07-17 PROCEDURE — 250N000013 HC RX MED GY IP 250 OP 250 PS 637: Performed by: NURSE PRACTITIONER

## 2024-07-17 PROCEDURE — 250N000013 HC RX MED GY IP 250 OP 250 PS 637: Performed by: CLINICAL NURSE SPECIALIST

## 2024-07-17 PROCEDURE — 250N000013 HC RX MED GY IP 250 OP 250 PS 637: Performed by: PSYCHIATRY & NEUROLOGY

## 2024-07-17 PROCEDURE — 99232 SBSQ HOSP IP/OBS MODERATE 35: CPT | Performed by: NURSE PRACTITIONER

## 2024-07-17 RX ADMIN — DIVALPROEX SODIUM 500 MG: 500 TABLET, FILM COATED, EXTENDED RELEASE ORAL at 18:03

## 2024-07-17 RX ADMIN — GABAPENTIN 400 MG: 400 CAPSULE ORAL at 18:03

## 2024-07-17 RX ADMIN — HALOPERIDOL 5 MG: 5 TABLET ORAL at 18:03

## 2024-07-17 RX ADMIN — DIVALPROEX SODIUM 500 MG: 500 TABLET, FILM COATED, EXTENDED RELEASE ORAL at 09:36

## 2024-07-17 RX ADMIN — GABAPENTIN 400 MG: 400 CAPSULE ORAL at 09:36

## 2024-07-17 RX ADMIN — NICOTINE POLACRILEX 4 MG: 2 LOZENGE ORAL at 16:47

## 2024-07-17 RX ADMIN — NICOTINE 1 PATCH: 21 PATCH, EXTENDED RELEASE TRANSDERMAL at 09:37

## 2024-07-17 RX ADMIN — OLANZAPINE 15 MG: 10 TABLET, ORALLY DISINTEGRATING ORAL at 18:04

## 2024-07-17 RX ADMIN — GABAPENTIN 400 MG: 400 CAPSULE ORAL at 14:38

## 2024-07-17 RX ADMIN — Medication 50 MCG: at 09:36

## 2024-07-17 RX ADMIN — OLANZAPINE 15 MG: 10 TABLET, ORALLY DISINTEGRATING ORAL at 09:36

## 2024-07-17 RX ADMIN — HALOPERIDOL 5 MG: 5 TABLET ORAL at 09:36

## 2024-07-17 RX ADMIN — NICOTINE POLACRILEX 4 MG: 2 LOZENGE ORAL at 18:10

## 2024-07-17 ASSESSMENT — ACTIVITIES OF DAILY LIVING (ADL)
ADLS_ACUITY_SCORE: 40
DRESS: INDEPENDENT
ORAL_HYGIENE: INDEPENDENT
ADLS_ACUITY_SCORE: 40
LAUNDRY: UNABLE TO COMPLETE
ADLS_ACUITY_SCORE: 40
HYGIENE/GROOMING: INDEPENDENT
ADLS_ACUITY_SCORE: 40
DRESS: INDEPENDENT
ADLS_ACUITY_SCORE: 40
LAUNDRY: WITH SUPERVISION
HYGIENE/GROOMING: INDEPENDENT
ORAL_HYGIENE: INDEPENDENT
ADLS_ACUITY_SCORE: 40

## 2024-07-17 NOTE — PROGRESS NOTES
[facility], [place]   Psychiatric Progress Note        Interim History:   Team meeting report: The patient's care was discussed with the treatment team during the daily team meeting and/or staff's chart notes were reviewed.  Staff reports the patient had a difficult day.  He became agitated after talking to the provider.  He was loud and taking off his shirt.  He was walking briskly, was yelling and difficult to redirect.  The LUCI[facility]O team was called and patient was redirected back to his room.  He put his shirt on.  He took as needed medications.  In the evening, the patient was much Col[place].  He was seen responding to internal stimuli.  He ate supper.  Med compliant.  Did not require any additional medications.  Slept through the night.    Attempted to meet with the patient.  He did not want to talk to this provider.  He is med about being in the hospital.  He managed to say that he is doing well.  He is not hallucinating.  Repeated several times that he just wants to sleep refused to answer any other questions.    The patient will remain on SIO.         Medications:     Current Facility-Administered Medications   Medication Dose Route Frequency Provider Last Rate Last Admin    divalproex sodium extended-release (DEPAKOTE ER) 24 hr tablet 500 mg  500 mg Oral BID Kaden Daniels APRN CNP   500 mg at 07/17/24 0936    gabapentin (NEURONTIN) capsule 400 mg  400 mg Oral TID Jsee Arizmendi MD   400 mg at 07/17/24 0936    haloperidol (HALDOL) tablet 5 mg  5 mg Oral BID Kaden Daniels APRN CNP   5 mg at 07/17/24 0936    nicotine (NICODERM CQ) 21 MG/24HR 24 hr patch 1 patch  1 patch Transdermal Daily Jese Arizmendi MD   1 patch at 07/17/24 0937    OLANZapine zydis (zyPREXA) ODT tab 15 mg  15 mg Oral BID Jese Arizmendi MD   15 mg at 07/17/24 0936    Vitamin D3 (CHOLECALCIFEROL) tablet 50 mcg  50 mcg Oral At Bedtime Maria Pennington APRN CNP    50 mcg at 07/17/24 0936            Allergies:     Allergies   Allergen Reactions    Paliperidone Other (See Comments)     Other reaction(s): Extrapyramidal Symptoms   Significant EPS    Significant EPS    Pork Allergy Other (See Comments)     Tenriism prohibition            Labs:     Recent Results (from the past 672 hour(s))   Urine Drug Screen Panel    Collection Time: 06/21/24  8:50 AM   Result Value Ref Range    Amphetamines Urine Screen Negative Screen Negative    Barbituates Urine Screen Negative Screen Negative    Benzodiazepine Urine Screen Negative Screen Negative    Cannabinoids Urine Screen Negative Screen Negative    Cocaine Urine Screen Negative Screen Negative    Fentanyl Qual Urine Screen Negative Screen Negative    Opiates Urine Screen Negative Screen Negative    PCP Urine Screen Negative Screen Negative   Urine Drug Screen Panel    Collection Time: 06/22/24  8:31 PM   Result Value Ref Range    Amphetamines Urine Screen Negative Screen Negative    Barbituates Urine Screen Negative Screen Negative    Benzodiazepine Urine Screen Negative Screen Negative    Cannabinoids Urine Screen Negative Screen Negative    Cocaine Urine Screen Negative Screen Negative    Fentanyl Qual Urine Screen Negative Screen Negative    Opiates Urine Screen Negative Screen Negative    PCP Urine Screen Negative Screen Negative   Urine Drug Screen Panel    Collection Time: 06/25/24 11:36 PM   Result Value Ref Range    Amphetamines Urine Screen Negative Screen Negative    Barbituates Urine Screen Negative Screen Negative    Benzodiazepine Urine Screen Negative Screen Negative    Cannabinoids Urine Screen Negative Screen Negative    Cocaine Urine Screen Negative Screen Negative    Fentanyl Qual Urine Screen Negative Screen Negative    Opiates Urine Screen Negative Screen Negative    PCP Urine Screen Negative Screen Negative   Comprehensive metabolic panel    Collection Time: 06/26/24  9:27 PM   Result Value Ref Range    Sodium 136  135 - 145 mmol/L    Potassium 3.9 3.4 - 5.3 mmol/L    Carbon Dioxide (CO2) 29 22 - 29 mmol/L    Anion Gap 7 7 - 15 mmol/L    Urea Nitrogen 12.1 6.0 - 20.0 mg/dL    Creatinine 0.86 0.67 - 1.17 mg/dL    GFR Estimate >90 >60 mL/min/1.73m2    Calcium 9.3 8.6 - 10.0 mg/dL    Chloride 100 98 - 107 mmol/L    Glucose 110 (H) 70 - 99 mg/dL    Alkaline Phosphatase 75 40 - 150 U/L    AST 31 0 - 45 U/L    ALT 37 0 - 70 U/L    Protein Total 6.9 6.4 - 8.3 g/dL    Albumin 4.3 3.5 - 5.2 g/dL    Bilirubin Total 0.4 <=1.2 mg/dL   CK total    Collection Time: 06/26/24  9:27 PM   Result Value Ref Range     39 - 308 U/L   CBC with platelets and differential    Collection Time: 06/26/24  9:27 PM   Result Value Ref Range    WBC Count 7.5 4.0 - 11.0 10e3/uL    RBC Count 4.68 4.40 - 5.90 10e6/uL    Hemoglobin 14.3 13.3 - 17.7 g/dL    Hematocrit 42.4 40.0 - 53.0 %    MCV 91 78 - 100 fL    MCH 30.6 26.5 - 33.0 pg    MCHC 33.7 31.5 - 36.5 g/dL    RDW 13.0 10.0 - 15.0 %    Platelet Count 257 150 - 450 10e3/uL    % Neutrophils 61 %    % Lymphocytes 26 %    % Monocytes 11 %    % Eosinophils 2 %    % Basophils 0 %    % Immature Granulocytes 0 %    NRBCs per 100 WBC 0 <1 /100    Absolute Neutrophils 4.6 1.6 - 8.3 10e3/uL    Absolute Lymphocytes 1.9 0.8 - 5.3 10e3/uL    Absolute Monocytes 0.8 0.0 - 1.3 10e3/uL    Absolute Eosinophils 0.2 0.0 - 0.7 10e3/uL    Absolute Basophils 0.0 0.0 - 0.2 10e3/uL    Absolute Immature Granulocytes 0.0 <=0.4 10e3/uL    Absolute NRBCs 0.0 10e3/uL   Urine Drug Screen Panel    Collection Time: 06/26/24  9:29 PM   Result Value Ref Range    Amphetamines Urine Screen Negative Screen Negative    Barbituates Urine Screen Negative Screen Negative    Benzodiazepine Urine Screen Negative Screen Negative    Cannabinoids Urine Screen Negative Screen Negative    Cocaine Urine Screen Negative Screen Negative    Fentanyl Qual Urine Screen Negative Screen Negative    Opiates Urine Screen Negative Screen Negative    PCP Urine  Screen Negative Screen Negative   Symptomatic COVID-19 Virus (Coronavirus) by PCR Nasopharyngeal    Collection Time: 06/27/24  6:52 AM    Specimen: Nasopharyngeal; Swab   Result Value Ref Range    SARS CoV2 PCR Negative Negative   Lipid Profile    Collection Time: 06/29/24  7:55 AM   Result Value Ref Range    Cholesterol 209 (H) <200 mg/dL    Triglycerides 281 (H) <150 mg/dL    Direct Measure HDL 70 >=40 mg/dL    LDL Cholesterol Calculated 83 <=100 mg/dL    Non HDL Cholesterol 139 (H) <130 mg/dL   Hemoglobin A1c    Collection Time: 06/29/24  7:55 AM   Result Value Ref Range    Hemoglobin A1C 5.4 <5.7 %            Precautions:     Behavioral Orders   Procedures    Assault precautions    Code 1 - Restrict to Unit    Elopement precautions    Fall precautions    Routine Programming     As clinically indicated    Status 15     Every 15 minutes.    Status Individual Observation     Patient SIO 1:1 status reviewed with team/RN.  Please also refer to RN/team documentation for add'l detail.  2:1     -SIO staff to monitor following which have contributed to patient being on SIO:  Pt was observed pushing on security staff in the ER with intent of leaving the hospital.  -Possible interventions SIO staff could use to support patient's treatment progress:  Offer redirections   -When following observed, team will review discontinuation of SIO:  Able to sign in voluntary and contract for safety     Order Specific Question:   CONTINUOUS 24 hours / day     Answer:   Other     Order Specific Question:   Specify distance     Answer:   10     Order Specific Question:   Indications for SIO     Answer:   Assault risk     Order Specific Question:   Indications for SIO     Answer:   Severe intrusiveness    Suicide precautions: Suicide Risk: HIGH; Clinical rationale to override score: modification to the care environment     Patients on Suicide Precautions should have a Combination Diet ordered that includes a Diet selection(s) AND a  Behavioral Tray selection for Safe Tray - with utensils, or Safe Tray - NO utensils       Order Specific Question:   Suicide Risk     Answer:   HIGH     Order Specific Question:   Clinical rationale to override score:     Answer:   modification to the care environment            Psychiatric Examination:   Temp: 98.1  F (36.7  C) Temp src: Temporal BP: 116/78 Pulse: 99     SpO2: 98 % O2 Device: None (Room air)    Weight is 175 lbs 1.6 oz  Body mass index is 22.48 kg/m .    Appearance: awake, alert and adequately groomed  Attitude:  evasive, guarded, and uncooperative  Eye Contact:  intense  Mood:  anxious and irritable, agitated  Affect:  intensity is exaggerated, intensity is heightened, intensity is dramatic, and labile  Speech:  pressured speech  Psychomotor Behavior:  no evidence of tardive dyskinesia, dystonia, or tics  Throught Process:  linear, disorganized, illogical, and tangental  Associations:  loosening of associations present  Thought Content:  no evidence of suicidal ideation or homicidal ideation and paranoia   Insight:  limited  Judgement:  limited  Oriented to:  time, person, and place  Attention Span and Concentration:  fair  Recent and Remote Memory:  fair           Precautions:     Behavioral Orders   Procedures    Assault precautions    Code 1 - Restrict to Unit    Elopement precautions    Fall precautions    Routine Programming     As clinically indicated    Status 15     Every 15 minutes.    Status Individual Observation     Patient SIO 1:1 status reviewed with team/RN.  Please also refer to RN/team documentation for add'l detail.  2:1     -SIO staff to monitor following which have contributed to patient being on SIO:  Pt was observed pushing on security staff in the ER with intent of leaving the hospital.  -Possible interventions SIO staff could use to support patient's treatment progress:  Offer redirections   -When following observed, team will review discontinuation of SIO:  Able to sign in  voluntary and contract for safety     Order Specific Question:   CONTINUOUS 24 hours / day     Answer:   Other     Order Specific Question:   Specify distance     Answer:   10     Order Specific Question:   Indications for SIO     Answer:   Assault risk     Order Specific Question:   Indications for SIO     Answer:   Severe intrusiveness    Suicide precautions: Suicide Risk: HIGH; Clinical rationale to override score: modification to the care environment     Patients on Suicide Precautions should have a Combination Diet ordered that includes a Diet selection(s) AND a Behavioral Tray selection for Safe Tray - with utensils, or Safe Tray - NO utensils       Order Specific Question:   Suicide Risk     Answer:   HIGH     Order Specific Question:   Clinical rationale to override score:     Answer:   modification to the care environment          DIagnoses:   Schizoaffective disorder, bipolar type, current episode manic, severe, with psychosis  Stimulant use disorder, per his disorder, per history  Noncompliance noncompliance with treatment       Plan:   Medications:    -- Increase Depakote to 500, bid, for mood stabilization on 7/15  -- Continue gabapentin, 400 mg 3 times a day for anxiety and agitation  -- Nicotine patch, 21 mg every morning  -- Zyprexa, 10 mg twice a day for agitation and psychosis  -- Start Haldol 5 mg, bid for agitation, candie and psychosis  -- Vitamin D, 50 mcg at bedtime  -- Additional medications include B-52, Latuda, Zyprexa, trazodone    Medical:  --Internal medicine to follow up for medical problems     --Lab work was reviewed.  Abnormal results includes cholesterol 209, glucose 110, non-, triglyceride 281.  The rest of the results were normal.  U-Tox was negative.      --SIO for severe intrusiveness  --Cheeking precautions  --Care was coordinated with the treatment team.   --The patient was consulted on nature of illness and treatment options.      Disposition Plan   Reason for ongoing  admission: is unable to care for self due to severe psychosis or candie  Discharge location: [facility] facility  Discharge Medications: not ordered  Follow-up Appointments: not scheduled    Legal Status:  Court hold as of 3:51 on 24  Continuing hold order was received late in the day.     County: [facility]  File Number: [id number]  Start and expiration date of commitment:         Final Hearin/15 @ 9:45, does not need provider testimony     Court  asked for DA. DA was sent 7/10/24.  Neha Humphries  Senior   [facility] Behavioral Health Assessment/Intake  Office: [phone] Fax: [phone]  [email or web address], [address], [address] [address]       Contacts:        HUSSEIN Andersen [facility]  [facility] Attorney: Victoriasilverio Estevezw @ [phone]     CLIFTON for aunt.Aunt:Nita @ [phone])  Father:Subhash @ [phone]  Discharge will be granted once symptoms improved.    Kaden RIVERA, CNP    This note was created with the help of Dragon dictation system. All grammatical/typing errors or context distortion are unintentional and inherent to software.

## 2024-07-17 NOTE — PLAN OF CARE
"Goal Outcome Evaluation:    Patient continues on SIO 1:1 status at 10 ft for Assault Risk and Severe Intrusiveness. Presents with an excitable/animated affect. Reports mood as \"Okay.\" Is pleasant, polite, calm, and cooperative; yet is restless. Thought content presents as preoccupation. Thought process presents as disorganized and flight of ideas. Speech presents as circumstantial, flight of ideas, and rambling. Eye contact is good. Patient denies depression, anxiety, suicidal ideation, SIB, homicidal ideation, and auditory/visual hallucinations. No medical issues noted. Vital signs stable. Patient was medication compliant. Did not attend group this shift. Ate his evening meal. Patient out in the milieu this shift walking the hallway with SIO 1:1 staff in attendance. Social with staff.     /75 (BP Location: Right arm)   Pulse 102   Temp 98.3  F (36.8  C) (Oral)   Resp 16   Ht 1.88 m (6' 2\")   Wt 79.4 kg (175 lb 1.6 oz)   SpO2 100%   BMI 22.48 kg/m                     "

## 2024-07-17 NOTE — PLAN OF CARE
"  Problem: Psychotic Signs/Symptoms  Goal: Improved Behavioral Control (Psychotic Signs/Symptoms)  Outcome: Progressing   Goal Outcome Evaluation:    Plan of Care Reviewed With: patient      Aakash has been out of his room infrequently this evening. He has been restless and moving quickly when in the community areas of the unit. He has sat down to watch television a few times but doesn't stay seated very long. He has been polite, pleasant and cooperative with this writer. He continues to appear to be responding to internal stimuli. He ate well for supper. He requested his evening medications early this evening as this has been his routine. He was medication compliant with all scheduled medications. He was given no prn medications this evening. He continues to need frequent redirection. He continues on his SIO for severe intrusiveness. Brandan continues on Suicide, Fall, Assault, and elopement precautions. Nursing to continue tos support current plan of care.    /77   Pulse 81   Temp 98  F (36.7  C) (Oral)   Resp 16   Ht 1.88 m (6' 2\")   Wt 79.4 kg (175 lb 1.6 oz)   SpO2 99%   BMI 22.48 kg/m                    "

## 2024-07-17 NOTE — PLAN OF CARE
"  Problem: Adult Behavioral Health Plan of Care  Goal: Plan of Care Review  Recent Flowsheet Documentation  Taken 7/17/2024 1200 by Belle Condon RN  Patient Agreement with Plan of Care: unable to participate   Goal Outcome Evaluation:               The patient slept for a majority of the shift.  The patient speaks to the writer when approached.  \"Hi auntie, how are you.\"  The patient allows his vitals to be taken.  Refused breakfast and ate lunch.  Moves around the bed independently.  The patient is intentional with isolation to his room.  He is encouraged to come out for groups and unit activities.  The patient declined this shift.  The patient is compliant with is medications.  Denies pain.  Denies medical concerns.  Blood pressure 116/78, pulse 99, temperature 98.1  F (36.7  C), temperature source Temporal, resp. rate 16, height 1.88 m (6' 2\"), weight 79.4 kg (175 lb 1.6 oz), SpO2 98%.       Will continue to monitor.          "

## 2024-07-17 NOTE — PLAN OF CARE
Team Note Due:  Friday    Assessment/Intervention/Current Symtoms and Care Coordination:  Chart review and met with team, discussed pt progress, symptomology, and response to treatment.  Discussed the discharge plan and any potential impediments to discharge.    Pt had final hearing and we are waiting for comittment order and [place] order. I made my daily call to the court.  He is on a 1:1.  He is sleeping more.        Discharge Plan or Goal:  [facility]  OR  [facility] treatment         Barriers to Discharge:  Pt is ill and does not have providers     Referral Status:  Too early  Pt has no established providers       Medical Assistance  [place] is now active.  Insurance Company Name [place]ICA[place] (Elig Type [place]: [place] Early Expansion)  PMI # [id number]  [facility]      Legal Status:  Court hold as of 3:51 on 7/5/24 7/12/24  Continuing hold order was received late in the day.    [facility]: [facility]  File Number: [id number]  Start and expiration date of commitment:       Court  asked for DA. DA was sent 7/10/24. Waiting for case management assignment.  Neha Humphries  Senior   \[facility] [place] [facility]\"" Behavioral Health Assessment/Intake  Office: [phone] Fax: [phone]  [email or web address], [address], [address]  [address] [address]      Contacts:      HUSSEIN Kohler  [facility] Attorney: Victoria Christianson @ [phone]    CLIFTON for aunt.Aunt:Nita @ [phone])  Father:Subhash @ [phone]        Upcoming Meetings and Dates/Important Information and next steps:      Waiting for commitment and [place] order    Pt will need provisional discharge at the time of discharge.

## 2024-07-17 NOTE — PLAN OF CARE
Goal Outcome Evaluation:      Problem: Sleep Disturbance  Goal: Adequate Sleep/Rest  Outcome: Adequate for Care Transition     NOC Shift Report    Pt in bed at beginning of shift, breathing quiet and unlabored. Pt slept through shift. Pt slept 6.5 hours.     No pt complaints or concerns at this time.     No PRNs given. Will continue to monitor.

## 2024-07-18 PROCEDURE — 250N000013 HC RX MED GY IP 250 OP 250 PS 637: Performed by: PSYCHIATRY & NEUROLOGY

## 2024-07-18 PROCEDURE — 99232 SBSQ HOSP IP/OBS MODERATE 35: CPT | Performed by: NURSE PRACTITIONER

## 2024-07-18 PROCEDURE — 250N000013 HC RX MED GY IP 250 OP 250 PS 637: Performed by: CLINICAL NURSE SPECIALIST

## 2024-07-18 PROCEDURE — 250N000013 HC RX MED GY IP 250 OP 250 PS 637: Performed by: NURSE PRACTITIONER

## 2024-07-18 PROCEDURE — 124N000002 HC R&B MH UMMC

## 2024-07-18 RX ADMIN — DIVALPROEX SODIUM 500 MG: 500 TABLET, FILM COATED, EXTENDED RELEASE ORAL at 18:00

## 2024-07-18 RX ADMIN — GABAPENTIN 400 MG: 400 CAPSULE ORAL at 18:00

## 2024-07-18 RX ADMIN — NICOTINE 1 PATCH: 21 PATCH, EXTENDED RELEASE TRANSDERMAL at 09:01

## 2024-07-18 RX ADMIN — OLANZAPINE 15 MG: 10 TABLET, ORALLY DISINTEGRATING ORAL at 09:00

## 2024-07-18 RX ADMIN — HALOPERIDOL 5 MG: 5 TABLET ORAL at 09:00

## 2024-07-18 RX ADMIN — HALOPERIDOL 5 MG: 5 TABLET ORAL at 18:00

## 2024-07-18 RX ADMIN — DIVALPROEX SODIUM 500 MG: 500 TABLET, FILM COATED, EXTENDED RELEASE ORAL at 09:00

## 2024-07-18 RX ADMIN — OLANZAPINE 15 MG: 10 TABLET, ORALLY DISINTEGRATING ORAL at 18:03

## 2024-07-18 RX ADMIN — GABAPENTIN 400 MG: 400 CAPSULE ORAL at 14:26

## 2024-07-18 RX ADMIN — Medication 50 MCG: at 09:00

## 2024-07-18 RX ADMIN — NICOTINE POLACRILEX 4 MG: 2 LOZENGE ORAL at 16:32

## 2024-07-18 RX ADMIN — GABAPENTIN 400 MG: 400 CAPSULE ORAL at 09:00

## 2024-07-18 ASSESSMENT — ACTIVITIES OF DAILY LIVING (ADL)
HYGIENE/GROOMING: INDEPENDENT
ADLS_ACUITY_SCORE: 40
LAUNDRY: UNABLE TO COMPLETE
ADLS_ACUITY_SCORE: 40
DRESS: INDEPENDENT
DRESS: INDEPENDENT
ADLS_ACUITY_SCORE: 40
ORAL_HYGIENE: INDEPENDENT
HYGIENE/GROOMING: INDEPENDENT
ADLS_ACUITY_SCORE: 40

## 2024-07-18 NOTE — PROGRESS NOTES
[facility], [place]   Psychiatric Progress Note        Interim History:   Team meeting report: The patient's care was discussed with the treatment team during the daily team meeting and/or staff's chart notes were reviewed.  Staff reports the patient continues to be loud and unpredictable. He spent most of the morning in bed. Refused breakfast but ate lunch and dinner. In the evening, he was pleasant and cooperative. Restless, disorganized, rumbling. Denied psychotic symptoms. Med compliant. No episodes of escalation. No PRN medications. Slept all night.     Met with patient. Continue to be labile. Mood changes from very angry to very happy in a manor of seconds. Initially declined to talk to me but ultimately agreed after the staff reminded him that he had questions for the provider earlier in the day. The patient wants to know when he will be discharging. He is aware that as of yesterday, he has an insurance and now we can start making referrals for IRTS places. The patient was very happy to hear the news. He didn't have any other questions or concerns.     The patient will remain on SIO.         Medications:     Current Facility-Administered Medications   Medication Dose Route Frequency Provider Last Rate Last Admin    divalproex sodium extended-release (DEPAKOTE ER) 24 hr tablet 500 mg  500 mg Oral BID Kaden Daniels APRN CNP   500 mg at 07/18/24 0900    gabapentin (NEURONTIN) capsule 400 mg  400 mg Oral TID Jese Arizmendi MD   400 mg at 07/18/24 0900    haloperidol (HALDOL) tablet 5 mg  5 mg Oral BID Kaden Daniels APRN CNP   5 mg at 07/18/24 0900    nicotine (NICODERM CQ) 21 MG/24HR 24 hr patch 1 patch  1 patch Transdermal Daily Jese Arizmendi MD   1 patch at 07/18/24 0901    OLANZapine zydis (zyPREXA) ODT tab 15 mg  15 mg Oral BID Jese Arizmendi MD   15 mg at 07/18/24 0900    Vitamin D3 (CHOLECALCIFEROL) tablet 50 mcg  50 mcg  Oral At Bedtime Maria Pennington APRN CNP   50 mcg at 07/18/24 0900            Allergies:     Allergies   Allergen Reactions    Paliperidone Other (See Comments)     Other reaction(s): Extrapyramidal Symptoms   Significant EPS    Significant EPS    Pork Allergy Other (See Comments)     Synagogue prohibition            Labs:     Recent Results (from the past 672 hour(s))   Urine Drug Screen Panel    Collection Time: 06/21/24  8:50 AM   Result Value Ref Range    Amphetamines Urine Screen Negative Screen Negative    Barbituates Urine Screen Negative Screen Negative    Benzodiazepine Urine Screen Negative Screen Negative    Cannabinoids Urine Screen Negative Screen Negative    Cocaine Urine Screen Negative Screen Negative    Fentanyl Qual Urine Screen Negative Screen Negative    Opiates Urine Screen Negative Screen Negative    PCP Urine Screen Negative Screen Negative   Urine Drug Screen Panel    Collection Time: 06/22/24  8:31 PM   Result Value Ref Range    Amphetamines Urine Screen Negative Screen Negative    Barbituates Urine Screen Negative Screen Negative    Benzodiazepine Urine Screen Negative Screen Negative    Cannabinoids Urine Screen Negative Screen Negative    Cocaine Urine Screen Negative Screen Negative    Fentanyl Qual Urine Screen Negative Screen Negative    Opiates Urine Screen Negative Screen Negative    PCP Urine Screen Negative Screen Negative   Urine Drug Screen Panel    Collection Time: 06/25/24 11:36 PM   Result Value Ref Range    Amphetamines Urine Screen Negative Screen Negative    Barbituates Urine Screen Negative Screen Negative    Benzodiazepine Urine Screen Negative Screen Negative    Cannabinoids Urine Screen Negative Screen Negative    Cocaine Urine Screen Negative Screen Negative    Fentanyl Qual Urine Screen Negative Screen Negative    Opiates Urine Screen Negative Screen Negative    PCP Urine Screen Negative Screen Negative   Comprehensive metabolic panel    Collection Time: 06/26/24   9:27 PM   Result Value Ref Range    Sodium 136 135 - 145 mmol/L    Potassium 3.9 3.4 - 5.3 mmol/L    Carbon Dioxide (CO2) 29 22 - 29 mmol/L    Anion Gap 7 7 - 15 mmol/L    Urea Nitrogen 12.1 6.0 - 20.0 mg/dL    Creatinine 0.86 0.67 - 1.17 mg/dL    GFR Estimate >90 >60 mL/min/1.73m2    Calcium 9.3 8.6 - 10.0 mg/dL    Chloride 100 98 - 107 mmol/L    Glucose 110 (H) 70 - 99 mg/dL    Alkaline Phosphatase 75 40 - 150 U/L    AST 31 0 - 45 U/L    ALT 37 0 - 70 U/L    Protein Total 6.9 6.4 - 8.3 g/dL    Albumin 4.3 3.5 - 5.2 g/dL    Bilirubin Total 0.4 <=1.2 mg/dL   CK total    Collection Time: 06/26/24  9:27 PM   Result Value Ref Range     39 - 308 U/L   CBC with platelets and differential    Collection Time: 06/26/24  9:27 PM   Result Value Ref Range    WBC Count 7.5 4.0 - 11.0 10e3/uL    RBC Count 4.68 4.40 - 5.90 10e6/uL    Hemoglobin 14.3 13.3 - 17.7 g/dL    Hematocrit 42.4 40.0 - 53.0 %    MCV 91 78 - 100 fL    MCH 30.6 26.5 - 33.0 pg    MCHC 33.7 31.5 - 36.5 g/dL    RDW 13.0 10.0 - 15.0 %    Platelet Count 257 150 - 450 10e3/uL    % Neutrophils 61 %    % Lymphocytes 26 %    % Monocytes 11 %    % Eosinophils 2 %    % Basophils 0 %    % Immature Granulocytes 0 %    NRBCs per 100 WBC 0 <1 /100    Absolute Neutrophils 4.6 1.6 - 8.3 10e3/uL    Absolute Lymphocytes 1.9 0.8 - 5.3 10e3/uL    Absolute Monocytes 0.8 0.0 - 1.3 10e3/uL    Absolute Eosinophils 0.2 0.0 - 0.7 10e3/uL    Absolute Basophils 0.0 0.0 - 0.2 10e3/uL    Absolute Immature Granulocytes 0.0 <=0.4 10e3/uL    Absolute NRBCs 0.0 10e3/uL   Urine Drug Screen Panel    Collection Time: 06/26/24  9:29 PM   Result Value Ref Range    Amphetamines Urine Screen Negative Screen Negative    Barbituates Urine Screen Negative Screen Negative    Benzodiazepine Urine Screen Negative Screen Negative    Cannabinoids Urine Screen Negative Screen Negative    Cocaine Urine Screen Negative Screen Negative    Fentanyl Qual Urine Screen Negative Screen Negative    Opiates Urine  Screen Negative Screen Negative    PCP Urine Screen Negative Screen Negative   Symptomatic COVID-19 Virus (Coronavirus) by PCR Nasopharyngeal    Collection Time: 06/27/24  6:52 AM    Specimen: Nasopharyngeal; Swab   Result Value Ref Range    SARS CoV2 PCR Negative Negative   Lipid Profile    Collection Time: 06/29/24  7:55 AM   Result Value Ref Range    Cholesterol 209 (H) <200 mg/dL    Triglycerides 281 (H) <150 mg/dL    Direct Measure HDL 70 >=40 mg/dL    LDL Cholesterol Calculated 83 <=100 mg/dL    Non HDL Cholesterol 139 (H) <130 mg/dL   Hemoglobin A1c    Collection Time: 06/29/24  7:55 AM   Result Value Ref Range    Hemoglobin A1C 5.4 <5.7 %            Precautions:     Behavioral Orders   Procedures    Assault precautions    Code 1 - Restrict to Unit    Elopement precautions    Fall precautions    Routine Programming     As clinically indicated    Status 15     Every 15 minutes.    Status Individual Observation     Patient SIO 1:1 status reviewed with team/RN.  Please also refer to RN/team documentation for add'l detail.  2:1     -SIO staff to monitor following which have contributed to patient being on SIO:  Pt was observed pushing on security staff in the ER with intent of leaving the hospital.  -Possible interventions SIO staff could use to support patient's treatment progress:  Offer redirections   -When following observed, team will review discontinuation of SIO:  Able to sign in voluntary and contract for safety     Order Specific Question:   CONTINUOUS 24 hours / day     Answer:   Other     Order Specific Question:   Specify distance     Answer:   10     Order Specific Question:   Indications for SIO     Answer:   Assault risk     Order Specific Question:   Indications for SIO     Answer:   Severe intrusiveness    Suicide precautions: Suicide Risk: HIGH; Clinical rationale to override score: modification to the care environment     Patients on Suicide Precautions should have a Combination Diet ordered  that includes a Diet selection(s) AND a Behavioral Tray selection for Safe Tray - with utensils, or Safe Tray - NO utensils       Order Specific Question:   Suicide Risk     Answer:   HIGH     Order Specific Question:   Clinical rationale to override score:     Answer:   modification to the care environment            Psychiatric Examination:   Temp: 97  F (36.1  C) Temp src: Oral BP: 117/75 Pulse: 102     SpO2: 100 % O2 Device: None (Room air)    Weight is 178 lbs 0 oz  Body mass index is 22.85 kg/m .    Appearance: awake, alert and adequately groomed  Attitude:  cooperative and guarded  Eye Contact:  good  Mood:   labile   Affect:   changes depending on the current mood  Speech:  pressured speech  Psychomotor Behavior:  no evidence of tardive dyskinesia, dystonia, or tics  Throught Process:  linear  Associations:  no loose associations  Thought Content:  no evidence of suicidal ideation or homicidal ideation, no auditory hallucinations present, and no visual hallucinations present  Insight:  limited  Judgement:  limited  Oriented to:  time, person, and place  Attention Span and Concentration:  fair  Recent and Remote Memory:  fair           Precautions:     Behavioral Orders   Procedures    Assault precautions    Code 1 - Restrict to Unit    Elopement precautions    Fall precautions    Routine Programming     As clinically indicated    Status 15     Every 15 minutes.    Status Individual Observation     Patient SIO 1:1 status reviewed with team/RN.  Please also refer to RN/team documentation for add'l detail.  2:1     -SIO staff to monitor following which have contributed to patient being on SIO:  Pt was observed pushing on security staff in the ER with intent of leaving the hospital.  -Possible interventions SIO staff could use to support patient's treatment progress:  Offer redirections   -When following observed, team will review discontinuation of SIO:  Able to sign in voluntary and contract for safety      Order Specific Question:   CONTINUOUS 24 hours / day     Answer:   Other     Order Specific Question:   Specify distance     Answer:   10     Order Specific Question:   Indications for SIO     Answer:   Assault risk     Order Specific Question:   Indications for SIO     Answer:   Severe intrusiveness    Suicide precautions: Suicide Risk: HIGH; Clinical rationale to override score: modification to the care environment     Patients on Suicide Precautions should have a Combination Diet ordered that includes a Diet selection(s) AND a Behavioral Tray selection for Safe Tray - with utensils, or Safe Tray - NO utensils       Order Specific Question:   Suicide Risk     Answer:   HIGH     Order Specific Question:   Clinical rationale to override score:     Answer:   modification to the care environment          DIagnoses:   Schizoaffective disorder, bipolar type, current episode manic, severe, with psychosis  Stimulant use disorder, per his disorder, per history  Noncompliance noncompliance with treatment       Plan:   Medications:    -- Increase Depakote to 500, bid, for mood stabilization on 7/15. LFT and Valproic acid on 7/20  -- Continue gabapentin, 400 mg 3 times a day for anxiety and agitation  -- Nicotine patch, 21 mg every morning  -- Zyprexa, 10 mg twice a day for agitation and psychosis  -- Start Haldol 5 mg, bid for agitation, candie and psychosis  -- Vitamin D, 50 mcg at bedtime  -- Additional medications include B-52, Latuda, Zyprexa, trazodone    Medical:  --Internal medicine to follow up for medical problems     --Lab work was reviewed.  Abnormal results includes cholesterol 209, glucose 110, non-, triglyceride 281.  The rest of the results were normal.  U-Tox was negative.      --SIO for severe intrusiveness  --Cheeking precautions  --Care was coordinated with the treatment team.   --The patient was consulted on nature of illness and treatment options.      Disposition Plan   Reason for ongoing  admission: is unable to care for self due to severe psychosis or candie  Discharge location: IRTS facility  Discharge Medications: not ordered  Follow-up Appointments: not scheduled    Legal Status:  Court hold as of 3:51 on 24  Continuing hold order was received late in the day.     County: [facility]  File Number: [id number]  Start and expiration date of commitment:       Final Hearin/15 @ 9:45, does not need provider testimony     Court  asked for DA. DA was sent 7/10/24.  Neha Humphries  Senior   [facility] Behavioral Health Assessment/Intake  Office: [phone] Fax: [phone]  [email or web address], [address], [address] [address]     Contacts:      HUSSEIN Andersen [facility] Centeroumarscottie  [facility] Attorney: Victoriasilverio Estevezw @ [phone]     CLIFTON for aunt.Aunt:Nita @ [phone])  Father:Subhash @ [phone]  Discharge will be granted once symptoms improved.    Kaden RIVERA, CNP    This note was created with the help of Dragon dictation system. All grammatical/typing errors or context distortion are unintentional and inherent to software.

## 2024-07-18 NOTE — PLAN OF CARE
BEH IP Unit Acuity Rating Score (UARS)  Patient is given one point for every criteria they meet.     CRITERIA SCORING   On a 72 hour hold, court hold, committed, stay of commitment, or revocation. 1    Patient LOS on BEH unit exceeds 20 days. 1  LOS: 21    Patient under guardianship, 55+, otherwise medically complex, or under age 11. 0   Suicide ideation without relief of precipitating factors. 0   Current plan for suicide. 0   Current plan for homicide. 0   Imminent risk or actual attempt to seriously harm another without relief of factors precipitating the attempt. 0   Severe dysfunction in daily living (ex: complete neglect for self care, extreme disruption in vegetative function, extreme deterioration in social interactions). 1   Recent (last 7 days) or current physical aggression in the ED or on unit. 0   Restraints or seclusion episode in past 72 hours. 0   Recent (last 7 days) or current verbal aggression, agitation, yelling, etc., while in the ED or unit. 0      Active psychosis. 1   Need for constant or near constant redirection (from leaving, from others, etc).  1   Intrusive or disruptive behaviors. 1   Patient requires 3 or more hours of individualized nursing care per 8-hour shift (i.e. for ADLs, meds, therapeutic interventions). 1   TOTAL 7

## 2024-07-18 NOTE — PLAN OF CARE
Pt appeared to sleep for a total of 6.5 hours overnight. No PRN medications were administered, and no concerns were noted.     Problem: Manic or Hypomanic Signs/Symptoms  Goal: Improved Sleep (Manic/Hypomanic Signs/Symptoms)  Outcome: Progressing

## 2024-07-18 NOTE — PLAN OF CARE
"Goal Outcome Evaluation:    Patient continues on SIO 1:1 status at 10 ft for Assault Risk and Severe Intrusiveness. Presents with an bright/animated affect. Reports mood as \"I'm great.\" Is pleasant, polite, calm, and cooperative; yet is restless. Thought content presents as preoccupation regarding discharge. Thought process presents as disorganized and flight of ideas. Speech presents as circumstantial, flight of ideas, and rambling. Eye contact is good. Patient denies depression, anxiety, suicidal ideation, SIB, homicidal ideation, and auditory/visual hallucinations. No medical issues noted. Vital signs stable. Patient was medication compliant. Attended group this shift. Ate his evening meal. Patient out in the milieu this shift walking the hallway with SIO 1:1 staff in attendance. Social with staff. Patient had visitors this shift. Visit went well.    /74 (BP Location: Left arm, Patient Position: Sitting, Cuff Size: Adult Regular)   Pulse 92   Temp 98.2  F (36.8  C) (Oral)   Resp 16   Ht 1.88 m (6' 2\")   Wt 80.7 kg (178 lb)   SpO2 99%   BMI 22.85 kg/m                     "

## 2024-07-18 NOTE — PLAN OF CARE
Team Note Due:  Friday    Assessment/Intervention/Current Symtoms and Care Coordination:  Chart review and met with team, discussed pt progress, symptomology, and response to treatment.  Discussed the discharge plan and any potential impediments to discharge.    Pt had final hearing and we are waiting for comittment order and [place] order.   Also waiting for mental health case management assignment to start developng a plan.    Discharge Plan or Goal:  IRTS  OR  HARVEY treatment         Barriers to Discharge:  Pt is ill and does not have providers     Referral Status:  Too early  Pt has no established providers       Medical Assistance  MA is now active.  Insurance Company Name [place]ICA[place] (Elig Type AX: [place] Early Expansion)  PMI # [id number]  [facility]      Legal Status:  Court hold as of 3:51 on 7/5/24 7/12/24  Continuing hold order was received late in the day.    [facility]: [facility]  File Number: [id number]  Start and expiration date of commitment:       Court  asked for DA. DA was sent 7/10/24. Waiting for case management assignment.  Neha Humphries  Senior   [facility] Behavioral Health Assessment/Intake  Office: [phone] Fax: [phone]  [email or web address], [address], [address]  [address] [address]      Contacts:      HUSSEIN Kolher  [facility] Attorney: Victoria Christianson @ [phone]    CLIFTON for aunt.Aunt:Nita @ [phone])  Father:Subhash @ [phone]        Upcoming Meetings and Dates/Important Information and next steps:      Waiting for commitment and [place] order    Pt will need provisional discharge at the time of discharge.

## 2024-07-18 NOTE — PLAN OF CARE
Goal Outcome Evaluation:    Plan of Care Reviewed With: patient        Pt continues to be on a one to one  this shift. He was calm and cooperative on approach, stated that he slept well last night. He denied pain or discomfort, denied SI,HI and he contracted for safety. Pt denied hearing voices. There has been no running in the halls and any hyper verbal concerns. He has spent most of his day sleeping in his room. He is eating and drinking adequately. Pt has valproic acid and hepatic panel labs to be drawn on 7/20/24. He offered no complaints and no other behavioral issues were noted.

## 2024-07-19 PROCEDURE — 250N000013 HC RX MED GY IP 250 OP 250 PS 637: Performed by: NURSE PRACTITIONER

## 2024-07-19 PROCEDURE — 250N000013 HC RX MED GY IP 250 OP 250 PS 637: Performed by: CLINICAL NURSE SPECIALIST

## 2024-07-19 PROCEDURE — 99232 SBSQ HOSP IP/OBS MODERATE 35: CPT | Performed by: NURSE PRACTITIONER

## 2024-07-19 PROCEDURE — 250N000013 HC RX MED GY IP 250 OP 250 PS 637: Performed by: PSYCHIATRY & NEUROLOGY

## 2024-07-19 PROCEDURE — 124N000002 HC R&B MH UMMC

## 2024-07-19 RX ADMIN — DIVALPROEX SODIUM 500 MG: 500 TABLET, FILM COATED, EXTENDED RELEASE ORAL at 09:05

## 2024-07-19 RX ADMIN — NICOTINE POLACRILEX 4 MG: 2 LOZENGE ORAL at 16:01

## 2024-07-19 RX ADMIN — NICOTINE 1 PATCH: 21 PATCH, EXTENDED RELEASE TRANSDERMAL at 09:07

## 2024-07-19 RX ADMIN — HALOPERIDOL 5 MG: 5 TABLET ORAL at 09:05

## 2024-07-19 RX ADMIN — GABAPENTIN 400 MG: 400 CAPSULE ORAL at 14:52

## 2024-07-19 RX ADMIN — OLANZAPINE 15 MG: 10 TABLET, ORALLY DISINTEGRATING ORAL at 17:46

## 2024-07-19 RX ADMIN — ACETAMINOPHEN 650 MG: 325 TABLET, FILM COATED ORAL at 16:04

## 2024-07-19 RX ADMIN — GABAPENTIN 400 MG: 400 CAPSULE ORAL at 09:05

## 2024-07-19 RX ADMIN — GABAPENTIN 400 MG: 400 CAPSULE ORAL at 17:48

## 2024-07-19 RX ADMIN — OLANZAPINE 15 MG: 10 TABLET, ORALLY DISINTEGRATING ORAL at 09:05

## 2024-07-19 RX ADMIN — HALOPERIDOL 5 MG: 5 TABLET ORAL at 17:47

## 2024-07-19 RX ADMIN — Medication 50 MCG: at 09:05

## 2024-07-19 RX ADMIN — DIVALPROEX SODIUM 500 MG: 500 TABLET, FILM COATED, EXTENDED RELEASE ORAL at 17:47

## 2024-07-19 RX ADMIN — NICOTINE POLACRILEX 4 MG: 2 LOZENGE ORAL at 18:09

## 2024-07-19 ASSESSMENT — ACTIVITIES OF DAILY LIVING (ADL)
ADLS_ACUITY_SCORE: 40
ADLS_ACUITY_SCORE: 41
ORAL_HYGIENE: INDEPENDENT
DRESS: INDEPENDENT
ADLS_ACUITY_SCORE: 41
HYGIENE/GROOMING: HANDWASHING;INDEPENDENT;SHOWER
HYGIENE/GROOMING: INDEPENDENT
ADLS_ACUITY_SCORE: 41
ADLS_ACUITY_SCORE: 40
ADLS_ACUITY_SCORE: 41
ADLS_ACUITY_SCORE: 40
ADLS_ACUITY_SCORE: 40
ADLS_ACUITY_SCORE: 41
LAUNDRY: UNABLE TO COMPLETE
ADLS_ACUITY_SCORE: 41
ADLS_ACUITY_SCORE: 40
DRESS: SCRUBS (BEHAVIORAL HEALTH);INDEPENDENT
LAUNDRY: UNABLE TO COMPLETE
ORAL_HYGIENE: INDEPENDENT
ADLS_ACUITY_SCORE: 40
ADLS_ACUITY_SCORE: 41
ADLS_ACUITY_SCORE: 40
ADLS_ACUITY_SCORE: 41
ADLS_ACUITY_SCORE: 40
ADLS_ACUITY_SCORE: 40

## 2024-07-19 NOTE — PLAN OF CARE
Goal Outcome Evaluation:    Plan of Care Reviewed With: patient    Pt spent almost all shift in his room resting in bed. Pt was up for meals then quickly returned to bed.  Pt declining check in as too tired. Pt ate 100% of both meals. Pt denied any suicidal thoughts or auditory hallucinations. Pt continues on SIO 1:1 for intrusive behavior. Pt did not require any redirection this shift as pt has been with drawn and isolative and resting in bed all shift.

## 2024-07-19 NOTE — PLAN OF CARE
Group Attendance:  attended partial group    Time session began: 1700  Time session ended: 1745  Patient's total time in group: 10    Total # Attendees 8   Group Type psychotherapeutic     Group Topic Covered insight improvement, incorporating skill sets, and healthy coping skills  Wise Mind, Effectiveness: focus on what works, Balancing priorities and demands      Group Session Detail CTC-Group members completed/discussed worksheet on reflecting on their day including things that meant a lot, things that did not go as expected, skills used to manage, 1 participant started, and 1 thing participant finished.      Patient's response to the group topic/interactions:  Limited eye contact, distracted , appeared confused , and inattentive     Patient Details: Patient presented to group was pleasant and checked in feeling low energy and unable to get his energy up.           No Charge (0-15 min)    Patient Active Problem List   Diagnosis    Anxiety    Adjustment disorder with anxious mood    Polysubstance abuse (H)    Manic behavior (H)    Psychosis (H)    Bipolar affective disorder, current episode manic (H)    Schizoaffective disorder, bipolar type (H)    Suicidal ideation    Agitation    Itzel (H)

## 2024-07-19 NOTE — PLAN OF CARE
"Goal Outcome Evaluation:    Patient continues on SIO 1:1 status at 10 ft for Assault Risk and Severe Intrusiveness. Presents with an bright/animated affect. Reports mood as \"I'm fabulous.\" Is pleasant, polite, calm, and cooperative; yet is restless. Thought content presents as preoccupation regarding discharge. Thought process presents as disorganized and flight of ideas. Speech presents as circumstantial, flight of ideas, and rambling. Eye contact is good. Patient denies depression, anxiety, suicidal ideation, SIB, homicidal ideation, and auditory/visual hallucinations. No medical issues noted. Vital signs stable. Patient was medication compliant. Ate his evening meal. Patient out in the milieu occasionally this shift walking the hallway; and watching T.V, and movies with SIO 1:1 staff in attendance. Social with staff.     /75   Pulse 100   Temp 98.2  F (36.8  C) (Oral)   Resp 16   Ht 1.88 m (6' 2\")   Wt 80.7 kg (178 lb)   SpO2 99%   BMI 22.85 kg/m                     "

## 2024-07-19 NOTE — PLAN OF CARE
07/19/24 1421   Individualization/Patient Specific Goals   Patient Personal Strengths family/social support   Patient Vulnerabilities housing insecurity;lacks insight into illness;poor impulse control;occupational insecurity;history of unsuccessful treatment;substance abuse/addiction   Anxieties, Fears or Concerns pt wants to be discharged   Individualized Care Needs Pt needs to be assessed when off 1:1 for appropriate for  IRTS referrals   Patient/Family-Specific Goals (Include Timeframe) Stabilize and return to the community   Interprofessional Rounds   Summary Pt was admitted for behavioral disturbances and drug use. He has completed final hearing and we are waiting for court order for committment and [place].   Participants nursing;psychiatrist;CTC;pharmacy;psychotherapy   Behavioral Team Discussion   Participants Lucie RIVERA, Ginny HENRIQUEZ, Duy Prince RN, pharmacy staff, Jase Martinez LMFT   Progress Symtpoms are improving, Pt is still on a 1:1 and there is discussion about taking him off. He issleeping a lot.   Anticipated length of stay 3 weeks   Continued Stay Criteria/Rationale the pt is unable to care for himself in the community   Medical/Physical no active issues were discussed   Precautions suicide, fall, assault elopement   Plan wait for committment with [place] order   Rationale for change in precautions or plan na   Safety Plan will be completed by unit therapist   Anticipated Discharge Disposition IRTS

## 2024-07-19 NOTE — PLAN OF CARE
Team Note Due:  Friday    Assessment/Intervention/Current Symtoms and Care Coordination:  Chart review and met with team, discussed pt progress, symptomology, and response to treatment.  Discussed the discharge plan and any potential impediments to discharge.    Pt had final hearing and we are waiting for comittment order and [place] order.   Also waiting for mental health case management assignment to start developng a plan.    Lloyd is discussing readiness of the patient to be referred to IRTS at this time.  RN staff and CTC do not believe he is ready yet.  He will be tried off 1:1 starting tomorrow morning and we will reassess.  He seems to be sleeping quite a bit.    Discharge Plan or Goal:  [facility]TS  OR  [facility] treatment         Barriers to Discharge:  Pt is ill and does not have providers     Referral Status:  Too early  Pt has no established providers       Medical Assistance  MA is now active.  Insurance Company Name [place]ICA[place] (Elig Type AX: [place] Early Expansion)  PMI # [id number]  [facility]      Legal Status:  Court hold as of 3:51 on 7/5/24 7/12/24  Continuing hold order was received late in the day.    [facility]: [facility]  File Number: [id number]  Start and expiration date of commitment:       Court  asked for DA. DA was sent 7/10/24. Waiting for case management assignment.  Neha Humphries  Senior   [facility] Behavioral Health Assessment/Intake  Office: [phone] Fax: [phone]  [email or web address], [email or web address], [facility] [place]  [address] [address]      Contacts:      HUSSEIN Kohler  [facility] Attorney: Victoria Christianson @ [phone]    CLIFTON for aunt.Aunt:Nita @ [phone])  Father:Subhash @ [phone]        Upcoming Meetings and Dates/Important Information and next steps:      Waiting for commitment and [place] order    Pt will need provisional discharge at the time of discharge.    Will refer to IRTS when less symptomatic.

## 2024-07-19 NOTE — PROGRESS NOTES
"[facility], [place]   Psychiatric Progress Note        Interim History:   Team meeting report: The patient's care was discussed with the treatment team during the daily team meeting and/or staff's chart notes were reviewed.  Staff reports the patient has been mostly calm, pleasant and cooperative. He is feeling \"great\". He hasn't been running out in the ledbetter as he usually does. No anger outburst. Evening reported the patient continues to be disorganized, tangential, and hyper focus on discharge. He has been pleasant and polite. No behavioral issues. Med compliant. Attended one group. Eating well. Slept wwell. No PRN's given.     Met with the patient. He is superexcited to see me. Wants to discharge. Talked about going to [facility] which is some type off treatment facility. He talked to his anti who tod him that her house will always be a safe place for him. He seems to think that she will take him under his roof. Gave me a permission to call her, however, there is no emergency contact listed in the demographics.  Informed the patient that he will remain in the hospital until we know he has a safe place to go. He is not happy about it, stating the court told him that it is up to the provider to discharge him. Raised his voice several times but was not threatening. Decline to discuss anything else with this provider.     The patient will remain on SIO, due to intrusiveness and labile mood.         Medications:     Current Facility-Administered Medications   Medication Dose Route Frequency Provider Last Rate Last Admin    divalproex sodium extended-release (DEPAKOTE ER) 24 hr tablet 500 mg  500 mg Oral BID Kaden Daniels APRN CNP   500 mg at 07/19/24 0905    gabapentin (NEURONTIN) capsule 400 mg  400 mg Oral TID Jese Arizmendi MD   400 mg at 07/19/24 0905    haloperidol (HALDOL) tablet 5 mg  5 mg Oral BID Kaden Daniels APRN CNP   5 mg at 07/19/24 0905 "    nicotine (NICODERM CQ) 21 MG/24HR 24 hr patch 1 patch  1 patch Transdermal Daily Jese Arizmendi MD   1 patch at 07/19/24 0907    OLANZapine zydis (zyPREXA) ODT tab 15 mg  15 mg Oral BID Jese Arizmendi MD   15 mg at 07/19/24 0905    Vitamin D3 (CHOLECALCIFEROL) tablet 50 mcg  50 mcg Oral At Bedtime Maria Pennington APRN CNP   50 mcg at 07/19/24 0905            Allergies:     Allergies   Allergen Reactions    Paliperidone Other (See Comments)     Other reaction(s): Extrapyramidal Symptoms   Significant EPS    Significant EPS    Pork Allergy Other (See Comments)     Catholic prohibition            Labs:     Recent Results (from the past 672 hour(s))   Urine Drug Screen Panel    Collection Time: 06/22/24  8:31 PM   Result Value Ref Range    Amphetamines Urine Screen Negative Screen Negative    Barbituates Urine Screen Negative Screen Negative    Benzodiazepine Urine Screen Negative Screen Negative    Cannabinoids Urine Screen Negative Screen Negative    Cocaine Urine Screen Negative Screen Negative    Fentanyl Qual Urine Screen Negative Screen Negative    Opiates Urine Screen Negative Screen Negative    PCP Urine Screen Negative Screen Negative   Urine Drug Screen Panel    Collection Time: 06/25/24 11:36 PM   Result Value Ref Range    Amphetamines Urine Screen Negative Screen Negative    Barbituates Urine Screen Negative Screen Negative    Benzodiazepine Urine Screen Negative Screen Negative    Cannabinoids Urine Screen Negative Screen Negative    Cocaine Urine Screen Negative Screen Negative    Fentanyl Qual Urine Screen Negative Screen Negative    Opiates Urine Screen Negative Screen Negative    PCP Urine Screen Negative Screen Negative   Comprehensive metabolic panel    Collection Time: 06/26/24  9:27 PM   Result Value Ref Range    Sodium 136 135 - 145 mmol/L    Potassium 3.9 3.4 - 5.3 mmol/L    Carbon Dioxide (CO2) 29 22 - 29 mmol/L    Anion Gap 7 7 - 15 mmol/L    Urea Nitrogen 12.1 6.0 - 20.0  mg/dL    Creatinine 0.86 0.67 - 1.17 mg/dL    GFR Estimate >90 >60 mL/min/1.73m2    Calcium 9.3 8.6 - 10.0 mg/dL    Chloride 100 98 - 107 mmol/L    Glucose 110 (H) 70 - 99 mg/dL    Alkaline Phosphatase 75 40 - 150 U/L    AST 31 0 - 45 U/L    ALT 37 0 - 70 U/L    Protein Total 6.9 6.4 - 8.3 g/dL    Albumin 4.3 3.5 - 5.2 g/dL    Bilirubin Total 0.4 <=1.2 mg/dL   CK total    Collection Time: 06/26/24  9:27 PM   Result Value Ref Range     39 - 308 U/L   CBC with platelets and differential    Collection Time: 06/26/24  9:27 PM   Result Value Ref Range    WBC Count 7.5 4.0 - 11.0 10e3/uL    RBC Count 4.68 4.40 - 5.90 10e6/uL    Hemoglobin 14.3 13.3 - 17.7 g/dL    Hematocrit 42.4 40.0 - 53.0 %    MCV 91 78 - 100 fL    MCH 30.6 26.5 - 33.0 pg    MCHC 33.7 31.5 - 36.5 g/dL    RDW 13.0 10.0 - 15.0 %    Platelet Count 257 150 - 450 10e3/uL    % Neutrophils 61 %    % Lymphocytes 26 %    % Monocytes 11 %    % Eosinophils 2 %    % Basophils 0 %    % Immature Granulocytes 0 %    NRBCs per 100 WBC 0 <1 /100    Absolute Neutrophils 4.6 1.6 - 8.3 10e3/uL    Absolute Lymphocytes 1.9 0.8 - 5.3 10e3/uL    Absolute Monocytes 0.8 0.0 - 1.3 10e3/uL    Absolute Eosinophils 0.2 0.0 - 0.7 10e3/uL    Absolute Basophils 0.0 0.0 - 0.2 10e3/uL    Absolute Immature Granulocytes 0.0 <=0.4 10e3/uL    Absolute NRBCs 0.0 10e3/uL   Urine Drug Screen Panel    Collection Time: 06/26/24  9:29 PM   Result Value Ref Range    Amphetamines Urine Screen Negative Screen Negative    Barbituates Urine Screen Negative Screen Negative    Benzodiazepine Urine Screen Negative Screen Negative    Cannabinoids Urine Screen Negative Screen Negative    Cocaine Urine Screen Negative Screen Negative    Fentanyl Qual Urine Screen Negative Screen Negative    Opiates Urine Screen Negative Screen Negative    PCP Urine Screen Negative Screen Negative   Symptomatic COVID-19 Virus (Coronavirus) by PCR Nasopharyngeal    Collection Time: 06/27/24  6:52 AM    Specimen:  Nasopharyngeal; Swab   Result Value Ref Range    SARS CoV2 PCR Negative Negative   Lipid Profile    Collection Time: 06/29/24  7:55 AM   Result Value Ref Range    Cholesterol 209 (H) <200 mg/dL    Triglycerides 281 (H) <150 mg/dL    Direct Measure HDL 70 >=40 mg/dL    LDL Cholesterol Calculated 83 <=100 mg/dL    Non HDL Cholesterol 139 (H) <130 mg/dL   Hemoglobin A1c    Collection Time: 06/29/24  7:55 AM   Result Value Ref Range    Hemoglobin A1C 5.4 <5.7 %            Precautions:     Behavioral Orders   Procedures    Assault precautions    Code 1 - Restrict to Unit    Elopement precautions    Fall precautions    Routine Programming     As clinically indicated    Status 15     Every 15 minutes.    Status Individual Observation     Patient SIO 1:1 status reviewed with team/RN.  Please also refer to RN/team documentation for add'l detail.  2:1     -SIO staff to monitor following which have contributed to patient being on SIO:  Pt was observed pushing on security staff in the ER with intent of leaving the hospital.  -Possible interventions SIO staff could use to support patient's treatment progress:  Offer redirections   -When following observed, team will review discontinuation of SIO:  Able to sign in voluntary and contract for safety     Order Specific Question:   CONTINUOUS 24 hours / day     Answer:   Other     Order Specific Question:   Specify distance     Answer:   10     Order Specific Question:   Indications for SIO     Answer:   Assault risk     Order Specific Question:   Indications for SIO     Answer:   Severe intrusiveness    Suicide precautions: Suicide Risk: HIGH; Clinical rationale to override score: modification to the care environment     Patients on Suicide Precautions should have a Combination Diet ordered that includes a Diet selection(s) AND a Behavioral Tray selection for Safe Tray - with utensils, or Safe Tray - NO utensils       Order Specific Question:   Suicide Risk     Answer:   HIGH      Order Specific Question:   Clinical rationale to override score:     Answer:   modification to the care environment            Psychiatric Examination:   Temp: 97.7  F (36.5  C) Temp src: Oral BP: 114/74 Pulse: 92     SpO2: 99 % O2 Device: None (Room air)    Weight is 178 lbs 0 oz  Body mass index is 22.85 kg/m .    Appearance: awake, alert and adequately groomed  Attitude:  cooperative and guarded  Eye Contact:  good  Mood:   labile   Affect:   changes depending on the current mood  Speech:  pressured speech  Psychomotor Behavior:  no evidence of tardive dyskinesia, dystonia, or tics  Throught Process:  linear  Associations:  no loose associations  Thought Content:  no evidence of suicidal ideation or homicidal ideation, no auditory hallucinations present, and no visual hallucinations present  Insight:  limited  Judgement:  limited  Oriented to:  time, person, and place  Attention Span and Concentration:  fair  Recent and Remote Memory:  fair           Precautions:     Behavioral Orders   Procedures    Assault precautions    Code 1 - Restrict to Unit    Elopement precautions    Fall precautions    Routine Programming     As clinically indicated    Status 15     Every 15 minutes.    Status Individual Observation     Patient SIO 1:1 status reviewed with team/RN.  Please also refer to RN/team documentation for add'l detail.  2:1     -SIO staff to monitor following which have contributed to patient being on SIO:  Pt was observed pushing on security staff in the ER with intent of leaving the hospital.  -Possible interventions SIO staff could use to support patient's treatment progress:  Offer redirections   -When following observed, team will review discontinuation of SIO:  Able to sign in voluntary and contract for safety     Order Specific Question:   CONTINUOUS 24 hours / day     Answer:   Other     Order Specific Question:   Specify distance     Answer:   10     Order Specific Question:   Indications for SIO      Answer:   Assault risk     Order Specific Question:   Indications for SIO     Answer:   Severe intrusiveness    Suicide precautions: Suicide Risk: HIGH; Clinical rationale to override score: modification to the care environment     Patients on Suicide Precautions should have a Combination Diet ordered that includes a Diet selection(s) AND a Behavioral Tray selection for Safe Tray - with utensils, or Safe Tray - NO utensils       Order Specific Question:   Suicide Risk     Answer:   HIGH     Order Specific Question:   Clinical rationale to override score:     Answer:   modification to the care environment          DIagnoses:   Schizoaffective disorder, bipolar type, current episode manic, severe, with psychosis  Stimulant use disorder, per his disorder, per history  Noncompliance noncompliance with treatment       Plan:   Medications:    -- Increase Depakote to 500, bid, for mood stabilization on 7/15. LFT and Valproic acid on 7/20  -- Continue gabapentin, 400 mg 3 times a day for anxiety and agitation  -- Nicotine patch, 21 mg every morning  -- Zyprexa, 10 mg twice a day for agitation and psychosis  -- Start Haldol 5 mg, bid for agitation, candie and psychosis  -- Vitamin D, 50 mcg at bedtime  -- Additional medications include B-52, Latuda, Zyprexa, trazodone    Medical:  --Internal medicine to follow up for medical problems     --Lab work was reviewed.  Abnormal results includes cholesterol 209, glucose 110, non-, triglyceride 281.  The rest of the results were normal.  U-Tox was negative.      --SIO for severe intrusiveness  --Cheeking precautions  --Care was coordinated with the treatment team.   --The patient was consulted on nature of illness and treatment options.      Disposition Plan   Reason for ongoing admission: is unable to care for self due to severe psychosis or candie  Discharge location: [facility] facility  Discharge Medications: not ordered  Follow-up Appointments: not scheduled    Legal  Status:  Court hold as of 3:51 on 24  Continuing hold order was received late in the day.     [facility]: [facility]  File Number: [id number]  Start and expiration date of commitment:       Final Hearin/15 @ 9:45, does not need provider testimony     Court  asked for DA. DA was sent 7/10/24.  Neha Humphries  Senior   [facility] Behavioral Health Assessment/Intake  Office: [phone] Fax: [phone]  [email or web address], [email or web address],   [address] [address]     Contacts:      HUSSEIN Kohler  [facility] Attorney: Victoria Christianson @ [phone]     CLIFTON for aunt.Aunt:Nita @ [phone])  Father:Subhash @ [phone]  Discharge will be granted once symptoms improved.    Kaden RIVERA, CNP    This note was created with the help of Dragon dictation system. All grammatical/typing errors or context distortion are unintentional and inherent to software.

## 2024-07-19 NOTE — PLAN OF CARE
BEH IP Unit Acuity Rating Score (UARS)  Patient is given one point for every criteria they meet.     CRITERIA SCORING   On a 72 hour hold, court hold, committed, stay of commitment, or revocation. 1    Patient LOS on BEH unit exceeds 20 days. 1  LOS: 22    Patient under guardianship, 55+, otherwise medically complex, or under age 11. 0   Suicide ideation without relief of precipitating factors. 0   Current plan for suicide. 0   Current plan for homicide. 0   Imminent risk or actual attempt to seriously harm another without relief of factors precipitating the attempt. 0   Severe dysfunction in daily living (ex: complete neglect for self care, extreme disruption in vegetative function, extreme deterioration in social interactions). 1   Recent (last 7 days) or current physical aggression in the ED or on unit. 0   Restraints or seclusion episode in past 72 hours. 0   Recent (last 7 days) or current verbal aggression, agitation, yelling, etc., while in the ED or unit. 0      Active psychosis. 1   Need for constant or near constant redirection (from leaving, from others, etc).  1   Intrusive or disruptive behaviors. 1   Patient requires 3 or more hours of individualized nursing care per 8-hour shift (i.e. for ADLs, meds, therapeutic interventions). 1   TOTAL 7

## 2024-07-20 PROCEDURE — 250N000013 HC RX MED GY IP 250 OP 250 PS 637: Performed by: NURSE PRACTITIONER

## 2024-07-20 PROCEDURE — 250N000013 HC RX MED GY IP 250 OP 250 PS 637: Performed by: PSYCHIATRY & NEUROLOGY

## 2024-07-20 PROCEDURE — 250N000013 HC RX MED GY IP 250 OP 250 PS 637: Performed by: CLINICAL NURSE SPECIALIST

## 2024-07-20 PROCEDURE — 124N000002 HC R&B MH UMMC

## 2024-07-20 RX ADMIN — OLANZAPINE 15 MG: 10 TABLET, ORALLY DISINTEGRATING ORAL at 08:43

## 2024-07-20 RX ADMIN — GABAPENTIN 400 MG: 400 CAPSULE ORAL at 18:17

## 2024-07-20 RX ADMIN — Medication 50 MCG: at 08:43

## 2024-07-20 RX ADMIN — OLANZAPINE 15 MG: 10 TABLET, ORALLY DISINTEGRATING ORAL at 18:17

## 2024-07-20 RX ADMIN — DIVALPROEX SODIUM 500 MG: 500 TABLET, FILM COATED, EXTENDED RELEASE ORAL at 08:43

## 2024-07-20 RX ADMIN — NICOTINE POLACRILEX 4 MG: 2 LOZENGE ORAL at 16:59

## 2024-07-20 RX ADMIN — HALOPERIDOL 5 MG: 5 TABLET ORAL at 18:17

## 2024-07-20 RX ADMIN — DIVALPROEX SODIUM 500 MG: 500 TABLET, FILM COATED, EXTENDED RELEASE ORAL at 18:18

## 2024-07-20 RX ADMIN — NICOTINE POLACRILEX 4 MG: 2 LOZENGE ORAL at 19:51

## 2024-07-20 RX ADMIN — GABAPENTIN 400 MG: 400 CAPSULE ORAL at 13:50

## 2024-07-20 RX ADMIN — NICOTINE 1 PATCH: 21 PATCH, EXTENDED RELEASE TRANSDERMAL at 08:53

## 2024-07-20 RX ADMIN — HALOPERIDOL 5 MG: 5 TABLET ORAL at 08:43

## 2024-07-20 RX ADMIN — GABAPENTIN 400 MG: 400 CAPSULE ORAL at 08:42

## 2024-07-20 ASSESSMENT — ACTIVITIES OF DAILY LIVING (ADL)
ADLS_ACUITY_SCORE: 41
HYGIENE/GROOMING: HANDWASHING;SHOWER;INDEPENDENT
DRESS: INDEPENDENT
ADLS_ACUITY_SCORE: 41
ADLS_ACUITY_SCORE: 41
LAUNDRY: UNABLE TO COMPLETE
DRESS: INDEPENDENT;SCRUBS (BEHAVIORAL HEALTH)
ADLS_ACUITY_SCORE: 41
ADLS_ACUITY_SCORE: 41
LAUNDRY: UNABLE TO COMPLETE
ADLS_ACUITY_SCORE: 41
HYGIENE/GROOMING: INDEPENDENT
ADLS_ACUITY_SCORE: 41
ORAL_HYGIENE: INDEPENDENT
ADLS_ACUITY_SCORE: 41
ORAL_HYGIENE: INDEPENDENT
ADLS_ACUITY_SCORE: 41

## 2024-07-20 NOTE — PLAN OF CARE
"Goal Outcome Evaluation:    Patient presents with a bright/animated affect. Reports mood as \"I'm good.\" Is pleasant, polite, calm, and cooperative; yet is restless. Thought content presents as preoccupation regarding discharge. Thought process presents as relevant. Speech presents as clear, coherent, and spontaneous. Eye contact is good. Patient denies depression, anxiety, suicidal ideation, SIB, homicidal ideation, and auditory/visual hallucinations. No medical issues noted. HR was elevated with all other vital signs stable (see Vitals Flowsheet for details). Patient was medication compliant. Did not attend group this shift. Ate his evening meal. Patient out in the milieu occasionally this shift walking the hallway; and watching T.V, and movies. Social with staff.     /74 (BP Location: Left leg)   Pulse 112   Temp 97.8  F (36.6  C) (Oral)   Resp 16   Ht 1.88 m (6' 2\")   Wt 80.7 kg (178 lb)   SpO2 99%   BMI 22.85 kg/m      SIO 1:1 was discontinued on Day Shift today Saturday 7/20.                   "

## 2024-07-20 NOTE — PLAN OF CARE
Problem: Adult Behavioral Health Plan of Care  Goal: Adheres to Safety Considerations for Self and Others  Outcome: Progressing     Problem: Anxiety  Goal: Anxiety Reduction or Resolution  Outcome: Progressing     Problem: Sleep Disturbance  Goal: Adequate Sleep/Rest  Outcome: Progressing   Goal Outcome Evaluation:    Patient continues to be on SIO 1:1, 10 Feet due to assault risk ,and severe intrusiveness. Patient remained in his room most of the time sleeping. No pain, depression, anxiety or other mental illness symptoms reported/ observed. Patient slept for 7 hours this shift. No other concerns at this time, team will continue to implement plan of care.

## 2024-07-20 NOTE — PLAN OF CARE
Goal Outcome Evaluation:    Plan of Care Reviewed With: patient        Pt spent almost all shift in his room sleeping or resting in bed. Pt was out briefly for his breakfast and lunch, returning to his bed immediately after eating. Pt started the day with SIO that was discontinued this morning. Pt contracted for safe behavior. Pt stated is hopeful about discharge on Monday. Pt refused his blood draw this morning. Pt said that he would agree to blood draw when he was being discharged.  Writer informed pt that Dr needs to know Depakote level to manage the medication so can assess readiness for discharge. Pt agreed to allow blood draw tomorrow. Pt declines check in. Pt states he just wants to sleep all day.  Pt denied any suicidal thoughts denied hallucinations.

## 2024-07-21 LAB
ALBUMIN SERPL BCG-MCNC: 4 G/DL (ref 3.5–5.2)
ALP SERPL-CCNC: 88 U/L (ref 40–150)
ALT SERPL W P-5'-P-CCNC: 16 U/L (ref 0–70)
AST SERPL W P-5'-P-CCNC: 18 U/L (ref 0–45)
BILIRUB DIRECT SERPL-MCNC: <0.2 MG/DL (ref 0–0.3)
BILIRUB SERPL-MCNC: 0.2 MG/DL
PROT SERPL-MCNC: 6.7 G/DL (ref 6.4–8.3)
VALPROATE SERPL-MCNC: 58.8 UG/ML

## 2024-07-21 PROCEDURE — 250N000013 HC RX MED GY IP 250 OP 250 PS 637: Performed by: CLINICAL NURSE SPECIALIST

## 2024-07-21 PROCEDURE — 250N000013 HC RX MED GY IP 250 OP 250 PS 637: Performed by: PSYCHIATRY & NEUROLOGY

## 2024-07-21 PROCEDURE — 80164 ASSAY DIPROPYLACETIC ACD TOT: CPT | Performed by: PSYCHIATRY & NEUROLOGY

## 2024-07-21 PROCEDURE — 36415 COLL VENOUS BLD VENIPUNCTURE: CPT | Performed by: PSYCHIATRY & NEUROLOGY

## 2024-07-21 PROCEDURE — 250N000013 HC RX MED GY IP 250 OP 250 PS 637: Performed by: NURSE PRACTITIONER

## 2024-07-21 PROCEDURE — 124N000002 HC R&B MH UMMC

## 2024-07-21 PROCEDURE — 80076 HEPATIC FUNCTION PANEL: CPT | Performed by: PSYCHIATRY & NEUROLOGY

## 2024-07-21 RX ADMIN — OLANZAPINE 15 MG: 10 TABLET, ORALLY DISINTEGRATING ORAL at 08:40

## 2024-07-21 RX ADMIN — NICOTINE 1 PATCH: 21 PATCH, EXTENDED RELEASE TRANSDERMAL at 08:58

## 2024-07-21 RX ADMIN — HALOPERIDOL 5 MG: 5 TABLET ORAL at 18:09

## 2024-07-21 RX ADMIN — DIVALPROEX SODIUM 500 MG: 500 TABLET, FILM COATED, EXTENDED RELEASE ORAL at 18:09

## 2024-07-21 RX ADMIN — GABAPENTIN 400 MG: 400 CAPSULE ORAL at 14:01

## 2024-07-21 RX ADMIN — Medication 50 MCG: at 08:40

## 2024-07-21 RX ADMIN — GABAPENTIN 400 MG: 400 CAPSULE ORAL at 08:41

## 2024-07-21 RX ADMIN — NICOTINE POLACRILEX 2 MG: 2 LOZENGE ORAL at 18:07

## 2024-07-21 RX ADMIN — HALOPERIDOL 5 MG: 5 TABLET ORAL at 08:40

## 2024-07-21 RX ADMIN — OLANZAPINE 15 MG: 10 TABLET, ORALLY DISINTEGRATING ORAL at 18:08

## 2024-07-21 RX ADMIN — DIVALPROEX SODIUM 500 MG: 500 TABLET, FILM COATED, EXTENDED RELEASE ORAL at 08:40

## 2024-07-21 RX ADMIN — GABAPENTIN 400 MG: 400 CAPSULE ORAL at 18:10

## 2024-07-21 RX ADMIN — NICOTINE POLACRILEX 2 MG: 2 LOZENGE ORAL at 18:06

## 2024-07-21 ASSESSMENT — ACTIVITIES OF DAILY LIVING (ADL)
ADLS_ACUITY_SCORE: 41
DRESS: INDEPENDENT
ADLS_ACUITY_SCORE: 41
HYGIENE/GROOMING: HANDWASHING;SHOWER;INDEPENDENT
ADLS_ACUITY_SCORE: 41
ADLS_ACUITY_SCORE: 41
LAUNDRY: UNABLE TO COMPLETE
HYGIENE/GROOMING: INDEPENDENT
ADLS_ACUITY_SCORE: 41
ADLS_ACUITY_SCORE: 41
DRESS: SCRUBS (BEHAVIORAL HEALTH);INDEPENDENT
ADLS_ACUITY_SCORE: 41
ORAL_HYGIENE: INDEPENDENT
ADLS_ACUITY_SCORE: 41
LAUNDRY: UNABLE TO COMPLETE
ADLS_ACUITY_SCORE: 41
ORAL_HYGIENE: INDEPENDENT
ADLS_ACUITY_SCORE: 41

## 2024-07-21 NOTE — PLAN OF CARE
"Goal Outcome Evaluation:    Patient presents with a bright/animated affect. Reports mood as \"I'm wonderful.\" Is pleasant, polite, calm, and cooperative. Thought content presents as preoccupation regarding discharge. Thought process presents as relevant. Speech presents as clear, coherent, and spontaneous. Eye contact is good. Patient denies depression, anxiety, suicidal ideation, SIB, homicidal ideation, and auditory/visual hallucinations. No medical issues noted. Vital signs stable. Patient was medication compliant. Ate his evening meal. Patient out in the milieu occasionally this shift walking the hallway; and watching T.V, and movies. Social with staff.     /81   Pulse 92   Temp 98.3  F (36.8  C) (Temporal)   Resp 18   Ht 1.88 m (6' 2\")   Wt 81.8 kg (180 lb 4.8 oz)   SpO2 92%   BMI 23.15 kg/m                     "

## 2024-07-21 NOTE — PLAN OF CARE
Goal Outcome Evaluation:    Problem: Sleep Disturbance  Goal: Adequate Sleep/Rest  Outcome: Progressing    NOC Shift Report    Pt in bed at beginning of shift, breathing quiet and unlabored. Pt slept through shift. Pt slept 7 hours.     No pt complaints or concerns at this time.     No PRNs given. Will continue to monitor.

## 2024-07-21 NOTE — PLAN OF CARE
Goal Outcome Evaluation:    Plan of Care Reviewed With: patient        Pt spent most of the day in his room resting in bed. Pt reluctantly agreed to have blood draw this morning. Pt was medication compliant. Pt was out of room for breakfast and lunch, and pt came out to the lounge around 1400 and stated that he going to try and stay up for the rest of the day. Pt was otherwise focused on being left alone, want to sleep the day away.  Pt expressed much frustration over length of stay. Denied mental health sx's.  Pt is focused on discharge tomorrow and he states if he's not discharged he is going to go into a deep depression. Pt states he won't go to groups or participate. He will only sleep and eat. Pt stated that he believes if he is not discharged tomorrow, then there is some collusion with the police or something and he will start an investigation.  Pt stated that he is going to buy his aunt a house.  Pt stated he will do this by winning the lottery or going to a casino and winning the money. Pt stated, shortly after this that he is owed 5 million dollars. Pt stated that he can't tell my how he is owed this money because it's confidential.

## 2024-07-22 PROCEDURE — 124N000002 HC R&B MH UMMC

## 2024-07-22 PROCEDURE — 250N000013 HC RX MED GY IP 250 OP 250 PS 637: Performed by: CLINICAL NURSE SPECIALIST

## 2024-07-22 PROCEDURE — 99233 SBSQ HOSP IP/OBS HIGH 50: CPT | Performed by: PSYCHIATRY & NEUROLOGY

## 2024-07-22 PROCEDURE — 250N000011 HC RX IP 250 OP 636: Performed by: PSYCHIATRY & NEUROLOGY

## 2024-07-22 PROCEDURE — 250N000013 HC RX MED GY IP 250 OP 250 PS 637: Performed by: NURSE PRACTITIONER

## 2024-07-22 PROCEDURE — 250N000013 HC RX MED GY IP 250 OP 250 PS 637: Performed by: PSYCHIATRY & NEUROLOGY

## 2024-07-22 RX ORDER — DIVALPROEX SODIUM 500 MG/1
500 TABLET, EXTENDED RELEASE ORAL EVERY MORNING
Status: DISCONTINUED | OUTPATIENT
Start: 2024-07-23 | End: 2024-08-09 | Stop reason: HOSPADM

## 2024-07-22 RX ORDER — HALOPERIDOL DECANOATE 50 MG/ML
50 INJECTION INTRAMUSCULAR
Status: DISCONTINUED | OUTPATIENT
Start: 2024-07-22 | End: 2024-08-09 | Stop reason: HOSPADM

## 2024-07-22 RX ADMIN — OLANZAPINE 15 MG: 10 TABLET, ORALLY DISINTEGRATING ORAL at 18:03

## 2024-07-22 RX ADMIN — OLANZAPINE 15 MG: 10 TABLET, ORALLY DISINTEGRATING ORAL at 08:11

## 2024-07-22 RX ADMIN — NICOTINE POLACRILEX 4 MG: 2 LOZENGE ORAL at 16:50

## 2024-07-22 RX ADMIN — HALOPERIDOL 5 MG: 5 TABLET ORAL at 08:11

## 2024-07-22 RX ADMIN — HALOPERIDOL 5 MG: 5 TABLET ORAL at 18:04

## 2024-07-22 RX ADMIN — GABAPENTIN 400 MG: 400 CAPSULE ORAL at 18:03

## 2024-07-22 RX ADMIN — HALOPERIDOL DECANOATE 50 MG: 50 INJECTION INTRAMUSCULAR at 16:29

## 2024-07-22 RX ADMIN — GABAPENTIN 400 MG: 400 CAPSULE ORAL at 13:34

## 2024-07-22 RX ADMIN — NICOTINE 1 PATCH: 21 PATCH, EXTENDED RELEASE TRANSDERMAL at 08:15

## 2024-07-22 RX ADMIN — Medication 50 MCG: at 08:12

## 2024-07-22 RX ADMIN — DIVALPROEX SODIUM 500 MG: 500 TABLET, FILM COATED, EXTENDED RELEASE ORAL at 08:11

## 2024-07-22 RX ADMIN — GABAPENTIN 400 MG: 400 CAPSULE ORAL at 08:11

## 2024-07-22 ASSESSMENT — ACTIVITIES OF DAILY LIVING (ADL)
ADLS_ACUITY_SCORE: 41
ORAL_HYGIENE: INDEPENDENT
DRESS: INDEPENDENT
HYGIENE/GROOMING: INDEPENDENT
ADLS_ACUITY_SCORE: 41
ADLS_ACUITY_SCORE: 41
DRESS: INDEPENDENT
ADLS_ACUITY_SCORE: 41
ORAL_HYGIENE: INDEPENDENT
LAUNDRY: UNABLE TO COMPLETE
ADLS_ACUITY_SCORE: 41
HYGIENE/GROOMING: INDEPENDENT
ADLS_ACUITY_SCORE: 41
LAUNDRY: UNABLE TO COMPLETE

## 2024-07-22 NOTE — PROGRESS NOTES
"[facility], [place]   Psychiatric Progress Note        Interim History:     Team meeting report: The patient's care was discussed with the treatment team during the daily team meeting and/or staff's chart notes were reviewed.  Staff reports the patient has been mostly calm, pleasant and cooperative and had an, overall, good weekend, though, it was also reported that he was very focused on being discharged from this hospital on Monday and still sounded hypomanic. Said that he would be very disappointed if this didn't happen and in such case he would call the police and start investigation, see RN's note below:    \"Pt spent most of the day in his room resting in bed. Pt reluctantly agreed to have blood draw this morning. Pt was medication compliant. Pt was out of room for breakfast and lunch, and pt came out to the lounge around 1400 and stated that he going to try and stay up for the rest of the day. Pt was otherwise focused on being left alone, want to sleep the day away.  Pt expressed much frustration over length of stay. Denied mental health sx's.  Pt is focused on discharge tomorrow and he states if he's not discharged he is going to go into a deep depression. Pt states he won't go to groups or participate. He will only sleep and eat. Pt stated that he believes if he is not discharged tomorrow, then there is some collusion with the police or something and he will start an investigation.  Pt stated that he is going to buy his aunt a house.  Pt stated he will do this by winning the lottery or going to a casino and winning the money. Pt stated, shortly after this that he is owed 5 million dollars. Pt stated that he can't tell my how he is owed this money because it's confidential.\"        Met with the patient: he was seen at the conference room in presence of PA student. Serafin immediately started talking about discharge. He told us again and again that he would go to his auntie who is like " "mother for him, insisted that auntie would take him to her place and that we needed to call her in front of him immediately and that he could not stay at this hospital for even one day more. He firmly declined suggestion that our plans were to give him an injection of Haldol Decanoate. He insisted that he had been doing great: \"look, I am off SIO, but if you keep me at this hospital, I will need to be on not 1:1, but 3 to 1\"! Serafin was pressured and almost impossible to interrupt. He refused to get out of room on his own. We had to invite of psych associates to come to the conference room and help us. After long discussion, Serafin, eventually, stood up and reluctantly left the conference room.          Medications:     Current Facility-Administered Medications   Medication Dose Route Frequency Provider Last Rate Last Admin    divalproex sodium extended-release (DEPAKOTE ER) 24 hr tablet 500 mg  500 mg Oral BID Kaden Daniels APRN CNP   500 mg at 07/22/24 0811    gabapentin (NEURONTIN) capsule 400 mg  400 mg Oral TID Jese Arizmendi MD   400 mg at 07/22/24 1334    haloperidol (HALDOL) tablet 5 mg  5 mg Oral BID Kaden Daniels APRN CNP   5 mg at 07/22/24 0811    haloperidol decanoate (HALDOL DECANOATE) injection 50 mg  50 mg Intramuscular Q28 Days Jese Arizmendi MD        nicotine (NICODERM CQ) 21 MG/24HR 24 hr patch 1 patch  1 patch Transdermal Daily Jese Arizmendi MD   1 patch at 07/22/24 0815    OLANZapine zydis (zyPREXA) ODT tab 15 mg  15 mg Oral BID Jese Arizmendi MD   15 mg at 07/22/24 0811    Vitamin D3 (CHOLECALCIFEROL) tablet 50 mcg  50 mcg Oral At Bedtime Maria Pennington APRN CNP   50 mcg at 07/22/24 0812            Allergies:     Allergies   Allergen Reactions    Paliperidone Other (See Comments)     Other reaction(s): Extrapyramidal Symptoms   Significant EPS    Significant EPS    Pork Allergy Other (See Comments)     Anabaptism prohibition         "    Labs:     Recent Results (from the past 672 hour(s))   Urine Drug Screen Panel    Collection Time: 06/25/24 11:36 PM   Result Value Ref Range    Amphetamines Urine Screen Negative Screen Negative    Barbituates Urine Screen Negative Screen Negative    Benzodiazepine Urine Screen Negative Screen Negative    Cannabinoids Urine Screen Negative Screen Negative    Cocaine Urine Screen Negative Screen Negative    Fentanyl Qual Urine Screen Negative Screen Negative    Opiates Urine Screen Negative Screen Negative    PCP Urine Screen Negative Screen Negative   Comprehensive metabolic panel    Collection Time: 06/26/24  9:27 PM   Result Value Ref Range    Sodium 136 135 - 145 mmol/L    Potassium 3.9 3.4 - 5.3 mmol/L    Carbon Dioxide (CO2) 29 22 - 29 mmol/L    Anion Gap 7 7 - 15 mmol/L    Urea Nitrogen 12.1 6.0 - 20.0 mg/dL    Creatinine 0.86 0.67 - 1.17 mg/dL    GFR Estimate >90 >60 mL/min/1.73m2    Calcium 9.3 8.6 - 10.0 mg/dL    Chloride 100 98 - 107 mmol/L    Glucose 110 (H) 70 - 99 mg/dL    Alkaline Phosphatase 75 40 - 150 U/L    AST 31 0 - 45 U/L    ALT 37 0 - 70 U/L    Protein Total 6.9 6.4 - 8.3 g/dL    Albumin 4.3 3.5 - 5.2 g/dL    Bilirubin Total 0.4 <=1.2 mg/dL   CK total    Collection Time: 06/26/24  9:27 PM   Result Value Ref Range     39 - 308 U/L   CBC with platelets and differential    Collection Time: 06/26/24  9:27 PM   Result Value Ref Range    WBC Count 7.5 4.0 - 11.0 10e3/uL    RBC Count 4.68 4.40 - 5.90 10e6/uL    Hemoglobin 14.3 13.3 - 17.7 g/dL    Hematocrit 42.4 40.0 - 53.0 %    MCV 91 78 - 100 fL    MCH 30.6 26.5 - 33.0 pg    MCHC 33.7 31.5 - 36.5 g/dL    RDW 13.0 10.0 - 15.0 %    Platelet Count 257 150 - 450 10e3/uL    % Neutrophils 61 %    % Lymphocytes 26 %    % Monocytes 11 %    % Eosinophils 2 %    % Basophils 0 %    % Immature Granulocytes 0 %    NRBCs per 100 WBC 0 <1 /100    Absolute Neutrophils 4.6 1.6 - 8.3 10e3/uL    Absolute Lymphocytes 1.9 0.8 - 5.3 10e3/uL    Absolute  Monocytes 0.8 0.0 - 1.3 10e3/uL    Absolute Eosinophils 0.2 0.0 - 0.7 10e3/uL    Absolute Basophils 0.0 0.0 - 0.2 10e3/uL    Absolute Immature Granulocytes 0.0 <=0.4 10e3/uL    Absolute NRBCs 0.0 10e3/uL   Urine Drug Screen Panel    Collection Time: 06/26/24  9:29 PM   Result Value Ref Range    Amphetamines Urine Screen Negative Screen Negative    Barbituates Urine Screen Negative Screen Negative    Benzodiazepine Urine Screen Negative Screen Negative    Cannabinoids Urine Screen Negative Screen Negative    Cocaine Urine Screen Negative Screen Negative    Fentanyl Qual Urine Screen Negative Screen Negative    Opiates Urine Screen Negative Screen Negative    PCP Urine Screen Negative Screen Negative   Symptomatic COVID-19 Virus (Coronavirus) by PCR Nasopharyngeal    Collection Time: 06/27/24  6:52 AM    Specimen: Nasopharyngeal; Swab   Result Value Ref Range    SARS CoV2 PCR Negative Negative   Lipid Profile    Collection Time: 06/29/24  7:55 AM   Result Value Ref Range    Cholesterol 209 (H) <200 mg/dL    Triglycerides 281 (H) <150 mg/dL    Direct Measure HDL 70 >=40 mg/dL    LDL Cholesterol Calculated 83 <=100 mg/dL    Non HDL Cholesterol 139 (H) <130 mg/dL   Hemoglobin A1c    Collection Time: 06/29/24  7:55 AM   Result Value Ref Range    Hemoglobin A1C 5.4 <5.7 %   Hepatic panel    Collection Time: 07/21/24  8:02 AM   Result Value Ref Range    Protein Total 6.7 6.4 - 8.3 g/dL    Albumin 4.0 3.5 - 5.2 g/dL    Bilirubin Total 0.2 <=1.2 mg/dL    Alkaline Phosphatase 88 40 - 150 U/L    AST 18 0 - 45 U/L    ALT 16 0 - 70 U/L    Bilirubin Direct <0.20 0.00 - 0.30 mg/dL   Valproic acid    Collection Time: 07/21/24  8:02 AM   Result Value Ref Range    Valproic acid 58.8   ug/mL            Precautions:     Behavioral Orders   Procedures    Assault precautions    Code 1 - Restrict to Unit    Elopement precautions    Fall precautions    Routine Programming     As clinically indicated    Status 15     Every 15 minutes.     Suicide precautions: Suicide Risk: HIGH; Clinical rationale to override score: modification to the care environment     Patients on Suicide Precautions should have a Combination Diet ordered that includes a Diet selection(s) AND a Behavioral Tray selection for Safe Tray - with utensils, or Safe Tray - NO utensils       Order Specific Question:   Suicide Risk     Answer:   HIGH     Order Specific Question:   Clinical rationale to override score:     Answer:   modification to the care environment            Psychiatric Examination:   Temp: 97.4  F (36.3  C) Temp src: Temporal BP: 116/87 Pulse: 87     SpO2: 100 % O2 Device: None (Room air)    Weight is 180 lbs 4.8 oz  Body mass index is 23.15 kg/m .    Orthostatic Vitals         Most Recent      Standing Orthostatic /78 07/22 0800    Standing Orthostatic Pulse (bpm) 104 07/22 0800           Appearance: awake, alert and adequately groomed  Attitude: minimally cooperative and guarded  Eye Contact:  tense  Mood:  anxious, agitated  Affect: labile  Speech:  pressured speech, loud  Psychomotor Behavior:  no evidence of tardive dyskinesia, dystonia, or tics  Throught Process: disorganized  Associations:  some loose associations  Thought Content:  no evidence of suicidal ideation or homicidal ideation, no auditory hallucinations present, and no visual hallucinations present, grandiose delusions are present  Insight:  limited  Judgement:  limited  Oriented to:  time, person, and place  Attention Span and Concentration:  poor  Recent and Remote Memory:  fair         Precautions:     Behavioral Orders   Procedures    Assault precautions    Code 1 - Restrict to Unit    Elopement precautions    Fall precautions    Routine Programming     As clinically indicated    Status 15     Every 15 minutes.    Suicide precautions: Suicide Risk: HIGH; Clinical rationale to override score: modification to the care environment     Patients on Suicide Precautions should have a Combination  Diet ordered that includes a Diet selection(s) AND a Behavioral Tray selection for Safe Tray - with utensils, or Safe Tray - NO utensils       Order Specific Question:   Suicide Risk     Answer:   HIGH     Order Specific Question:   Clinical rationale to override score:     Answer:   modification to the care environment          DIagnoses:   Schizoaffective disorder, bipolar type, current episode manic, severe, with psychosis  Stimulant use disorder, per his disorder, per history  Noncompliance noncompliance with treatment       Plan:   Medications:    -- Increase Depakote to 500 qam and 750 mg qhs for mood stabilization on 7/22.  -- Continue gabapentin, 400 mg 3 times a day for anxiety and agitation  -- Nicotine patch, 21 mg every morning  -- Zyprexa, 10 mg twice a day for agitation and psychosis  -- Start Haldol Decanoate 50 mg, bid for agitation, candie and psychosis on 7/22/2024.  -- Vitamin D, 50 mcg at bedtime  -- Additional medications include B-52, Latuda, Zyprexa, trazodone    Medical:  --Internal medicine to follow up for medical problems     --Lab work was reviewed 7/22/2024. Valproic Acid was 58.8.     --Cheeking precautions  --Care was coordinated with the treatment team.   --The patient was consulted on nature of illness and treatment options.     Per CTC, patient is committed and [facility]. I changed his legal status on 7/22/2024.     Total time spent was 51 minutes. Over 50% of times was spent counseling and coordination of care regarding coping skills, medication and discharge planning.

## 2024-07-22 NOTE — PLAN OF CARE
Team Note Due:  Friday    Assessment/Intervention/Current Symtoms and Care Coordination:  Chart review and met with team, discussed pt progress, symptomology, and response to treatment.  Discussed the discharge plan and any potential impediments to discharge.    We obtained committment order and [facility] order.   Also waiting for mental health case management assignment to start developng a plan.    RN and I conferred that the pt sleeps most of the day.  I did approach him around 3 and he was in bed.  I explained that the commitment and medication order had come in and I gave him a copy.  I asked him why he was sleeping so much and he said to make the time pass.  I also explained that we are recommending that he go to [facility].   He said no, that it was enough to stay here.  PT was very oppositional throughput our conversation saying he was going to his auntie's house and no one was going to force him to do anything and they were not going to put anything in his body.  Pt made grandiose statement like he is going to buy his aunt a house and he was the [facility].      Discharge Plan or Goal:  [facility]  OR  [facility] treatment         Barriers to Discharge:  Pt is ill and does not have providers  Pt is resistant to recommendations     Referral Status:  Too early  Pt has no established providers       Medical [place]  [place] is now [place].  Insurance Company Name [place] (Elig Type [place]: [place] Early Expansion)  PMI # [id number]  [facility]      [facility] Status:  [facility]  File Number: [id number]  Start and expiration date of commitment:   July 19, 2024 to January 17, 2025      Court  asked for DA. DA was sent 7/10/24. Waiting for case management assignment.  Neha Humphries  Senior   [facility]al Health Assessment/Intake  Office: [phone] Fax: [phone]  [email or web address], [email or web address] [place] [address],  [place] [address]      Contacts:      HUSSEIN Kohler  [facility] Attorney: Victoria Christianson @ [phone]    CLIFTON for aunt.Aunt:Nita @ [phone])  Father:Subhash @ [phone]        Upcoming Meetings and Dates/Important Information and next steps:    Will refer to [facility] when less symptomatic.    Will need psychiatry appointment.    Pt will need provisional discharge at the time of discharge.

## 2024-07-22 NOTE — PLAN OF CARE
BEH IP Unit Acuity Rating Score (UARS)  Patient is given one point for every criteria they meet.     CRITERIA SCORING   On a 72 hour hold, court hold, committed, stay of commitment, or revocation. 1    Patient LOS on BEH unit exceeds 20 days. 1  LOS: 25    Patient under guardianship, 55+, otherwise medically complex, or under age 11. 0   Suicide ideation without relief of precipitating factors. 0   Current plan for suicide. 0   Current plan for homicide. 0   Imminent risk or actual attempt to seriously harm another without relief of factors precipitating the attempt. 0   Severe dysfunction in daily living (ex: complete neglect for self care, extreme disruption in vegetative function, extreme deterioration in social interactions). 1   Recent (last 7 days) or current physical aggression in the ED or on unit. 0   Restraints or seclusion episode in past 72 hours. 0   Recent (last 7 days) or current verbal aggression, agitation, yelling, etc., while in the ED or unit. 0      Active psychosis. 1   Need for constant or near constant redirection (from leaving, from others, etc).  1   Intrusive or disruptive behaviors. 1   Patient requires 3 or more hours of individualized nursing care per 8-hour shift (i.e. for ADLs, meds, therapeutic interventions). 1   TOTAL 7

## 2024-07-22 NOTE — PLAN OF CARE
Problem: Psychotic Signs/Symptoms  Goal: Improved Psychomotor Symptoms (Psychotic Signs/Symptoms)  Intervention: Manage Psychomotor Movement  Recent Flowsheet Documentation  Taken 7/22/2024 0900 by Ange Harris, RN  Activity (Behavioral Health): up ad mustapha   Goal Outcome Evaluation:    Plan of Care Reviewed With: patient      Patient spent  most of the shift in his room sleeping on and off he only came out for breakfast and lunch.  He is now committed with [facility]. Affect bright on approach. Denied SI/SIB and all other mental health symptoms. Patient was calmed and cooperative. Ate well for breakfast and lunch. Took all scheduled medications with zero problem. Denied pain or any discomfort, no concern with behaviors.

## 2024-07-22 NOTE — PLAN OF CARE
Problem: Psychotic Signs/Symptoms  Goal: Improved Behavioral Control (Psychotic Signs/Symptoms)  Outcome: Progressing     Problem: Suicide Risk  Goal: Absence of Self-Harm  Outcome: Progressing     Problem: Manic or Hypomanic Signs/Symptoms  Goal: Improved Impulse Control (Manic/Hypomanic Signs/Symptoms)  Outcome: Progressing    Patient has spent the majority of the shift in his room. He has been avoiding social contact. Affect is flat and blunted. Mood has been labile. Patient was irritable, paranoid and suspicious when informed that haldol decanoate injection was scheduled for this afternoon. Patient refused and became verbally agitated, but cooperated with taking medication with show of support from [facility] team. No further behavioral concerns this shift. Patient was out at dinner time, and requested medications early before going back to room for the evening. He took all medications except depakote which was not available at that time. Patient had already been sleeping when medication arrived, and declined when awakened. Patient denies anxiety and depression. He denies SI/SIB/HI and hallucinations. No reports of pain, discomfort or medication side effects.

## 2024-07-23 PROCEDURE — 250N000013 HC RX MED GY IP 250 OP 250 PS 637: Performed by: CLINICAL NURSE SPECIALIST

## 2024-07-23 PROCEDURE — 99232 SBSQ HOSP IP/OBS MODERATE 35: CPT | Performed by: PSYCHIATRY & NEUROLOGY

## 2024-07-23 PROCEDURE — 250N000013 HC RX MED GY IP 250 OP 250 PS 637: Performed by: NURSE PRACTITIONER

## 2024-07-23 PROCEDURE — 250N000013 HC RX MED GY IP 250 OP 250 PS 637: Performed by: PSYCHIATRY & NEUROLOGY

## 2024-07-23 PROCEDURE — 124N000002 HC R&B MH UMMC

## 2024-07-23 RX ADMIN — DIVALPROEX SODIUM 500 MG: 500 TABLET, FILM COATED, EXTENDED RELEASE ORAL at 08:10

## 2024-07-23 RX ADMIN — GABAPENTIN 400 MG: 400 CAPSULE ORAL at 08:11

## 2024-07-23 RX ADMIN — HALOPERIDOL 5 MG: 5 TABLET ORAL at 18:12

## 2024-07-23 RX ADMIN — NICOTINE 1 PATCH: 21 PATCH, EXTENDED RELEASE TRANSDERMAL at 08:11

## 2024-07-23 RX ADMIN — OLANZAPINE 15 MG: 10 TABLET, ORALLY DISINTEGRATING ORAL at 18:10

## 2024-07-23 RX ADMIN — NICOTINE POLACRILEX 4 MG: 2 LOZENGE ORAL at 16:15

## 2024-07-23 RX ADMIN — NICOTINE POLACRILEX 2 MG: 2 LOZENGE ORAL at 18:12

## 2024-07-23 RX ADMIN — DIVALPROEX SODIUM 750 MG: 500 TABLET, FILM COATED, EXTENDED RELEASE ORAL at 18:11

## 2024-07-23 RX ADMIN — Medication 50 MCG: at 08:10

## 2024-07-23 RX ADMIN — HALOPERIDOL 5 MG: 5 TABLET ORAL at 08:10

## 2024-07-23 RX ADMIN — GABAPENTIN 400 MG: 400 CAPSULE ORAL at 13:25

## 2024-07-23 RX ADMIN — OLANZAPINE 15 MG: 10 TABLET, ORALLY DISINTEGRATING ORAL at 08:10

## 2024-07-23 RX ADMIN — GABAPENTIN 400 MG: 400 CAPSULE ORAL at 18:10

## 2024-07-23 ASSESSMENT — ACTIVITIES OF DAILY LIVING (ADL)
ADLS_ACUITY_SCORE: 40
ADLS_ACUITY_SCORE: 41
LAUNDRY: UNABLE TO COMPLETE
ADLS_ACUITY_SCORE: 41
ADLS_ACUITY_SCORE: 41
ORAL_HYGIENE: INDEPENDENT
ADLS_ACUITY_SCORE: 40
ADLS_ACUITY_SCORE: 40
ADLS_ACUITY_SCORE: 41
ADLS_ACUITY_SCORE: 40
ADLS_ACUITY_SCORE: 40
ADLS_ACUITY_SCORE: 41
ADLS_ACUITY_SCORE: 40
ADLS_ACUITY_SCORE: 41
DRESS: INDEPENDENT;SCRUBS (BEHAVIORAL HEALTH)
DRESS: INDEPENDENT;SCRUBS (BEHAVIORAL HEALTH)
ADLS_ACUITY_SCORE: 41
ADLS_ACUITY_SCORE: 40
ADLS_ACUITY_SCORE: 40
HYGIENE/GROOMING: INDEPENDENT
ORAL_HYGIENE: INDEPENDENT
ADLS_ACUITY_SCORE: 40
HYGIENE/GROOMING: INDEPENDENT
LAUNDRY: UNABLE TO COMPLETE
ADLS_ACUITY_SCORE: 41

## 2024-07-23 NOTE — PROGRESS NOTES
"[facility], [place]   Psychiatric Progress Note        Interim History:   The patient's care was discussed with the treatment team during the daily team meeting and/or staff's chart notes were reviewed.  Staff report patient has been labile and irritable. Staff report patient frequently talked about going to his Aunt's House and was making other grandious comments such as being the new [facility]. When it came time for the patient to get his injection last evening staff report patient was irritable, paranoid and suspicious. He refused the injection and became verbally agitated. A code green was called and the [facility]O team presented. Patient was then cooperative for his haldol decanoate injection. Patient took all of his other medications besides Depakote. Staff report patient has been sleeping a lot as a way to pass the time. He got 6.5 hours of sleep overnight and did not request any PRN medications. Staff report patient is not interested in meeting with us today and wishes to only meet with us every other day from now on.     Met with patient: he initially refused to talk to us, but, eventually, agreed. Patient was seen in his room with with RN present. Patient reports sleeping well and that his mood is \"excellent.\" He states his medications are working fine and he does not feel like he has any side effects. He wonders about who is making the ultimate decisions in his care, if it is the court or us, and he was advised we are making decisions in his best interest at the advice of the court. He is glad to hear we are \"on his side.\" He requests his clothes which we will order after they have been searched by a staff member for safety. Patient requests to only speak with us every other day. He was educated on the importance of meeting with us every day, even if it is just for a short period of time. Patient did not have any more questions or concerns.          Medications:     Current " "Facility-Administered Medications   Medication Dose Route Frequency Provider Last Rate Last Admin    divalproex sodium extended-release (DEPAKOTE ER) 24 hr tablet 500 mg  500 mg Oral QAM Jese Arizmendi MD   500 mg at 07/23/24 0810    divalproex sodium extended-release (DEPAKOTE ER) 24 hr tablet 750 mg  750 mg Oral At Bedtime Jese Arizmendi MD        gabapentin (NEURONTIN) capsule 400 mg  400 mg Oral TID Jese Arizmendi MD   400 mg at 07/23/24 1325    haloperidol (HALDOL) tablet 5 mg  5 mg Oral BID Kaden Daniels APRN CNP   5 mg at 07/23/24 0810    haloperidol decanoate (HALDOL DECANOATE) injection 50 mg  50 mg Intramuscular Q28 Days Jese Arizmendi MD   50 mg at 07/22/24 1629    nicotine (NICODERM CQ) 21 MG/24HR 24 hr patch 1 patch  1 patch Transdermal Daily Jese Arizmendi MD   1 patch at 07/23/24 0811    OLANZapine zydis (zyPREXA) ODT tab 15 mg  15 mg Oral BID Jese Arizmendi MD   15 mg at 07/23/24 0810    Vitamin D3 (CHOLECALCIFEROL) tablet 50 mcg  50 mcg Oral At Bedtime Maria Pennington APRN CNP   50 mcg at 07/23/24 0810          Allergies:     Allergies   Allergen Reactions    Paliperidone Other (See Comments)     Other reaction(s): Extrapyramidal Symptoms   Significant EPS    Significant EPS    Pork Allergy Other (See Comments)     Islam prohibition          Labs:   No results found for this or any previous visit (from the past 24 hour(s)).       Psychiatric Examination:     /87 (BP Location: Right arm)   Pulse 87   Temp 97.4  F (36.3  C) (Temporal)   Resp 18   Ht 1.88 m (6' 2\")   Wt 81.8 kg (180 lb 4.8 oz)   SpO2 100%   BMI 23.15 kg/m    Weight is 180 lbs 4.8 oz  Body mass index is 23.15 kg/m .    Orthostatic Vitals         Most Recent      Standing Orthostatic /78 07/22 0800    Standing Orthostatic Pulse (bpm) 104 07/22 0800          Appearance: awake, alert, adequately groomed, and dressed in hospital scrubs  Attitude:  " somewhat cooperative  Eye Contact:  fair  Mood:  anxious and better  Affect:  labile  Speech:  clear, coherent  Psychomotor Behavior:  no evidence of tardive dyskinesia, dystonia, or tics  Throught Process:  linear  Associations:  no loose associations  Thought Content:  no evidence of suicidal ideation or homicidal ideation, no evidence of psychotic thought, no auditory hallucinations present, and no visual hallucinations present; grandiose delusions still present;  Insight:  limited  Judgement:  limited  Oriented to:  time, person, and place  Attention Span and Concentration:  limited  Recent and Remote Memory:  fair    Clinical Global Impressions  First: 6/4 7/23/2024      Most recent:            Precautions:     Behavioral Orders   Procedures    Assault precautions    Code 1 - Restrict to Unit    Elopement precautions    Fall precautions    Routine Programming     As clinically indicated    Status 15     Every 15 minutes.    Suicide precautions: Suicide Risk: HIGH; Clinical rationale to override score: modification to the care environment     Patients on Suicide Precautions should have a Combination Diet ordered that includes a Diet selection(s) AND a Behavioral Tray selection for Safe Tray - with utensils, or Safe Tray - NO utensils       Order Specific Question:   Suicide Risk     Answer:   HIGH     Order Specific Question:   Clinical rationale to override score:     Answer:   modification to the care environment          DIagnoses:     Schizoaffective disorder, bipolar type, current episode manic, severe, with psychosis  Stimulant use disorder, per his disorder, per history  Noncompliance noncompliance with treatment         Plan:   - Patient still appearing manic in presentation. Will continue to monitor effects of medications and adjust as needed. He got his first shot of Haldol Decanoate yesterday 7/22. No changes to medications 07/23/24  - Will place order for patient to have his clothing  - Continue to  provide support and structure     I was present with PA student who participated in the service and in the documentation of the note. I have verified the history and personally performed the physical exam and medical decision making. I agree with the assessment and plan of care as documented in the note.     Jese Arizmendi MD  [facility]    Total time spent was 35 minutes. Over 50% of times was spent counseling and coordination of care regarding coping skills, medication and discharge planning.

## 2024-07-23 NOTE — PLAN OF CARE
"  Problem: Adult Inpatient Plan of Care  Goal: Optimal Comfort and Wellbeing  Outcome: Progressing     Problem: Anxiety  Goal: Anxiety Reduction or Resolution  Outcome: Progressing     Pt denies anxiety and depression. He denies SI/SIB/HI/AH/VH. Pt contracts for safety. Pt denies pain. VSS. Pt reports he slept \"wonderful\" last night. Pt reports he is doing \"amazing\" today.    Pt appears to be unkempt; however, he declines to shower at this time. Pt observed to be intermittently mistrustful, suspicious, and argumentative. Pt observed to be calm and cooperative majority of the shift; however, pt observed to be intermittently irritable, agitated, and frustrated. Pt presents with a labile affect. Pt's concentration observed to be impaired. Eye contact observed to be appropriate. Speech observed to be clear and coherent as well as intermittently loud. Pt continues to make paranoid and gradiose statements. Pt stated that he was \"tricked\" into coming to the hospital. He continues to report he has \"classified\" information. He also stated he wanted to hack or break into \"the agency.\" Pt stated he has millions of dollars. Thought process observed to be disorganized and flight of ideas. Thought content observed to be denial, suspiciousness, and perseveration.     Pt visible on unit attending meals and briefly watching television; however, pt spent majority of the day resting in bed. Pt reports sleeping helps \"pass the time.\" Pt intermittently angry and irritable, which pt reports to being hospitalized still. Pt reports he wants to leave and voiced being \"mad\" at the doctor. Writer spent time 1:1 with pt discussing the importance of meeting with the provider daily, or at least every other day. Writer also acknowledged pt's frustrations and feelings. Writer also discussed coping skills for pt. Pt redirectable and agreed to meet with provider and student with writer present - meeting went well. Pt thanked writer and agreed to see " "provider every other day, preferably in the morning, and with another staff member present - provider informed of this and sticky note left to treatment team regarding this. Per provider, pt okay to have his clothes per unit policy. Psych associate checked pt's clothing and asked pt if he wanted to remove strings; however, pt asked to talk tomorrow about clothes because he wanted to sleep.    Pt is medication compliant. He denies any medication SE at this time. He continues to have a no roommate order. He remains on assault, elopement, suicide, and fall precautions - no associated behaviors or falls noted this shift. Will continue to monitor and assist as needed.    /73 (BP Location: Right arm, Patient Position: Sitting)   Pulse 99   Temp 97.9  F (36.6  C) (Temporal)   Resp 17   Ht 1.88 m (6' 2\")   Wt 81.8 kg (180 lb 4.8 oz)   SpO2 99%   BMI 23.15 kg/m      "

## 2024-07-23 NOTE — PLAN OF CARE
Team Note Due:  Friday    Assessment/Intervention/Current Symtoms and Care Coordination:  Chart review and met with team, discussed pt progress, symptomology, and response to treatment.  Discussed the discharge plan and any potential impediments to discharge.    I ma informed that pt may be mor accepting of not going to his aunt's house.              Discharge Plan or Goal:  IRTS  OR  HARVEY treatment         Barriers to Discharge:  Pt is ill and does not have providers  Pt is resistant to recommendations     Referral Status:  Too early  Pt has no established providers       Medical Assistance  MA is now active.  Insurance Company Name [place] (Elig Type AX: [place] Early Expansion)  PMI [place] [id number]  [facility] [place]      [place]:  [facility] commitment with [facility]: [facility]  File Number: [id number]  Start and expiration date of commitment:   July 19, 2024 to January 17, 2025      Court  asked for DA. DA was sent 7/10/24. Waiting for case management assignment.  Neha Humphries  Senior   [facility] Behavioral Health Assessment/Intake  Office: [phone] Fax: [phone]  [email or web address], [address] [address]      Contacts:      HUSSEIN Andersen [facility] Attorney: Victoria Christianson @ [phone]    CLIFTON for aunt.Aunt:Nita @ [phone])  Father:Subhash @ [phone]        Upcoming Meetings and Dates/Important Information and next steps:    Will refer to IRTS when less symptomatic.    Will need psychiatry appointment.    Pt will need provisional discharge at the time of discharge.

## 2024-07-24 PROCEDURE — 250N000013 HC RX MED GY IP 250 OP 250 PS 637: Performed by: CLINICAL NURSE SPECIALIST

## 2024-07-24 PROCEDURE — 99232 SBSQ HOSP IP/OBS MODERATE 35: CPT | Performed by: PSYCHIATRY & NEUROLOGY

## 2024-07-24 PROCEDURE — 124N000002 HC R&B MH UMMC

## 2024-07-24 PROCEDURE — 250N000013 HC RX MED GY IP 250 OP 250 PS 637: Performed by: NURSE PRACTITIONER

## 2024-07-24 PROCEDURE — 250N000013 HC RX MED GY IP 250 OP 250 PS 637: Performed by: PSYCHIATRY & NEUROLOGY

## 2024-07-24 RX ADMIN — DIVALPROEX SODIUM 500 MG: 500 TABLET, FILM COATED, EXTENDED RELEASE ORAL at 08:33

## 2024-07-24 RX ADMIN — GABAPENTIN 400 MG: 400 CAPSULE ORAL at 08:33

## 2024-07-24 RX ADMIN — GABAPENTIN 400 MG: 400 CAPSULE ORAL at 18:06

## 2024-07-24 RX ADMIN — OLANZAPINE 15 MG: 10 TABLET, ORALLY DISINTEGRATING ORAL at 18:12

## 2024-07-24 RX ADMIN — NICOTINE POLACRILEX 4 MG: 2 LOZENGE ORAL at 16:41

## 2024-07-24 RX ADMIN — NICOTINE POLACRILEX 2 MG: 2 LOZENGE ORAL at 19:28

## 2024-07-24 RX ADMIN — OLANZAPINE 15 MG: 10 TABLET, ORALLY DISINTEGRATING ORAL at 08:33

## 2024-07-24 RX ADMIN — Medication 50 MCG: at 08:34

## 2024-07-24 RX ADMIN — NICOTINE 1 PATCH: 21 PATCH, EXTENDED RELEASE TRANSDERMAL at 08:34

## 2024-07-24 RX ADMIN — DIVALPROEX SODIUM 750 MG: 500 TABLET, FILM COATED, EXTENDED RELEASE ORAL at 21:38

## 2024-07-24 RX ADMIN — GABAPENTIN 400 MG: 400 CAPSULE ORAL at 13:12

## 2024-07-24 RX ADMIN — HALOPERIDOL 5 MG: 5 TABLET ORAL at 08:34

## 2024-07-24 RX ADMIN — HALOPERIDOL 5 MG: 5 TABLET ORAL at 18:16

## 2024-07-24 ASSESSMENT — ACTIVITIES OF DAILY LIVING (ADL)
ADLS_ACUITY_SCORE: 50
ADLS_ACUITY_SCORE: 50
ADLS_ACUITY_SCORE: 40
HYGIENE/GROOMING: INDEPENDENT;PROMPTS
ADLS_ACUITY_SCORE: 50
ADLS_ACUITY_SCORE: 40
ADLS_ACUITY_SCORE: 50
DRESS: INDEPENDENT
ADLS_ACUITY_SCORE: 50
ADLS_ACUITY_SCORE: 50
ADLS_ACUITY_SCORE: 40
ADLS_ACUITY_SCORE: 50
LAUNDRY: UNABLE TO COMPLETE
ORAL_HYGIENE: INDEPENDENT
ADLS_ACUITY_SCORE: 40
ADLS_ACUITY_SCORE: 40
ADLS_ACUITY_SCORE: 50
ADLS_ACUITY_SCORE: 40
ADLS_ACUITY_SCORE: 40
DRESS: INDEPENDENT;SCRUBS (BEHAVIORAL HEALTH)
ADLS_ACUITY_SCORE: 50
ORAL_HYGIENE: INDEPENDENT
ADLS_ACUITY_SCORE: 40
ADLS_ACUITY_SCORE: 50
LAUNDRY: UNABLE TO COMPLETE
HYGIENE/GROOMING: INDEPENDENT;PROMPTS
ADLS_ACUITY_SCORE: 50
ADLS_ACUITY_SCORE: 40
ADLS_ACUITY_SCORE: 40

## 2024-07-24 NOTE — PLAN OF CARE
Problem: Adult Behavioral Health Plan of Care  Goal: Adheres to Safety Considerations for Self and Others  Outcome: Progressing  Flowsheets (Taken 7/24/2024 0513)  Adheres to Safety Considerations for Self and Others: making progress toward outcome     Problem: Anxiety  Goal: Anxiety Reduction or Resolution  Outcome: Progressing     Pt denies anxiety and depression. Pt denies SI/SIB/HI/AH/VH. Pt contracts for safety. Pt reports 2/10 right knee pain - scheduled medication given, pt declines further pharmacological and nonpharmacological interventions at this time. Orthostatic /79 sitting and 98/73 standing. Orthostatic pulse 111 sitting and 124 standing. Pt asymptomatic at this time. Provider notified. Orthostatic BP recheck 118/77 sitting and 135/83 standing. Pulse recheck 89 sitting and 111 standing. Pt remains asymptomatic at this time. Writer encouraged pt to ensure he is drinking fluids throughout the day. Pt acknowledged an understanding of this. Pt reports he slept well last night.    Pt observed to be unkempt; however, he declines to shower at this time. Eye contact observed to be appropriate. Pt observed to be calm and cooperative. Pt observed to be suspicious and mistrustful at times. Pt presents with a flat, blunted affect. Pt appears sad at times, which he reports is due to still being in the hospital. Speech observed to be clear and coherent but loud on occasion. Thought process observed to be disorganized and flight of ideas; however, less organized compared to previous day when writer cared for pt. Pt observed to be isolative and withdrawn.     Pt visible on unit attending meals and watching television on occasion; however, pt spent majority of the day resting in bed. Writer asked if she could help clean his room up and change his linen; however, pt politely declined and said he would do it himself later. Pt has not done so as of currently; however, writer did inform oncoming shift to continue  "to encourage this this evening. Pt received a letter from his  and agreed to open it in front of writer per policy. Pt asked writer to read it to him, to which writer did. Pt stated, \"I am not going to appeal\" and \"I don't want to be here longer\" and reported he is going to follow what he is supposed do for the next six months.     Pt is medication compliant. He denies any medication SE at this time. He did not request or receive any PRN medication this shift. He continues to have a no roommate order. He remains on assault, elopement, suicide, and fall precautions. Will continue to monitor and assist as needed.    /77 (Patient Position: Sitting)   Pulse 89   Temp 97.5  F (36.4  C) (Temporal)   Resp 17   Ht 1.88 m (6' 2\")   Wt 81.8 kg (180 lb 4.8 oz)   SpO2 97%   BMI 23.15 kg/m      "

## 2024-07-24 NOTE — PLAN OF CARE
Pt appeared to sleep for a total of 6.25 hours overnight. No PRN medications were administered and no concerns were noted.     Problem: Sleep Disturbance  Goal: Adequate Sleep/Rest  Outcome: Progressing

## 2024-07-24 NOTE — PLAN OF CARE
Team Note Due:  Friday    Assessment/Intervention/Current Symtoms and Care Coordination:  Chart review and met with team, discussed pt progress, symptomology, and response to treatment.  Discussed the discharge plan and any potential impediments to discharge.    Pt has not been visible to me on the unit today.    Discharge Plan or Goal:  IRTS  OR  HARVEY treatment     Barriers to Discharge:  Pt is ill and does not have providers  Pt is resistant to recommendations     Referral Status:  Too early  Pt has no established providers       Medical Assistance  MA is now active.  Insurance Company Name [place] (Elig Type AX: [place] Early Expansion)  P[place] # [id number]  [facility] [place]      [place] Status:  [facility] commitment with [facility]: [facility]  File Number: [id number]  Start and expiration date of commitment:   July 19, 2024 to January 17, 2025      Court  asked for DA. DA was sent 7/10/24. Waiting for case management assignment.  Neha Humphries  Senior   [facility] Behavioral Health Assessment/Intake  Office: [phone] Fax: [phone]  [email or web address], [address] [place] [address]      Contacts:      HUSSEIN Andersen [facility] Attorney: Victoria Christianson @ [phone]    CLIFTON for aunt.Aunt:Nita @ [phone])  Father:Subhash @ [phone]        Upcoming Meetings and Dates/Important Information and next steps:    Will refer to IRTS when less symptomatic.    Will need psychiatry appointment.    Waiting for case management assignment by court .    Pt will need provisional discharge at the time of discharge.

## 2024-07-24 NOTE — PLAN OF CARE
"Problem: Adult Behavioral Health Plan of Care  Goal: Optimized Coping Skills in Response to Life Stressors  Outcome: Progressing     Problem: Psychotic Signs/Symptoms  Goal: Improved Behavioral Control (Psychotic Signs/Symptoms)  Outcome: Progressing    Patient has been observed in the milieu walking and listening to headphones. Appears somewhat restless at times, but overall less anxious and agitated. He has been calm and pleasant on approach. Reports feeling a little sad that he is still here, but otherwise denies depression. Patient denies anxiety, SI/SIB/HI and hallucinations. No paranoid or grandiose statements noted this evening. Can appear preoccupied at times. Patient has mostly kept to himself while listening to the radio. Did not attend group. No PRN requests other than nicotine lozenges. He is medication compliant. Requested all medications early and went to bed. No reports of medication side effects, pain or other medical concerns. Patient is out at meal and snack times. Appetite is good.     /75 (BP Location: Left arm)   Pulse 98   Temp 98.3  F (36.8  C) (Oral)   Resp 17   Ht 1.88 m (6' 2\")   Wt 81.8 kg (180 lb 4.8 oz)   SpO2 100%   BMI 23.15 kg/m      "

## 2024-07-24 NOTE — PROGRESS NOTES
"[facility], [place]   Psychiatric Progress Note        Interim History:     The patient's care was discussed with the treatment team during the daily team meeting and/or staff's chart notes were reviewed.  Staff report patient slept for 6.25 hours last night. He was compliant with meds and care. Was reported to spend a lot of time in his room/bed. When asked about that, said that it helps to pass time. Denied all MI symptoms, recently not being reported making bizarre, grandiose statements.    Met with patient: he was in his room/bed. Was clearly uninterested in talking to myself and PA student. When talked, did that in somewhat sarcastic, exaggerated way: \"my day today is the best, I am great. I don't have any problems\". He, then, started talking about need to be discharged home so he could start earning money again: \"I have so much energy, I could do 6 jobs\". We pointed out that working 6 jobs could be exhausting, asked him if he could settle on a smaller number. 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 After saying that he walked out of his room.          Medications:     Current Facility-Administered Medications   Medication Dose Route Frequency Provider Last Rate Last Admin    divalproex sodium extended-release (DEPAKOTE ER) 24 hr tablet 500 mg  500 mg Oral QAM Jese Arizmendi MD   500 mg at 07/24/24 0833    divalproex sodium extended-release (DEPAKOTE ER) 24 hr tablet 750 mg  750 mg Oral At Bedtime Jese Arizmendi MD   750 mg at 07/23/24 1811    gabapentin (NEURONTIN) capsule 400 mg  400 mg Oral TID Jese Arizmendi MD   400 mg at 07/24/24 1312    haloperidol (HALDOL) tablet 5 mg  5 mg Oral BID Kaden Daniels APRN CNP   5 mg at 07/24/24 0834    haloperidol decanoate (HALDOL DECANOATE) injection 50 mg  50 mg Intramuscular Q28 Days Jese Arizmendi MD   50 mg at 07/22/24 " "1629    nicotine (NICODERM CQ) 21 MG/24HR 24 hr patch 1 patch  1 patch Transdermal Daily Jese Arizmendi MD   1 patch at 07/24/24 0834    OLANZapine zydis (zyPREXA) ODT tab 15 mg  15 mg Oral BID Jese Arizmendi MD   15 mg at 07/24/24 0833    Vitamin D3 (CHOLECALCIFEROL) tablet 50 mcg  50 mcg Oral At Bedtime Maria Pennington APRN CNP   50 mcg at 07/24/24 0834          Allergies:     Allergies   Allergen Reactions    Paliperidone Other (See Comments)     Other reaction(s): Extrapyramidal Symptoms   Significant EPS    Significant EPS    Pork Allergy Other (See Comments)     Restoration prohibition          Labs:   No results found for this or any previous visit (from the past 24 hour(s)).       Psychiatric Examination:     /79 (Patient Position: Sitting)   Pulse 111   Temp 97.5  F (36.4  C) (Temporal)   Resp 17   Ht 1.88 m (6' 2\")   Wt 81.8 kg (180 lb 4.8 oz)   SpO2 97%   BMI 23.15 kg/m    Weight is 180 lbs 4.8 oz  Body mass index is 23.15 kg/m .    Orthostatic Vitals         Most Recent      Sitting Orthostatic /77 07/24 1308    Sitting Orthostatic Pulse (bpm) 89 07/24 1308    Standing Orthostatic /83 07/24 1308    Standing Orthostatic Pulse (bpm) 111 07/24 1308           Appearance: awake, alert, adequately groomed, and dressed in hospital scrubs  Attitude:  somewhat cooperative  Eye Contact:  fair  Mood:  anxious and better  Affect:  labile  Speech:  clear, coherent, not pressured  Psychomotor Behavior:  no evidence of tardive dyskinesia, dystonia, or tics  Throught Process:  linear  Associations:  no loose associations  Thought Content:  no evidence of suicidal ideation or homicidal ideation, no evidence of psychotic thought, no auditory hallucinations present, and no visual hallucinations present; grandiose delusions still present;  Insight:  limited  Judgement:  limited  Oriented to:  time, person, and place  Attention Span and Concentration:  limited  Recent and Remote " Memory:  fair    Clinical Global Impressions  First: 6/4 7/23/2024      Most recent:            Precautions:     Behavioral Orders   Procedures    Assault precautions    Code 1 - Restrict to Unit    Elopement precautions    Fall precautions    Routine Programming     As clinically indicated    Status 15     Every 15 minutes.    Suicide precautions: Suicide Risk: HIGH; Clinical rationale to override score: modification to the care environment     Patients on Suicide Precautions should have a Combination Diet ordered that includes a Diet selection(s) AND a Behavioral Tray selection for Safe Tray - with utensils, or Safe Tray - NO utensils       Order Specific Question:   Suicide Risk     Answer:   HIGH     Order Specific Question:   Clinical rationale to override score:     Answer:   modification to the care environment          DIagnoses:     Schizoaffective disorder, bipolar type, current episode manic, severe, with psychosis  Stimulant use disorder, per his disorder, per history  Noncompliance noncompliance with treatment         Plan:   - Patient still appearing manic in presentation. Will continue to monitor effects of medications and adjust as needed. He got his first shot of Haldol Decanoate on 7/22. No changes to medications 07/24/24. Will recheck his Valproic Acid level in few days (58.8 on 7/21).  - Will place order for patient to have his clothing  - Continue to provide support and structure     I was present with PA student who participated in the service and in the documentation of the note. I have verified the history and personally performed the physical exam and medical decision making. I agree with the assessment and plan of care as documented in the note.     Jese Arizmendi MD  [facility] Psychiatry    Total time spent was 35 minutes. Over 50% of times was spent counseling and coordination of care regarding coping skills, medication and discharge planning.

## 2024-07-24 NOTE — PLAN OF CARE
BEH IP Unit Acuity Rating Score (UARS)  Patient is given one point for every criteria they meet.     CRITERIA SCORING   On a 72 hour hold, court hold, committed, stay of commitment, or revocation. 1    Patient LOS on BEH unit exceeds 20 days. 1  LOS: 27    Patient under guardianship, 55+, otherwise medically complex, or under age 11. 0   Suicide ideation without relief of precipitating factors. 0   Current plan for suicide. 0   Current plan for homicide. 0   Imminent risk or actual attempt to seriously harm another without relief of factors precipitating the attempt. 0   Severe dysfunction in daily living (ex: complete neglect for self care, extreme disruption in vegetative function, extreme deterioration in social interactions). 1   Recent (last 7 days) or current physical aggression in the ED or on unit. 0   Restraints or seclusion episode in past 72 hours. 0   Recent (last 7 days) or current verbal aggression, agitation, yelling, etc., while in the ED or unit. 0      Active psychosis. 1   Need for constant or near constant redirection (from leaving, from others, etc).  0   Intrusive or disruptive behaviors. 0   Patient requires 3 or more hours of individualized nursing care per 8-hour shift (i.e. for ADLs, meds, therapeutic interventions). 1   TOTAL 5

## 2024-07-25 PROCEDURE — 99232 SBSQ HOSP IP/OBS MODERATE 35: CPT | Performed by: PSYCHIATRY & NEUROLOGY

## 2024-07-25 PROCEDURE — 124N000002 HC R&B MH UMMC

## 2024-07-25 PROCEDURE — 250N000013 HC RX MED GY IP 250 OP 250 PS 637: Performed by: CLINICAL NURSE SPECIALIST

## 2024-07-25 PROCEDURE — 250N000013 HC RX MED GY IP 250 OP 250 PS 637: Performed by: NURSE PRACTITIONER

## 2024-07-25 PROCEDURE — 250N000013 HC RX MED GY IP 250 OP 250 PS 637: Performed by: PSYCHIATRY & NEUROLOGY

## 2024-07-25 RX ADMIN — DIVALPROEX SODIUM 500 MG: 500 TABLET, FILM COATED, EXTENDED RELEASE ORAL at 08:34

## 2024-07-25 RX ADMIN — GABAPENTIN 400 MG: 400 CAPSULE ORAL at 15:27

## 2024-07-25 RX ADMIN — HALOPERIDOL 5 MG: 5 TABLET ORAL at 08:34

## 2024-07-25 RX ADMIN — HALOPERIDOL 5 MG: 5 TABLET ORAL at 18:05

## 2024-07-25 RX ADMIN — OLANZAPINE 15 MG: 10 TABLET, ORALLY DISINTEGRATING ORAL at 08:33

## 2024-07-25 RX ADMIN — Medication 50 MCG: at 08:34

## 2024-07-25 RX ADMIN — NICOTINE POLACRILEX 4 MG: 2 LOZENGE ORAL at 18:20

## 2024-07-25 RX ADMIN — OLANZAPINE 15 MG: 10 TABLET, ORALLY DISINTEGRATING ORAL at 18:04

## 2024-07-25 RX ADMIN — DIVALPROEX SODIUM 750 MG: 500 TABLET, FILM COATED, EXTENDED RELEASE ORAL at 18:04

## 2024-07-25 RX ADMIN — GABAPENTIN 400 MG: 400 CAPSULE ORAL at 08:33

## 2024-07-25 RX ADMIN — GABAPENTIN 400 MG: 400 CAPSULE ORAL at 18:04

## 2024-07-25 RX ADMIN — NICOTINE 1 PATCH: 21 PATCH, EXTENDED RELEASE TRANSDERMAL at 08:34

## 2024-07-25 ASSESSMENT — ACTIVITIES OF DAILY LIVING (ADL)
ADLS_ACUITY_SCORE: 50
ADLS_ACUITY_SCORE: 40
ADLS_ACUITY_SCORE: 50
ADLS_ACUITY_SCORE: 50
LAUNDRY: WITH SUPERVISION
ADLS_ACUITY_SCORE: 50
ADLS_ACUITY_SCORE: 40
ADLS_ACUITY_SCORE: 50
HYGIENE/GROOMING: INDEPENDENT
ADLS_ACUITY_SCORE: 50
ADLS_ACUITY_SCORE: 40
ADLS_ACUITY_SCORE: 40
ADLS_ACUITY_SCORE: 50
ADLS_ACUITY_SCORE: 50
ORAL_HYGIENE: INDEPENDENT
ADLS_ACUITY_SCORE: 40
DRESS: INDEPENDENT
ADLS_ACUITY_SCORE: 50
ADLS_ACUITY_SCORE: 40
ADLS_ACUITY_SCORE: 50

## 2024-07-25 NOTE — PLAN OF CARE
BEH IP Unit Acuity Rating Score (UARS)  Patient is given one point for every criteria they meet.     CRITERIA SCORING   On a 72 hour hold, court hold, committed, stay of commitment, or revocation. 1    Patient LOS on BEH unit exceeds 20 days. 1  LOS: 28    Patient under guardianship, 55+, otherwise medically complex, or under age 11. 0   Suicide ideation without relief of precipitating factors. 0   Current plan for suicide. 0   Current plan for homicide. 0   Imminent risk or actual attempt to seriously harm another without relief of factors precipitating the attempt. 0   Severe dysfunction in daily living (ex: complete neglect for self care, extreme disruption in vegetative function, extreme deterioration in social interactions). 1   Recent (last 7 days) or current physical aggression in the ED or on unit. 0   Restraints or seclusion episode in past 72 hours. 0   Recent (last 7 days) or current verbal aggression, agitation, yelling, etc., while in the ED or unit. 0      Active psychosis. 1   Need for constant or near constant redirection (from leaving, from others, etc).  0   Intrusive or disruptive behaviors. 0   Patient requires 3 or more hours of individualized nursing care per 8-hour shift (i.e. for ADLs, meds, therapeutic interventions). 1   TOTAL 5

## 2024-07-25 NOTE — PLAN OF CARE
"Goal Outcome Evaluation:    Plan of Care Reviewed With: patient      Patient was notably visible on the unit. He was seen watching television with others but mostly pacing the hallway with headphones on. Patient's affect noted to be bright and hyper verbal at times. Observed to be interactive with staff. At the start of the shift, pt was in his room resting, then came out for dinner and was heard yelling in the hallway \" Prudence Gordillo for president\" and was observed running back and forth. Patient was asked to stop and he was redirectable. Patient was talking about joining the [employer] when he leaves here. Patient also kept saying that \"The doctor said ill be leaving in a couple days. The should not hold me here, I'm human too. I just someone come for 2 days and left, why can't I?\".     On check in, pt denied pain,anxiety, SI/HI/AH/VH and depression. Patient is overall pleasant, med compliant and demonstrates ability to communicate needs to staff. Nursing will continue to assess.  Vitals./76   Pulse 94   Temp 98.4  F (36.9  C) (Oral)   Resp 17   Ht 1.88 m (6' 2\")   Wt 81.8 kg (180 lb 4.8 oz)   SpO2 98%   BMI 23.15 kg/m     "

## 2024-07-25 NOTE — PLAN OF CARE
"  Problem: Adult Behavioral Health Plan of Care  Goal: Plan of Care Review  Recent Flowsheet Documentation  Taken 7/25/2024 1000 by Belle Condon RN  Patient Agreement with Plan of Care: disagrees (describe)   Goal Outcome Evaluation:    Plan of Care Reviewed With: patient               Presentation: The patient approached this writer after breakfast.  The patient stated, \"Can I have my medicine right now so that I can go back to bed.\"  The patient was calm and polite with his request and sat down in the lounge patiently waiting for his medications.      The patient is dressed in scrubs.  Affect is full ranged. Speech is clear, coherent, and circumstantial.  Mood is restless.  Thoughts are distracted.      During team, discharge planning is discussed.  The team acknowledged the patient has improved, yet, the patient continues to have intermittent symptoms that require staff interventions and redirection.        Mental health symptoms: The patient denies SI, SIB, HI, hallucinations, depression and anxiety.     Medication compliance: The patient is compliant with all medications.        Medical Concerns: The patient has no medical concerns.  Denies pain.  Blood pressure 123/83, pulse 85, temperature 97.5  F (36.4  C), resp. rate 16, height 1.88 m (6' 2\"), weight 81.8 kg (180 lb 4.8 oz), SpO2 98%.    PRN's: None      Precautions: Suicide, fall, assault, and elopement.    Continue with plan of care        "

## 2024-07-25 NOTE — PLAN OF CARE
Goal Outcome Evaluation:    Initial meeting note:    Therapist introduced self to patient and discussed psychotherapy service available to patient.     Pt response: Pt not interested currently in meeting 1:1; therapist will continue remaining available for pt     Plan: Pt was encouraged to attend groups and therapist will remain available for 1:1 sessions    Met in his room for a non billable session, he was restless but able to complete safety planning for first time. Denies any mental health symptoms, now or at admission, says mom and aunt tricked him to being hospitalized, shows no insight about mental health.

## 2024-07-25 NOTE — PLAN OF CARE
Team Note Due:  Friday    Assessment/Intervention/Current Symtoms and Care Coordination:  Chart review and met with team, discussed pt progress, symptomology, and response to treatment.  Discussed the discharge plan and any potential impediments to discharge.    Pt asked to see me.  I approached him at 1:45PM and he was in bed as usual.  He said he wanted help with getting an EBT care and housing.  I said that he would be getting a  soon and they will help with those things,  I stated that I would be arranging for treatment services.  He said he has had enough treatment.  I asked if I could get a psychiatry appointment for him. He said no that he has a psychiatrist and therapist at [facility]. I asked if I could call over there are get appointments and  he said no that he did not want me talking to them,  He then said that this discussion was ended.        Discharge Plan or Goal:  IRTS  OR  HARVEY treatment     Barriers to Discharge:  Pt is ill and does not have providers  Pt is resistant to recommendations     Referral Status:  Too early  Pt has no established providers       Medical Assistance  MA is now active.  Insurance Company Name [place] (Elig Type AX: [place] Early Expansion)  [id number]  [facility] [place]:  [place] [facility]: [facility]  File Number: [id number]  Start and expiration date of commitment:   July 19, 2024 to January 17, 2025      Court  asked for DA. DA was sent 7/10/24. Waiting for case management assignment.  Neha Humphries  Senior   [facility] Behavioral Health Assessment/Intake  Office: [phone] Fax: [phone]  [email or web address], [email or web address],   [address] [address]      Contacts:      HUSSEIN [facility]en [facility] Attorney: Victoria Christianson @ [phone]    CLIFTON for aunt.Aunt:Nita @ [phone])  Father:Subhash @ [phone]         Upcoming Meetings and Dates/Important Information and next steps:    Will refer to IRTS when less symptomatic.    Will need psychiatry appointment.    Waiting for case management assignment by court .    Pt will need provisional discharge at the time of discharge.

## 2024-07-25 NOTE — PROGRESS NOTES
"[facility], [place]   Psychiatric Progress Note        Interim History:   The patient's care was discussed with the treatment team during the daily team meeting and/or staff's chart notes were reviewed.  Staff report patient is quiet, calm and cooperative at times and other times he was observed running up an down the halls shouting. They report that he is spending most of his time in his bedroom and CTC feels that he needs to be more active on the unit in order to prove he is ready for an IRTS. Overnight patient slept for 7 hours and required no PRN medications. Patient is awaiting court .     Met with patient: Patient was seen in the conference room. He had to be awoken from his room to join us in the conference room for a meeting but he came willingly into the conference room. Patient reports he is doing good. When asked why he is spending so much time in his room and sleeping so much he reports \"because I feel like it.\" He reports no issues with his meds. It was discussed with the patient that it is being recommended that after discharge here he go to an IRTS facility. Patient had many questions about what that was and how long he would be there. It was explained to him it was a facility that could continue to manage his case and care after discharge. He was told he would have more freedoms there than here in the hospital but the program is 3 months. Patient became visibly frustrated and angry at the idea of this. He reports not wanting to spend his whole summer in a facility and asks if he can go this fall. It was explained to patient that wasn't an option. When asked if we could place a referral for IRTS patient refused, stood up and left the conference room clearly upset. Patient could be heard shouting in the hallway about us taking away his life after leaving.          Medications:     Current Facility-Administered Medications   Medication Dose Route Frequency " "Provider Last Rate Last Admin    divalproex sodium extended-release (DEPAKOTE ER) 24 hr tablet 500 mg  500 mg Oral QAM Jese Arizmendi MD   500 mg at 07/25/24 0834    divalproex sodium extended-release (DEPAKOTE ER) 24 hr tablet 750 mg  750 mg Oral At Bedtime Jese Arizmendi MD   750 mg at 07/24/24 2138    gabapentin (NEURONTIN) capsule 400 mg  400 mg Oral TID Jese Arizmendi MD   400 mg at 07/25/24 0833    haloperidol (HALDOL) tablet 5 mg  5 mg Oral BID Kaden Daniels APRN CNP   5 mg at 07/25/24 0834    haloperidol decanoate (HALDOL DECANOATE) injection 50 mg  50 mg Intramuscular Q28 Days Jese Arizmendi MD   50 mg at 07/22/24 1629    nicotine (NICODERM CQ) 21 MG/24HR 24 hr patch 1 patch  1 patch Transdermal Daily Jese Arizmendi MD   1 patch at 07/25/24 0834    OLANZapine zydis (zyPREXA) ODT tab 15 mg  15 mg Oral BID Jese Arizmendi MD   15 mg at 07/25/24 0833    Vitamin D3 (CHOLECALCIFEROL) tablet 50 mcg  50 mcg Oral At Bedtime Maria Pennington APRN CNP   50 mcg at 07/25/24 0834          Allergies:     Allergies   Allergen Reactions    Paliperidone Other (See Comments)     Other reaction(s): Extrapyramidal Symptoms   Significant EPS    Significant EPS    Pork Allergy Other (See Comments)     [employer] prohibition          Labs:   No results found for this or any previous visit (from the past 24 hour(s)).       Psychiatric Examination:     /83   Pulse 85   Temp 97.5  F (36.4  C)   Resp 16   Ht 1.88 m (6' 2\")   Wt 81.8 kg (180 lb 4.8 oz)   SpO2 98%   BMI 23.15 kg/m    Weight is 180 lbs 4.8 oz  Body mass index is 23.15 kg/m .    Orthostatic Vitals         Most Recent      Sitting Orthostatic /77 07/24 1308    Sitting Orthostatic Pulse (bpm) 89 07/24 1308    Standing Orthostatic /74 07/25 0825    Standing Orthostatic Pulse (bpm) 80 07/25 0825          Appearance: awake, alert, dressed in hospital scrubs, and slightly " unkempt  Attitude:  less cooperative  Eye Contact:  fair  Mood:  angry  Affect:  labile  Speech:  clear, coherent, rapid  Psychomotor Behavior:  no evidence of tardive dyskinesia, dystonia, or tics and intact station, gait and muscle tone  Throught Process:  linear  Associations:  no loose associations  Thought Content:  no evidence of suicidal ideation or homicidal ideation, no auditory hallucinations present, and no visual hallucinations present, grandiose delusions still present  Insight:  limited  Judgement:  limited  Oriented to:  time, person, and place  Attention Span and Concentration:  limited  Recent and Remote Memory:  fair    Clinical Global Impressions  First: 6/4 7/23/2024      Most recent:            Precautions:     Behavioral Orders   Procedures    Assault precautions    Code 1 - Restrict to Unit    Elopement precautions    Fall precautions    Routine Programming     As clinically indicated    Status 15     Every 15 minutes.    Suicide precautions: Suicide Risk: HIGH; Clinical rationale to override score: modification to the care environment     Patients on Suicide Precautions should have a Combination Diet ordered that includes a Diet selection(s) AND a Behavioral Tray selection for Safe Tray - with utensils, or Safe Tray - NO utensils       Order Specific Question:   Suicide Risk     Answer:   HIGH     Order Specific Question:   Clinical rationale to override score:     Answer:   modification to the care environment          DIagnoses:     Schizoaffective disorder, bipolar type, current episode manic, severe, with psychosis  Stimulant use disorder, per his disorder, per history  Noncompliance noncompliance with treatment         Plan:   - No Changes to medications 07/25/24. First dose of Haldol Decanoate on 7/22. Recheck Valproic Acid level on Monday.  - Encourage patient to participate in unit activities and spend less time in room  - Continue to provide structure and support and discuss  discharge plans - IRTS.     I was present with PA student who participated in the service and in the documentation of the note. I have verified the history and personally performed the physical exam and medical decision making. I agree with the assessment and plan of care as documented in the note.     Jese Arizmendi MD  [facility] Psychiatry     Total time spent was 38 minutes. Over 50% of times was spent counseling and coordination of care regarding coping skills, medication and discharge planning.

## 2024-07-25 NOTE — PLAN OF CARE
"Problem: Manic or Hypomanic Signs/Symptoms  Goal: Improved Impulse Control (Manic/Hypomanic Signs/Symptoms)  Outcome: Progressing    Patient spent most of the shift sleeping in his room. Patient awakened for dinner, and then requested medication early so he could go back to bed. Affect is flat. He has been calm and polite on approach. Patient denied anxious or depressed mood. He denies SI/SIB/HI. Appears a little preoccupied at times, but denies any auditory or visual hallucinations. No reports of pain or discomfort. Patient is medication compliant. He denies any medication side effects. No PRN requests this shift.  Patient was out walking around listening to headphone for a while before going back to bed. He expressed some frustration with being here, stating he feels trapped and doesn't know if he'll ever leave.   Patient brightened some when offered some reassurance and reminded of progress he has made.  Patient is on assault, elopement and fall precautions. No behavioral or safety concern noted this shift.     /83   Pulse 85   Temp 97.5  F (36.4  C)   Resp 16   Ht 1.88 m (6' 2\")   Wt 81.8 kg (180 lb 4.8 oz)   SpO2 98%   BMI 23.15 kg/m      "

## 2024-07-26 PROCEDURE — 250N000013 HC RX MED GY IP 250 OP 250 PS 637: Performed by: NURSE PRACTITIONER

## 2024-07-26 PROCEDURE — 250N000013 HC RX MED GY IP 250 OP 250 PS 637: Performed by: CLINICAL NURSE SPECIALIST

## 2024-07-26 PROCEDURE — 124N000002 HC R&B MH UMMC

## 2024-07-26 PROCEDURE — 250N000013 HC RX MED GY IP 250 OP 250 PS 637: Performed by: PSYCHIATRY & NEUROLOGY

## 2024-07-26 PROCEDURE — 99231 SBSQ HOSP IP/OBS SF/LOW 25: CPT | Performed by: PSYCHIATRY & NEUROLOGY

## 2024-07-26 RX ADMIN — NICOTINE POLACRILEX 4 MG: 2 LOZENGE ORAL at 19:52

## 2024-07-26 RX ADMIN — NICOTINE POLACRILEX 4 MG: 2 LOZENGE ORAL at 16:33

## 2024-07-26 RX ADMIN — OLANZAPINE 15 MG: 10 TABLET, ORALLY DISINTEGRATING ORAL at 18:08

## 2024-07-26 RX ADMIN — GABAPENTIN 400 MG: 400 CAPSULE ORAL at 14:17

## 2024-07-26 RX ADMIN — NICOTINE POLACRILEX 4 MG: 2 LOZENGE ORAL at 18:09

## 2024-07-26 RX ADMIN — HALOPERIDOL 5 MG: 5 TABLET ORAL at 18:09

## 2024-07-26 RX ADMIN — DIVALPROEX SODIUM 750 MG: 500 TABLET, FILM COATED, EXTENDED RELEASE ORAL at 18:08

## 2024-07-26 RX ADMIN — OLANZAPINE 15 MG: 10 TABLET, ORALLY DISINTEGRATING ORAL at 09:47

## 2024-07-26 RX ADMIN — GABAPENTIN 400 MG: 400 CAPSULE ORAL at 09:48

## 2024-07-26 RX ADMIN — Medication 50 MCG: at 09:47

## 2024-07-26 RX ADMIN — HALOPERIDOL 5 MG: 5 TABLET ORAL at 09:48

## 2024-07-26 RX ADMIN — DIVALPROEX SODIUM 500 MG: 500 TABLET, FILM COATED, EXTENDED RELEASE ORAL at 09:48

## 2024-07-26 RX ADMIN — GABAPENTIN 400 MG: 400 CAPSULE ORAL at 18:08

## 2024-07-26 RX ADMIN — NICOTINE 1 PATCH: 21 PATCH, EXTENDED RELEASE TRANSDERMAL at 09:49

## 2024-07-26 ASSESSMENT — ACTIVITIES OF DAILY LIVING (ADL)
ADLS_ACUITY_SCORE: 40
HYGIENE/GROOMING: INDEPENDENT
ADLS_ACUITY_SCORE: 40
LAUNDRY: WITH SUPERVISION
DRESS: INDEPENDENT
ADLS_ACUITY_SCORE: 40
LAUNDRY: WITH SUPERVISION
ADLS_ACUITY_SCORE: 40
ORAL_HYGIENE: INDEPENDENT
ADLS_ACUITY_SCORE: 40
HYGIENE/GROOMING: INDEPENDENT
ADLS_ACUITY_SCORE: 40
DRESS: INDEPENDENT
ORAL_HYGIENE: INDEPENDENT
ADLS_ACUITY_SCORE: 40
ADLS_ACUITY_SCORE: 40

## 2024-07-26 NOTE — PLAN OF CARE
"Problem: Psychotic Signs/Symptoms  Goal: Improved Behavioral Control (Psychotic Signs/Symptoms)  Outcome: Progressing     Problem: Manic or Hypomanic Signs/Symptoms  Goal: Improved Impulse Control (Manic/Hypomanic Signs/Symptoms)  Outcome: Progressing    Patient has been observed in the milieu pacing and listening to headphones. Affect is flat and blunted. Appears somewhat restless and distracted at times. He has been calm and pleasant on approach. Denies anxious or depressed mood. Denies SI/SIB/HI and hallucinations. No reports of pain or other medical concerns. Patient is present at meal times. Appetite is good. He is medication compliant. No reports of medication side effects. No PRN requests other than nicotine lozenges this shift. Patient is on suicide, fall, assault and elopement precautions. No behavioral or safety concerns noted.     /81   Pulse 107   Temp 98.7  F (37.1  C) (Temporal)   Resp 16   Ht 1.88 m (6' 2\")   Wt 81.8 kg (180 lb 4.8 oz)   SpO2 100%   BMI 23.15 kg/m      "

## 2024-07-26 NOTE — PLAN OF CARE
Team Note Due:  Friday    Assessment/Intervention/Current Symtoms and Care Coordination:  Chart review and met with team, discussed pt progress, symptomology, and response to treatment.  Discussed the discharge plan and any potential impediments to discharge.    Pt remains very symptomatic.  He wants to sleep the day away.  I called and reached aunt to discuss placement as our recommendation is for IRTS but he keeps saying he is going to his auth's house.  She would like father involved and wants me to get an CLIFTON for him.  She also said she feels like they should follow the team recommendation and it would be best to go to IRTS.  She said she would call father and then call me back this afternoon.  She did not do so.    CLIFTON for aunt.Aunt:Chela@ [phone])  Father:Said @ [phone] - aunt says to get an CLIFTON for father  Pt signed it with encouragement from Belle LA.           Discharge Plan or Goal:  IRTS       Barriers to Discharge:  Pt is ill and does not have providers  Pt is resistant to recommendations       Referral Status:  Too early as pt is symptomatic  Pt has no established providers       Medical Assistance  MA is now active.  Insurance Company Name [place] (Elig Type AX: [place] Early Expansion)  PMI # [id number]  [facility]  Pt is resisting, wants to go to aunts in [place]      Psychiatry  At one point pt said he had a psychiatrist at [facility] but he refused to give me permission to call there      Mental Health Targeted Case Management  Waiting for court  to assign the case      Legal Status:  [facility]  File Number: [id number]  Start and expiration date of commitment:   July 19, 2024 to January 17, 2025      Court  asked for DA. DA was sent 7/10/24. Waiting for case management assignment.  Neha Humphries  Senior   [facility] Behavioral Health Assessment/Intake  Office: [phone] Fax:  [phone]  [email or web address], [email or web address], [address]  [address] [address]      Contacts:    HUSSEIN Kohler  [facility] Attorney: Victoria Sammy @ [phone]    CLIFTON for aunt.Aunt:Chela@ [phone])  CLIFTON for Father:Dereck @ [phone]         Upcoming Meetings and Dates/Important Information and next steps:    Coordinate plan with aunt and father.  Will refer to IRTS when less symptomatic.    Will need psychiatry appointment.    Waiting for case management assignment by court .    Pt will need provisional discharge at the time of discharge.

## 2024-07-26 NOTE — PROGRESS NOTES
"[facility], [place]   Psychiatric Progress Note        Interim History:   The patient's care was discussed with the treatment team during the daily team meeting and/or staff's chart notes were reviewed.  Staff report patient has been quiet, calm and flat. He is med compliant but is spending most of his day in his room and in bed. Patient got 7 hours of sleep overnight and did not require any PRN medications. CTC report patient is awaiting court . It is their recommendation that patient be placed in IRTS however they are aware patient wants to be discharged to his aunt. [facility] to call Aunt and see if that is a plausible option.     Met with patient: Patient was seen in his room. He did not sit up upon our entrance but was verbally responsive to our conversation. He reports doing fine. When asked about how much time he was spending in bed he stated he got up to have breakfast and then has been lying in bed \"day dreaming.\" When asked about what he was day dreaming about her reports that he \"doesn't share\" his day dreams and refused to tell us more about them. We asked the patient if he had any questions or concerns for us and he stated he wanted us to call his aunt. We told him that CTC was going to call his Aunt. He was visibly frustrated by this and rolled away from us stating he was \"very upset\" with us. He stated that he \"won't go to another building\" referencing our prior discussion to go to an IRTS. Patient without further questions or comments.          Medications:     Current Facility-Administered Medications   Medication Dose Route Frequency Provider Last Rate Last Admin    divalproex sodium extended-release (DEPAKOTE ER) 24 hr tablet 500 mg  500 mg Oral Jese Bingham MD   500 mg at 07/26/24 0948    divalproex sodium extended-release (DEPAKOTE ER) 24 hr tablet 750 mg  750 mg Oral At Bedtime Jese Arizmendi MD   750 mg at 07/25/24 1804    gabapentin " "(NEURONTIN) capsule 400 mg  400 mg Oral TID Jese Arizmendi MD   400 mg at 07/26/24 1417    haloperidol (HALDOL) tablet 5 mg  5 mg Oral BID Kaden Daniels APRN CNP   5 mg at 07/26/24 0948    haloperidol decanoate (HALDOL DECANOATE) injection 50 mg  50 mg Intramuscular Q28 Days Jese Arizmendi MD   50 mg at 07/22/24 1629    nicotine (NICODERM CQ) 21 MG/24HR 24 hr patch 1 patch  1 patch Transdermal Daily Jese Arizmendi MD   1 patch at 07/26/24 0949    OLANZapine zydis (zyPREXA) ODT tab 15 mg  15 mg Oral BID Jese Arizmendi MD   15 mg at 07/26/24 0947    Vitamin D3 (CHOLECALCIFEROL) tablet 50 mcg  50 mcg Oral At Bedtime Maria Pennington APRN CNP   50 mcg at 07/26/24 0947          Allergies:     Allergies   Allergen Reactions    Paliperidone Other (See Comments)     Other reaction(s): Extrapyramidal Symptoms   Significant EPS    Significant EPS    Pork Allergy Other (See Comments)     [employer] prohibition          Labs:   No results found for this or any previous visit (from the past 24 hour(s)).       Psychiatric Examination:     /76 (BP Location: Left arm)   Pulse 98   Temp 97.9  F (36.6  C)   Resp 16   Ht 1.88 m (6' 2\")   Wt 81.8 kg (180 lb 4.8 oz)   SpO2 98%   BMI 23.15 kg/m    Weight is 180 lbs 4.8 oz  Body mass index is 23.15 kg/m .    Orthostatic Vitals         Most Recent      Standing Orthostatic /81 07/26 0944    Standing Orthostatic Pulse (bpm) 111 07/26 0944          Appearance: awake, alert and dressed in hospital scrubs  Attitude:  guarded and uncooperative  Eye Contact:  poor   Mood: irritable/ angry  Affect:  labile and reactive  Speech:   Sometimes pressured, sometimes slow  Psychomotor Behavior:  no evidence of tardive dyskinesia, dystonia, or tics  Throught Process:  linear and goal oriented  Associations:  no loose associations  Thought Content:  no evidence of suicidal ideation or homicidal ideation, no auditory hallucinations " present, and no visual hallucinations present  Insight:  limited  Judgement:  poor  Oriented to:  time, person, and place  Attention Span and Concentration:  limited  Recent and Remote Memory:  fair    Clinical Global Impressions  First: 6/4 7/23/2024      Most recent:            Precautions:     Behavioral Orders   Procedures    Assault precautions    Code 1 - Restrict to Unit    Elopement precautions    Fall precautions    Routine Programming     As clinically indicated    Status 15     Every 15 minutes.    Suicide precautions: Suicide Risk: HIGH; Clinical rationale to override score: modification to the care environment     Patients on Suicide Precautions should have a Combination Diet ordered that includes a Diet selection(s) AND a Behavioral Tray selection for Safe Tray - with utensils, or Safe Tray - NO utensils       Order Specific Question:   Suicide Risk     Answer:   HIGH     Order Specific Question:   Clinical rationale to override score:     Answer:   modification to the care environment          DIagnoses:     Schizoaffective disorder, bipolar type, current episode manic, severe, with psychosis  Stimulant use disorder, per his disorder, per history  Noncompliance noncompliance with treatment         Plan:   - No Changes to medications 07/26/24. First dose of Haldol Decanoate on 7/22. Recheck Valproic Acid level on Monday.   - Encourage patient to participate in unit activities and spend less time in room  - CTC to be in contact with patient's aunt to determine if she is willing to take him when he is ready for discharge     I was present with PA student who participated in the service and in the documentation of the note. I have verified the history and personally performed the physical exam and medical decision making. I agree with the assessment and plan of care as documented in the note.     Jese Arizmendi MD  [facility] Psychiatry    Total time spent was 25 minutes. Over 50% of  times was spent counseling and coordination of care regarding coping skills, medication and discharge planning.

## 2024-07-26 NOTE — PLAN OF CARE
Goal Outcome Evaluation:      Problem: Manic or Hypomanic Signs/Symptoms  Goal: Improved Sleep (Manic/Hypomanic Signs/Symptoms)  Outcome: Progressing       Patient received in bed during shift, noted to have even and unlabored breathig during safety rounds. Patient slept a total of 7 hours and no prn given. Continue with plan of care.

## 2024-07-26 NOTE — PLAN OF CARE
"  Problem: Psychotic Signs/Symptoms  Goal: Optimal Cognitive Function (Psychotic Signs/Symptoms)  Intervention: Support and Promote Cognitive Ability  Recent Flowsheet Documentation  Taken 7/26/2024 1200 by Belle Condon RN  Trust Relationship/Rapport:   care explained   choices provided   questions encouraged   thoughts/feelings acknowledged   Goal Outcome Evaluation:    Plan of Care Reviewed With: patient            Presentation:  The patient is isolative and withdrawn to his room.  He is observed in the milieu for meals and am meds.  The patient is dressed in scrubs.  No ADL's performed this shift.  The patient has a flat and blunted affect.  Speech is circumstantial, clear and coherent.  His mood is withdrawn, quiet, and frustrated.  The patient stated, he is sleeping until he discharges.  The patient is notified the team wants the patient to attend groups.  The patient stated, \"I will think about it.\"      Mental health symptoms: The patient denies SI, SIB, HI, depression and anxiety.  The patient is withdrawn, otherwise behaviorally appropriate this shift.        Medication compliance: The patient is compliant with his scheduled meds.        Medical Concerns: The patient denies medical concerns and denies pain.        PRN's: none      Precautions: suicide, fall, assault, and elopement.    Continue with plan of care            "

## 2024-07-26 NOTE — PLAN OF CARE
BEH IP Unit Acuity Rating Score (UARS)  Patient is given one point for every criteria they meet.     CRITERIA SCORING   On a 72 hour hold, court hold, committed, stay of commitment, or revocation. 1    Patient LOS on BEH unit exceeds 20 days. 1  LOS: 29    Patient under guardianship, 55+, otherwise medically complex, or under age 11. 0   Suicide ideation without relief of precipitating factors. 0   Current plan for suicide. 0   Current plan for homicide. 0   Imminent risk or actual attempt to seriously harm another without relief of factors precipitating the attempt. 0   Severe dysfunction in daily living (ex: complete neglect for self care, extreme disruption in vegetative function, extreme deterioration in social interactions). 1   Recent (last 7 days) or current physical aggression in the ED or on unit. 0   Restraints or seclusion episode in past 72 hours. 0   Recent (last 7 days) or current verbal aggression, agitation, yelling, etc., while in the ED or unit. 0      Active psychosis. 1   Need for constant or near constant redirection (from leaving, from others, etc).  0   Intrusive or disruptive behaviors. 0   Patient requires 3 or more hours of individualized nursing care per 8-hour shift (i.e. for ADLs, meds, therapeutic interventions). 1   TOTAL 5

## 2024-07-27 PROCEDURE — 250N000013 HC RX MED GY IP 250 OP 250 PS 637: Performed by: NURSE PRACTITIONER

## 2024-07-27 PROCEDURE — 124N000002 HC R&B MH UMMC

## 2024-07-27 PROCEDURE — G0177 OPPS/PHP; TRAIN & EDUC SERV: HCPCS

## 2024-07-27 PROCEDURE — 250N000013 HC RX MED GY IP 250 OP 250 PS 637: Performed by: CLINICAL NURSE SPECIALIST

## 2024-07-27 PROCEDURE — 250N000013 HC RX MED GY IP 250 OP 250 PS 637: Performed by: PSYCHIATRY & NEUROLOGY

## 2024-07-27 RX ADMIN — HALOPERIDOL 5 MG: 5 TABLET ORAL at 18:29

## 2024-07-27 RX ADMIN — OLANZAPINE 15 MG: 10 TABLET, ORALLY DISINTEGRATING ORAL at 18:29

## 2024-07-27 RX ADMIN — DIVALPROEX SODIUM 500 MG: 500 TABLET, FILM COATED, EXTENDED RELEASE ORAL at 08:24

## 2024-07-27 RX ADMIN — GABAPENTIN 400 MG: 400 CAPSULE ORAL at 08:25

## 2024-07-27 RX ADMIN — GABAPENTIN 400 MG: 400 CAPSULE ORAL at 18:29

## 2024-07-27 RX ADMIN — NICOTINE POLACRILEX 4 MG: 2 LOZENGE ORAL at 18:30

## 2024-07-27 RX ADMIN — NICOTINE POLACRILEX 4 MG: 2 LOZENGE ORAL at 20:34

## 2024-07-27 RX ADMIN — GABAPENTIN 400 MG: 400 CAPSULE ORAL at 14:05

## 2024-07-27 RX ADMIN — NICOTINE 1 PATCH: 21 PATCH, EXTENDED RELEASE TRANSDERMAL at 08:24

## 2024-07-27 RX ADMIN — OLANZAPINE 15 MG: 10 TABLET, ORALLY DISINTEGRATING ORAL at 08:24

## 2024-07-27 RX ADMIN — NICOTINE POLACRILEX 4 MG: 2 LOZENGE ORAL at 19:35

## 2024-07-27 RX ADMIN — HALOPERIDOL 5 MG: 5 TABLET ORAL at 08:24

## 2024-07-27 RX ADMIN — Medication 50 MCG: at 08:23

## 2024-07-27 RX ADMIN — NICOTINE POLACRILEX 4 MG: 2 LOZENGE ORAL at 16:27

## 2024-07-27 RX ADMIN — DIVALPROEX SODIUM 750 MG: 500 TABLET, FILM COATED, EXTENDED RELEASE ORAL at 18:28

## 2024-07-27 ASSESSMENT — ACTIVITIES OF DAILY LIVING (ADL)
ADLS_ACUITY_SCORE: 40
DRESS: INDEPENDENT
ORAL_HYGIENE: INDEPENDENT
ADLS_ACUITY_SCORE: 40
ADLS_ACUITY_SCORE: 40
HYGIENE/GROOMING: INDEPENDENT
ADLS_ACUITY_SCORE: 40
ORAL_HYGIENE: INDEPENDENT
ADLS_ACUITY_SCORE: 40
HYGIENE/GROOMING: INDEPENDENT
LAUNDRY: WITH SUPERVISION
ADLS_ACUITY_SCORE: 40
LAUNDRY: WITH SUPERVISION
DRESS: INDEPENDENT
ADLS_ACUITY_SCORE: 40

## 2024-07-27 NOTE — PLAN OF CARE
"  Problem: Psychotic Signs/Symptoms  Goal: Optimal Cognitive Function (Psychotic Signs/Symptoms)  Intervention: Support and Promote Cognitive Ability  Recent Flowsheet Documentation  Taken 7/27/2024 1100 by Belle Condon RN  Trust Relationship/Rapport:   care explained   emotional support provided   empathic listening provided   questions answered   questions encouraged   thoughts/feelings acknowledged   Goal Outcome Evaluation:    Plan of Care Reviewed With: patient                 Presentation:  The patient remains isolative to this room.  Observed in the hallway once briefly.  The patient is calm and polite.  Affect is flat and blunted.  Speech is clear, coherent with poverty of content.  Mood is reserved.  Thoughts are restricted.        Mental health symptoms: The patient denies SI, SIB, HI, hallucinations, depression and anxiety.  The patient may be depressed AEB sleeping all day.  The patient does not appears to be responding to internal stimuli.       Medication compliance: The patient is compliant with all of his medications.        Medical Concerns: The patient has no medical concerns this shift.  Denies pain.  Blood pressure 121/81, pulse 107, temperature 98.7  F (37.1  C), temperature source Temporal, resp. rate 16, height 1.88 m (6' 2\"), weight 81.8 kg (180 lb 4.8 oz), SpO2 100%.              PRN's:      Precautions:    Continue with plan of care        "

## 2024-07-27 NOTE — PLAN OF CARE
Problem: Manic or Hypomanic Signs/Symptoms  Goal: Improved Impulse Control (Manic/Hypomanic Signs/Symptoms)  Outcome: Progressing     Problem: Manic or Hypomanic Signs/Symptoms  Goal: Improved Mood Symptoms (Manic/Hypomanic Signs/Symptoms)  Outcome: Progressing    Patient has been observed out in the milieu most of the shift. Mood is somewhat labile. Presents with a flat affect. Reports feeling anxious and sad about not being discharged yet, but otherwise denies symptoms. Patient denies SI/SIB/HI and hallucinations. Patient encouraged to continue working with team towards discharge and to increase activity on unit during the day. Patient declined group stating he does not like them as much as he did at first. He has been watching television, listening to music, and reports that he started reading a book this afternoon. Patient was out a meal times. Appetite is good. He is medication compliant. No PRN requests other than nicotine gum. He denies pain or medication side effects.   Patient observed pacing intermittently after taking medications. Appears somewhat restless and distracted at times. No behavioral concerns noted. He is pleasant on approach and has been polite with requests. Continues on suicide, assault and elopement precautions.

## 2024-07-28 PROCEDURE — 250N000013 HC RX MED GY IP 250 OP 250 PS 637: Performed by: NURSE PRACTITIONER

## 2024-07-28 PROCEDURE — 250N000013 HC RX MED GY IP 250 OP 250 PS 637: Performed by: CLINICAL NURSE SPECIALIST

## 2024-07-28 PROCEDURE — 250N000013 HC RX MED GY IP 250 OP 250 PS 637: Performed by: PSYCHIATRY & NEUROLOGY

## 2024-07-28 PROCEDURE — 124N000002 HC R&B MH UMMC

## 2024-07-28 RX ADMIN — GABAPENTIN 400 MG: 400 CAPSULE ORAL at 18:00

## 2024-07-28 RX ADMIN — GABAPENTIN 400 MG: 400 CAPSULE ORAL at 14:53

## 2024-07-28 RX ADMIN — HALOPERIDOL 5 MG: 5 TABLET ORAL at 18:01

## 2024-07-28 RX ADMIN — NICOTINE POLACRILEX 4 MG: 2 LOZENGE ORAL at 17:46

## 2024-07-28 RX ADMIN — OLANZAPINE 15 MG: 10 TABLET, ORALLY DISINTEGRATING ORAL at 18:00

## 2024-07-28 RX ADMIN — GABAPENTIN 400 MG: 400 CAPSULE ORAL at 08:07

## 2024-07-28 RX ADMIN — Medication 50 MCG: at 08:06

## 2024-07-28 RX ADMIN — NICOTINE 1 PATCH: 21 PATCH, EXTENDED RELEASE TRANSDERMAL at 08:07

## 2024-07-28 RX ADMIN — HALOPERIDOL 5 MG: 5 TABLET ORAL at 08:07

## 2024-07-28 RX ADMIN — OLANZAPINE 15 MG: 10 TABLET, ORALLY DISINTEGRATING ORAL at 08:07

## 2024-07-28 RX ADMIN — DIVALPROEX SODIUM 500 MG: 500 TABLET, FILM COATED, EXTENDED RELEASE ORAL at 08:07

## 2024-07-28 RX ADMIN — DIVALPROEX SODIUM 750 MG: 500 TABLET, FILM COATED, EXTENDED RELEASE ORAL at 18:00

## 2024-07-28 ASSESSMENT — ACTIVITIES OF DAILY LIVING (ADL)
ADLS_ACUITY_SCORE: 40
ORAL_HYGIENE: INDEPENDENT
HYGIENE/GROOMING: INDEPENDENT
DRESS: INDEPENDENT
ADLS_ACUITY_SCORE: 40
DRESS: INDEPENDENT
ADLS_ACUITY_SCORE: 40
LAUNDRY: WITH SUPERVISION
ADLS_ACUITY_SCORE: 40
HYGIENE/GROOMING: INDEPENDENT
LAUNDRY: WITH SUPERVISION
ADLS_ACUITY_SCORE: 40
ORAL_HYGIENE: INDEPENDENT
ADLS_ACUITY_SCORE: 40

## 2024-07-28 NOTE — PLAN OF CARE
Problem: Psychotic Signs/Symptoms  Goal: Optimal Cognitive Function (Psychotic Signs/Symptoms)  Intervention: Support and Promote Cognitive Ability  Recent Flowsheet Documentation  Taken 7/28/2024 1000 by Belle Condon RN  Trust Relationship/Rapport:   care explained   choices provided   questions encouraged   thoughts/feelings acknowledged   Goal Outcome Evaluation:    Plan of Care Reviewed With: patient                 Presentation: The patient is observed in the milieu only for medications and meals.  The patient is calm and cooperative.  Affect is flat and blunted.  Speech is clear, relevant and poverty of content.  Mood is calm.  Thoughts are blocked.  The patient is approachable otherwise avoids social contact.      Mental health symptoms: The patient answers no to mental health symptoms.        Medication compliance: The patient is compliant with all medications.      Medical Concerns: The patient has no medical concerns or complaints.      PRN's: none      Precautions: assault and elopement    Continue with plan of care

## 2024-07-28 NOTE — PLAN OF CARE
Rehab Group    Start time: 1655  End time: 1750  Patient time total: 45 minutes    attended full group    #8 attended   Group Type: occupational therapy   Group Topic Covered: coping skills and healthy leisure time   Group Session Detail: OT: Education on healthy leisure and creative hands-on endeavor (bookmarks) to increase concentration, focus, attention to task/detail, decision making, problem solving, frustration tolerance, task follow through, coping with stress, relaxation healthy leisure engagement, creative expression, and social engagement    Patient Response/Contribution:  cooperative with task, actively engaged, and Semi-hyperverbal   Patient Detail: Pt arrived late to group and stayed for remainder. Pt sat at a table alone and did not engage in social interactions with peers; pt engaged with therapist on approach and intermittently was semi-hyperverbal with therapist. Pt attended to task for duration of attendance and appeared more relaxed with while actively engaged in task.       Train & Education Service Per Session 45 + Minutes () OT Group code    Patient Active Problem List   Diagnosis    Anxiety    Adjustment disorder with anxious mood    Polysubstance abuse (H)    Manic behavior (H)    Psychosis (H)    Bipolar affective disorder, current episode manic (H)    Schizoaffective disorder, bipolar type (H)    Suicidal ideation    Agitation    Itzel (H)

## 2024-07-28 NOTE — PLAN OF CARE
"Problem: Psychotic Signs/Symptoms  Goal: Improved Behavioral Control (Psychotic Signs/Symptoms)  Outcome: Progressing     Problem: Manic or Hypomanic Signs/Symptoms  Goal: Improved Impulse Control (Manic/Hypomanic Signs/Symptoms)  Outcome: Progressing    Patient has been quiet and withdrawn the majority of the shift. He spent some time watching television with peers, but otherwise avoids social interactions. Affect is flat and blunted. Patient reports feeling down about not being discharged yet, but otherwise denies symptoms. He denies SI/SIB/HI and hallucinations. No reports of pain or other medical concerns. Patient is present at meal times. Appetite is good. He is medication compliant. No reports of medication side effects or requests for PRN medication other than nicotine gum.     Not long after taking hs medications, patient was reported to come up to the desk, unprovoked, screaming at staff \"Get me out of here! I need fresh air!\" When attempting to redirect patient from yelling, he responded with \"I don't give a f*ck!\"   Patient also stated that if he wasn't discharged tomorrow that he would have to be placed on a 10:1 instead of 2:1.   Patient stopped yelling with continued prompts and eventually went back to his room where he has been resting since. No further outbursts or threatening behaviors. Patient continues on suicide, elopement and assault precautions.       /74   Pulse 88   Temp 97.6  F (36.4  C) (Oral)   Resp 16   Ht 1.88 m (6' 2\")   Wt 82.2 kg (181 lb 4.8 oz)   SpO2 98%   BMI 23.28 kg/m      "

## 2024-07-29 PROCEDURE — 250N000013 HC RX MED GY IP 250 OP 250 PS 637: Performed by: PSYCHIATRY & NEUROLOGY

## 2024-07-29 PROCEDURE — 250N000013 HC RX MED GY IP 250 OP 250 PS 637: Performed by: CLINICAL NURSE SPECIALIST

## 2024-07-29 PROCEDURE — 124N000002 HC R&B MH UMMC

## 2024-07-29 PROCEDURE — 250N000013 HC RX MED GY IP 250 OP 250 PS 637: Performed by: NURSE PRACTITIONER

## 2024-07-29 PROCEDURE — 99233 SBSQ HOSP IP/OBS HIGH 50: CPT | Performed by: PSYCHIATRY & NEUROLOGY

## 2024-07-29 RX ADMIN — NICOTINE 1 PATCH: 21 PATCH, EXTENDED RELEASE TRANSDERMAL at 08:52

## 2024-07-29 RX ADMIN — HALOPERIDOL 5 MG: 5 TABLET ORAL at 18:25

## 2024-07-29 RX ADMIN — DIVALPROEX SODIUM 500 MG: 500 TABLET, FILM COATED, EXTENDED RELEASE ORAL at 08:45

## 2024-07-29 RX ADMIN — GABAPENTIN 400 MG: 400 CAPSULE ORAL at 18:27

## 2024-07-29 RX ADMIN — OLANZAPINE 15 MG: 10 TABLET, ORALLY DISINTEGRATING ORAL at 18:27

## 2024-07-29 RX ADMIN — GABAPENTIN 400 MG: 400 CAPSULE ORAL at 13:38

## 2024-07-29 RX ADMIN — Medication 50 MCG: at 08:44

## 2024-07-29 RX ADMIN — DIVALPROEX SODIUM 750 MG: 500 TABLET, FILM COATED, EXTENDED RELEASE ORAL at 18:27

## 2024-07-29 RX ADMIN — OLANZAPINE 15 MG: 10 TABLET, ORALLY DISINTEGRATING ORAL at 08:45

## 2024-07-29 RX ADMIN — NICOTINE POLACRILEX 4 MG: 2 LOZENGE ORAL at 20:48

## 2024-07-29 RX ADMIN — GABAPENTIN 400 MG: 400 CAPSULE ORAL at 08:44

## 2024-07-29 RX ADMIN — NICOTINE POLACRILEX 4 MG: 2 LOZENGE ORAL at 18:30

## 2024-07-29 RX ADMIN — NICOTINE POLACRILEX 4 MG: 2 LOZENGE ORAL at 10:26

## 2024-07-29 RX ADMIN — HALOPERIDOL 5 MG: 5 TABLET ORAL at 08:51

## 2024-07-29 ASSESSMENT — ACTIVITIES OF DAILY LIVING (ADL)
ADLS_ACUITY_SCORE: 40
ADLS_ACUITY_SCORE: 40
ORAL_HYGIENE: INDEPENDENT
ADLS_ACUITY_SCORE: 40
HYGIENE/GROOMING: INDEPENDENT
ADLS_ACUITY_SCORE: 40
DRESS: INDEPENDENT
LAUNDRY: WITH SUPERVISION
ADLS_ACUITY_SCORE: 40

## 2024-07-29 NOTE — PROGRESS NOTES
"[facility], [place]   Psychiatric Progress Note        Interim History:     The patient's care was discussed with the treatment team during the daily team meeting and/or staff's chart notes were reviewed.  Staff report patient had a quiet weekend. He slept for 7 hours last night, was compliant with meds and care, denied all MI symptoms. Got agitated once on Sunday yelling that he wanted a fresh air, see RN's note below:    \"Patient has been quiet and withdrawn the majority of the shift. He spent some time watching television with peers, but otherwise avoids social interactions. Affect is flat and blunted. Patient reports feeling down about not being discharged yet, but otherwise denies symptoms. He denies SI/SIB/HI and hallucinations. No reports of pain or other medical concerns. Patient is present at meal times. Appetite is good. He is medication compliant. No reports of medication side effects or requests for PRN medication other than nicotine gum.      Not long after taking hs medications, patient was reported to come up to the desk, unprovoked, screaming at staff \"Get me out of here! I need fresh air!\" When attempting to redirect patient from yelling, he responded with \"I don't give a f*ck!\"   Patient also stated that if he wasn't discharged tomorrow that he would have to be placed on a 10:1 instead of 2:1.   Patient stopped yelling with continued prompts and eventually went back to his room where he has been resting since. No further outbursts or threatening behaviors. Patient continues on suicide, elopement and assault precautions.      /74   Pulse 88   Temp 97.6  F (36.4  C) (Oral)   Resp 16   Ht 1.88 m (6' 2\")   Wt 82.2 kg (181 lb 4.8 oz)   SpO2 98%   BMI 23.28 kg/m \"    Met with patient: Patient was seen in the conference room in presence of PA student. Patient appeared to be somewhat irritated and preferred to talk to PA student and not this provider. About me he " "sarcastically said that \"doctor is a one who calls all the shots and I would rather talk to a student\". We talked again about discharge plans. Serafin first said that if we find for him [facility] that would allow him to smoke outside and use his Ipad to watch music videos, he would go there. He started demanding that we would find such a place right away and discharge him there today. We explained that this might not happen today, but might happen in a few days. Serafin, then, stated that he would rather go to his auntie and not to IRTS. Asked us to call  and ask her to take him home. We asked for aunthu's phone number and he provided it to us. He had no more questions or concerns, denied med side effects. We encouraged him to allow blood work and he promised to do that.    Shortly after visit with the patient I had 20 min long phone conversation with his aunt Chela. We discussed Serafin's progress. Aunt said that he was doing definitely better compared with how he was when she brought him to this hospital. She honestly said that she would, rather, not take him to her home and would have him go to IRTS. She promised to tell this to Serafin and convince him to go to IRTS. She thanked us for the care provided to Serafin and had no more questions or concerns.          Medications:     Current Facility-Administered Medications   Medication Dose Route Frequency Provider Last Rate Last Admin    divalproex sodium extended-release (DEPAKOTE ER) 24 hr tablet 500 mg  500 mg Oral QAJese Dominguez MD   500 mg at 07/29/24 0845    divalproex sodium extended-release (DEPAKOTE ER) 24 hr tablet 750 mg  750 mg Oral At Bedtime Jese Arizmendi MD   750 mg at 07/28/24 1800    gabapentin (NEURONTIN) capsule 400 mg  400 mg Oral TID Jese Arizmendi MD   400 mg at 07/29/24 1338    haloperidol (HALDOL) tablet 5 mg  5 mg Oral BID Kaden Daniels APRN CNP   5 mg at 07/29/24 0851    haloperidol decanoate (HALDOL DECANOATE) " "injection 50 mg  50 mg Intramuscular Q28 Days Jese Arizmendi MD   50 mg at 07/22/24 1629    nicotine (NICODERM CQ) 21 MG/24HR 24 hr patch 1 patch  1 patch Transdermal Daily Jese Arizmendi MD   1 patch at 07/29/24 0852    OLANZapine zydis (zyPREXA) ODT tab 15 mg  15 mg Oral BID Jese Arizmendi MD   15 mg at 07/29/24 0845    Vitamin D3 (CHOLECALCIFEROL) tablet 50 mcg  50 mcg Oral At Bedtime Maria Pennington APRN CNP   50 mcg at 07/29/24 0844          Allergies:     Allergies   Allergen Reactions    Paliperidone Other (See Comments)     Other reaction(s): Extrapyramidal Symptoms   Significant EPS    Significant EPS    Pork Allergy Other (See Comments)     Tenriism prohibition          Labs:   No results found for this or any previous visit (from the past 24 hour(s)).       Psychiatric Examination:     /86   Pulse 82   Temp 97.8  F (36.6  C) (Oral)   Resp 16   Ht 1.88 m (6' 2\")   Wt 82.2 kg (181 lb 4.8 oz)   SpO2 99%   BMI 23.28 kg/m    Weight is 181 lbs 4.8 oz  Body mass index is 23.28 kg/m .    Orthostatic Vitals         Most Recent      Standing Orthostatic /80 07/29 0827    Standing Orthostatic Pulse (bpm) 96 07/29 0827           Appearance: awake, alert and dressed in hospital scrubs  Attitude: slightly less guarded and uncooperative  Eye Contact:  partial  Mood: irritable   Affect:  labile and reactive  Speech:   Sometimes pressured, sometimes slow  Psychomotor Behavior:  no evidence of tardive dyskinesia, dystonia, or tics  Throught Process: poorly organized  Associations:  no loose associations  Thought Content:  no evidence of suicidal ideation or homicidal ideation, no auditory hallucinations present, and no visual hallucinations present  Insight:  limited  Judgement:  poor  Oriented to:  time, person, and place  Attention Span and Concentration:  limited  Recent and Remote Memory:  fair    Clinical Global Impressions  First: 6/4 7/23/2024      Most recent:         "    Precautions:     Behavioral Orders   Procedures    Assault precautions    Code 1 - Restrict to Unit    Elopement precautions    Fall precautions    Routine Programming     As clinically indicated    Status 15     Every 15 minutes.    Suicide precautions: Suicide Risk: HIGH; Clinical rationale to override score: modification to the care environment     Patients on Suicide Precautions should have a Combination Diet ordered that includes a Diet selection(s) AND a Behavioral Tray selection for Safe Tray - with utensils, or Safe Tray - NO utensils       Order Specific Question:   Suicide Risk     Answer:   HIGH     Order Specific Question:   Clinical rationale to override score:     Answer:   modification to the care environment          DIagnoses:     Schizoaffective disorder, bipolar type, current episode manic, severe, with psychosis  Stimulant use disorder, per his disorder, per history  Noncompliance noncompliance with treatment         Plan:   - No Changes to medications 07/29/24. First dose of Haldol Decanoate on 7/22. Recheck Valproic Acid level on Tuesday. Will continue to encourage to consider going to IRTS, See discussion above.   - Encourage patient to participate in unit activities and spend less time in room     I was present with PA student who participated in the service and in the documentation of the note. I have verified the history and personally performed the physical exam and medical decision making. I agree with the assessment and plan of care as documented in the note.     Jese Arizmendi MD  [facility]    Total time spent was 52 minutes. Over 50% of times was spent counseling and coordination of care regarding coping skills, medication and discharge planning.

## 2024-07-29 NOTE — PLAN OF CARE
"Goal Outcome Evaluation: Patient denies any new physical or MH symptoms.       Problem: Adult Behavioral Health Plan of Care  Goal: Absence of New-Onset Illness or Injury  Outcome: Progressing    Behavioral  Pt slept 7 hours overnight; eating and hydrating adequately. Compliant with medications. Non compliant with cares/blood draw - Pt stated he declined blood draw because he just wanted to be left alone. Neglecting ADL's.  no behavioral escalation or concerns noted this shift. Guarded, pleasant and cooperative upon approach; speech fluent and appropriate in context; Pt isolative and withdrawn to room/self; did not attend groups; denied SI, SIB, HI, and hallucinations;  Pt engaged in appropriate conversation, discussing travel and future oriented goals. After breakfast and morning medications, pt retreated back to  his room; Pt appears depressed, sad and a withdrawn.After discussion with the doctor J became upset about hospitalization and labs. Pt stated he was frustrated and wanted to \"freak out.\" Pt was redirectable and able to contract that he will not \"freak out.\" Pt then stated he will stay \"today.\"    Medical    No complaints of physical pain/discomfort this shift. Vital signs stable. Temp: 97.8  F (36.6  C) Temp src: Oral BP: 118/86 Pulse: 82     SpO2: 99 % O2 Device: None (Room air)       PRNS: No PRN's given this shift    Plan  Plan TBD; Patient is committed and Jarvised.        "

## 2024-07-29 NOTE — PLAN OF CARE
BEH IP Unit Acuity Rating Score (UARS)  Patient is given one point for every criteria they meet.     CRITERIA SCORING   On a 72 hour hold, court hold, committed, stay of commitment, or revocation. 1    Patient LOS on BEH unit exceeds 20 days. 1  LOS: 32    Patient under guardianship, 55+, otherwise medically complex, or under age 11. 0   Suicide ideation without relief of precipitating factors. 0   Current plan for suicide. 0   Current plan for homicide. 0   Imminent risk or actual attempt to seriously harm another without relief of factors precipitating the attempt. 0   Severe dysfunction in daily living (ex: complete neglect for self care, extreme disruption in vegetative function, extreme deterioration in social interactions). 1   Recent (last 7 days) or current physical aggression in the ED or on unit. 0   Restraints or seclusion episode in past 72 hours. 0   Recent (last 7 days) or current verbal aggression, agitation, yelling, etc., while in the ED or unit. 0      Active psychosis. 1   Need for constant or near constant redirection (from leaving, from others, etc).  0   Intrusive or disruptive behaviors. 0   Patient requires 3 or more hours of individualized nursing care per 8-hour shift (i.e. for ADLs, meds, therapeutic interventions). 1   TOTAL 5

## 2024-07-30 LAB — VALPROATE SERPL-MCNC: 72.4 UG/ML

## 2024-07-30 PROCEDURE — 250N000013 HC RX MED GY IP 250 OP 250 PS 637: Performed by: CLINICAL NURSE SPECIALIST

## 2024-07-30 PROCEDURE — 99232 SBSQ HOSP IP/OBS MODERATE 35: CPT | Performed by: PSYCHIATRY & NEUROLOGY

## 2024-07-30 PROCEDURE — 250N000013 HC RX MED GY IP 250 OP 250 PS 637: Performed by: PSYCHIATRY & NEUROLOGY

## 2024-07-30 PROCEDURE — 36415 COLL VENOUS BLD VENIPUNCTURE: CPT | Performed by: PSYCHIATRY & NEUROLOGY

## 2024-07-30 PROCEDURE — 124N000002 HC R&B MH UMMC

## 2024-07-30 PROCEDURE — 80164 ASSAY DIPROPYLACETIC ACD TOT: CPT | Performed by: PSYCHIATRY & NEUROLOGY

## 2024-07-30 PROCEDURE — 250N000013 HC RX MED GY IP 250 OP 250 PS 637: Performed by: NURSE PRACTITIONER

## 2024-07-30 RX ADMIN — OLANZAPINE 15 MG: 10 TABLET, ORALLY DISINTEGRATING ORAL at 11:35

## 2024-07-30 RX ADMIN — GABAPENTIN 400 MG: 400 CAPSULE ORAL at 16:14

## 2024-07-30 RX ADMIN — DIVALPROEX SODIUM 500 MG: 500 TABLET, FILM COATED, EXTENDED RELEASE ORAL at 11:34

## 2024-07-30 RX ADMIN — GABAPENTIN 400 MG: 400 CAPSULE ORAL at 11:37

## 2024-07-30 RX ADMIN — GABAPENTIN 400 MG: 400 CAPSULE ORAL at 18:15

## 2024-07-30 RX ADMIN — Medication 50 MCG: at 11:35

## 2024-07-30 RX ADMIN — DIVALPROEX SODIUM 750 MG: 500 TABLET, FILM COATED, EXTENDED RELEASE ORAL at 18:13

## 2024-07-30 RX ADMIN — HALOPERIDOL 5 MG: 5 TABLET ORAL at 18:14

## 2024-07-30 RX ADMIN — NICOTINE POLACRILEX 4 MG: 2 LOZENGE ORAL at 18:29

## 2024-07-30 RX ADMIN — OLANZAPINE 15 MG: 10 TABLET, ORALLY DISINTEGRATING ORAL at 18:14

## 2024-07-30 ASSESSMENT — ACTIVITIES OF DAILY LIVING (ADL)
ADLS_ACUITY_SCORE: 40
ORAL_HYGIENE: INDEPENDENT
ADLS_ACUITY_SCORE: 40
DRESS: INDEPENDENT
ADLS_ACUITY_SCORE: 40
ADLS_ACUITY_SCORE: 40
ORAL_HYGIENE: INDEPENDENT
HYGIENE/GROOMING: INDEPENDENT
ADLS_ACUITY_SCORE: 40
HYGIENE/GROOMING: INDEPENDENT
DRESS: INDEPENDENT
ADLS_ACUITY_SCORE: 40

## 2024-07-30 NOTE — PLAN OF CARE
BEH IP Unit Acuity Rating Score (UARS)  Patient is given one point for every criteria they meet.     CRITERIA SCORING   On a 72 hour hold, court hold, committed, stay of commitment, or revocation. 1    Patient LOS on BEH unit exceeds 20 days. 1  LOS: 33    Patient under guardianship, 55+, otherwise medically complex, or under age 11. 0   Suicide ideation without relief of precipitating factors. 0   Current plan for suicide. 0   Current plan for homicide. 0   Imminent risk or actual attempt to seriously harm another without relief of factors precipitating the attempt. 0   Severe dysfunction in daily living (ex: complete neglect for self care, extreme disruption in vegetative function, extreme deterioration in social interactions). 1   Recent (last 7 days) or current physical aggression in the ED or on unit. 0   Restraints or seclusion episode in past 72 hours. 0   Recent (last 7 days) or current verbal aggression, agitation, yelling, etc., while in the ED or unit. 0      Active psychosis. 1   Need for constant or near constant redirection (from leaving, from others, etc).  0   Intrusive or disruptive behaviors. 0   Patient requires 3 or more hours of individualized nursing care per 8-hour shift (i.e. for ADLs, meds, therapeutic interventions). 1   TOTAL 5

## 2024-07-30 NOTE — PLAN OF CARE
Problem: Suicide Risk  Goal: Absence of Self-Harm  Intervention: Assess Risk to Self and Maintain Safety  Recent Flowsheet Documentation  Taken 7/29/2024 1754 by Anne Marie Bear RN  Self-Harm Prevention: environmental self-harm risks assessed     Problem: Psychotic Signs/Symptoms  Goal: Optimal Cognitive Function (Psychotic Signs/Symptoms)  Intervention: Support and Promote Cognitive Ability  Recent Flowsheet Documentation  Taken 7/29/2024 1807 by Anne Marie Bear RN  Trust Relationship/Rapport:   choices provided   emotional support provided   Goal Outcome Evaluation: progressing.    Plan of Care Reviewed With: patient          Pt spent most of the day in his room, resting and napping in bed, but emerged for meals and demonstrated good appetite and hydration. He also snacked regularly throughout the evening. After dinner, he remained in the lounge area, ambulating and showing some signs of engagement. However, his affect was flat and blunt, and his mood appeared sad and depressed, although he remained calm and cooperative. He accepted his hs medication without issue and requested PRN nicotine lozenges, which were administered twice. Notably, pt initiated a conversation with staff about a phone call he had with his aunt, during which he agreed to transition to an Intensive Residential Treatment (IRT) program that will permit him to use his electronic devices (iPad) and smoke cigarettes. Throughout the shift, pt did not exhibit any aggressive behavior, denied experiencing pain or psychotic symptoms.

## 2024-07-30 NOTE — PLAN OF CARE
Problem: Psychotic Signs/Symptoms  Goal: Improved Sleep (Psychotic Signs/Symptoms)  Recent Flowsheet Documentation  Taken 7/30/2024 0644 by Anne Marie Bear RN  Mutually Determined Action Steps (Improved Sleep): sleeps 4-6 hours at night  7/30/2024 0054 by Anne Marie Bear RN  Outcome: Progressing  Flowsheets (Taken 7/30/2024 0054)  Mutually Determined Action Steps (Improved Sleep): sleeps 4-6 hours at night   Goal Outcome Evaluation:    Plan of Care Reviewed With: patient          Pt was received in bed, resting. During every 15-minute safety check, pt was monitored and observed to be sleeping peacefully. Pt slept 6.75 hours with even respiration and non-labored breathing. Pt did not report any acute concerns.

## 2024-07-30 NOTE — PROGRESS NOTES
"[facility], [place]   Psychiatric Progress Note        Interim History:     The patient's care was discussed with the treatment team during the daily team meeting and/or staff's chart notes were reviewed.  Staff report patient slept for about 6.75 hours last night. He had limited interactions with staff and peers, was compliant with care and medications. Went to one or two groups. Talked on the phone to his aunty who convinced him to go to IRTS, shared with staff that he would like to go to a facility that would allow him to use his Ipad and smoke outside, see RN's note below:    \"Pt spent most of the day in his room, resting and napping in bed, but emerged for meals and demonstrated good appetite and hydration. He also snacked regularly throughout the evening. After dinner, he remained in the lounge area, ambulating and showing some signs of engagement. However, his affect was flat and blunt, and his mood appeared sad and depressed, although he remained calm and cooperative. He accepted his hs medication without issue and requested PRN nicotine lozenges, which were administered twice. Notably, pt initiated a conversation with staff about a phone call he had with his aunt, during which he agreed to transition to an Intensive Residential Treatment (IRT) program that will permit him to use his electronic devices (iPad) and smoke cigarettes. Throughout the shift, pt did not exhibit any aggressive behavior, denied experiencing pain or psychotic symptoms.\"     Met with patient: Patient was seen in his room in presence of PA student. He was resting in his bed, but not asleep and talked to us. He was more cooperative and not as irritable today. Immediately told us that he talked to his auntie and she convinced him to go to IRTS. Confirmed that using his Ipad to watch [employer] music videos and smoke outside was very important to him. We encouraged him to be more active in groups and he promised " to do so. Serafin immediately said that he would like to go to IRTS today or ASAP. We assured him that we would start referrals today. He denied having any MI symptoms and had no more questions or concerns. His Valproic Acid level was 72.4 today.         Medications:     Current Facility-Administered Medications   Medication Dose Route Frequency Provider Last Rate Last Admin    divalproex sodium extended-release (DEPAKOTE ER) 24 hr tablet 500 mg  500 mg Oral QAJese Dominguez MD   500 mg at 07/30/24 1134    divalproex sodium extended-release (DEPAKOTE ER) 24 hr tablet 750 mg  750 mg Oral At Bedtime Jese Arizmendi MD   750 mg at 07/29/24 1827    gabapentin (NEURONTIN) capsule 400 mg  400 mg Oral TID Jese Arizmendi MD   400 mg at 07/30/24 1137    haloperidol (HALDOL) tablet 5 mg  5 mg Oral BID Kaden Daniels APRN CNP   5 mg at 07/29/24 1825    haloperidol decanoate (HALDOL DECANOATE) injection 50 mg  50 mg Intramuscular Q28 Days Jese Arizmendi MD   50 mg at 07/22/24 1629    nicotine (NICODERM CQ) 21 MG/24HR 24 hr patch 1 patch  1 patch Transdermal Daily Jese Arizmendi MD   1 patch at 07/29/24 0852    OLANZapine zydis (zyPREXA) ODT tab 15 mg  15 mg Oral BID Jese Arizmendi MD   15 mg at 07/30/24 1135    Vitamin D3 (CHOLECALCIFEROL) tablet 50 mcg  50 mcg Oral At Bedtime Maria Pennington APRN CNP   50 mcg at 07/30/24 1135          Allergies:     Allergies   Allergen Reactions    Paliperidone Other (See Comments)     Other reaction(s): Extrapyramidal Symptoms   Significant EPS    Significant EPS    Pork Allergy Other (See Comments)     Jew prohibition          Labs:     Recent Results (from the past 24 hour(s))   Valproic acid    Collection Time: 07/30/24  8:02 AM   Result Value Ref Range    Valproic acid 72.4   ug/mL          Psychiatric Examination:     /66 (BP Location: Left arm, Patient Position: Sitting, Cuff Size: Adult Regular)   Pulse 78    "Temp (P) 97.3  F (36.3  C) (Oral)   Resp 17   Ht 1.88 m (6' 2\")   Wt 82.2 kg (181 lb 4.8 oz)   SpO2 98%   BMI 23.28 kg/m    Weight is 181 lbs 4.8 oz  Body mass index is 23.28 kg/m .    Orthostatic Vitals         Most Recent      Standing Orthostatic /80 07/29 0827    Standing Orthostatic Pulse (bpm) 96 07/29 0827           Appearance: awake, alert and dressed in hospital scrubs  Attitude: less guarded and uncooperative  Eye Contact:  partial  Mood: more cooperative and less irritable   Affect:  labile and reactive  Speech:   Sometimes pressured, sometimes slow  Psychomotor Behavior:  no evidence of tardive dyskinesia, dystonia, or tics  Thought Process: poorly organized, but improving  Associations:  no loose associations  Thought Content:  no evidence of suicidal ideation or homicidal ideation, no auditory hallucinations present, and no visual hallucinations present  Insight:  limited  Judgement:  poor  Oriented to:  time, person, and place  Attention Span and Concentration: fair  Recent and Remote Memory:  fair    Clinical Global Impressions  First: 6/4 7/23/2024      Most recent:            Precautions:     Behavioral Orders   Procedures    Assault precautions    Code 1 - Restrict to Unit    Elopement precautions    Fall precautions    Routine Programming     As clinically indicated    Status 15     Every 15 minutes.    Suicide precautions: Suicide Risk: HIGH; Clinical rationale to override score: modification to the care environment     Patients on Suicide Precautions should have a Combination Diet ordered that includes a Diet selection(s) AND a Behavioral Tray selection for Safe Tray - with utensils, or Safe Tray - NO utensils       Order Specific Question:   Suicide Risk     Answer:   HIGH     Order Specific Question:   Clinical rationale to override score:     Answer:   modification to the care environment          DIagnoses:     Schizoaffective disorder, bipolar type, current episode manic, " severe, with psychosis  Stimulant use disorder, per his disorder, per history  Noncompliance noncompliance with treatment         Plan:   Valproic Acid level 7/30/2024 was 72.4. No Changes to medications 07/30/24. First dose of Haldol Decanoate on 7/22. Will continue to encourage to consider going to IRTS, See discussion above.   - Encourage patient to participate in unit activities and spend less time in room     I was present with PA student who participated in the service and in the documentation of the note. I have verified the history and personally performed the physical exam and medical decision making. I agree with the assessment and plan of care as documented in the note.     Jese Arizmendi MD  [facility] Psychiatry    Total time spent was 35 minutes. Over 50% of times was spent counseling and coordination of care regarding coping skills, medication and discharge planning.

## 2024-07-30 NOTE — PLAN OF CARE
"  Problem: Psychotic Signs/Symptoms  Goal: Improved Behavioral Control (Psychotic Signs/Symptoms)  Outcome: Progressing  Goal: Optimal Cognitive Function (Psychotic Signs/Symptoms)  Outcome: Progressing  Intervention: Support and Promote Cognitive Ability  Recent Flowsheet Documentation  Taken 7/30/2024 1000 by Manjula Case RN  Trust Relationship/Rapport:   care explained   choices provided   emotional support provided   empathic listening provided   questions answered   questions encouraged   reassurance provided   thoughts/feelings acknowledged   Goal Outcome Evaluation:    Plan of Care Reviewed With: patient      Patient was out for breakfast briefly, returned to his room immediately after breakfast and stayed in bed/room the majority of the shift.Approached pt several times with AM medication\" come back later\" Finally took all scheduled meds at 1130 without any problem. Denies all psychiatry symptoms and denies any distress.Isolative to self/room most of the shift, briefly out in the milieu for needs.No outburts and no management concerns.Encouraged to attend groups, come out to the lounge to no avail. Safety maintained.    "

## 2024-07-31 PROCEDURE — H2032 ACTIVITY THERAPY, PER 15 MIN: HCPCS | Performed by: PSYCHOLOGIST

## 2024-07-31 PROCEDURE — 124N000002 HC R&B MH UMMC

## 2024-07-31 PROCEDURE — 250N000013 HC RX MED GY IP 250 OP 250 PS 637: Performed by: PSYCHIATRY & NEUROLOGY

## 2024-07-31 PROCEDURE — 250N000013 HC RX MED GY IP 250 OP 250 PS 637: Performed by: CLINICAL NURSE SPECIALIST

## 2024-07-31 PROCEDURE — 99232 SBSQ HOSP IP/OBS MODERATE 35: CPT | Performed by: PSYCHIATRY & NEUROLOGY

## 2024-07-31 PROCEDURE — 250N000013 HC RX MED GY IP 250 OP 250 PS 637: Performed by: NURSE PRACTITIONER

## 2024-07-31 RX ADMIN — DIVALPROEX SODIUM 750 MG: 500 TABLET, FILM COATED, EXTENDED RELEASE ORAL at 18:12

## 2024-07-31 RX ADMIN — DIVALPROEX SODIUM 500 MG: 500 TABLET, FILM COATED, EXTENDED RELEASE ORAL at 08:58

## 2024-07-31 RX ADMIN — NICOTINE POLACRILEX 4 MG: 2 LOZENGE ORAL at 19:19

## 2024-07-31 RX ADMIN — HALOPERIDOL 5 MG: 5 TABLET ORAL at 18:12

## 2024-07-31 RX ADMIN — NICOTINE POLACRILEX 4 MG: 2 LOZENGE ORAL at 14:17

## 2024-07-31 RX ADMIN — Medication 50 MCG: at 08:58

## 2024-07-31 RX ADMIN — NICOTINE 1 PATCH: 21 PATCH, EXTENDED RELEASE TRANSDERMAL at 08:58

## 2024-07-31 RX ADMIN — OLANZAPINE 15 MG: 10 TABLET, ORALLY DISINTEGRATING ORAL at 08:58

## 2024-07-31 RX ADMIN — NICOTINE POLACRILEX 4 MG: 2 LOZENGE ORAL at 18:12

## 2024-07-31 RX ADMIN — GABAPENTIN 400 MG: 400 CAPSULE ORAL at 08:58

## 2024-07-31 RX ADMIN — GABAPENTIN 400 MG: 400 CAPSULE ORAL at 18:13

## 2024-07-31 RX ADMIN — GABAPENTIN 400 MG: 400 CAPSULE ORAL at 14:18

## 2024-07-31 RX ADMIN — OLANZAPINE 15 MG: 10 TABLET, ORALLY DISINTEGRATING ORAL at 18:13

## 2024-07-31 ASSESSMENT — ACTIVITIES OF DAILY LIVING (ADL)
ADLS_ACUITY_SCORE: 40
HYGIENE/GROOMING: INDEPENDENT
ORAL_HYGIENE: INDEPENDENT
ADLS_ACUITY_SCORE: 40
DRESS: INDEPENDENT
ADLS_ACUITY_SCORE: 40
HYGIENE/GROOMING: INDEPENDENT
LAUNDRY: WITH SUPERVISION
ADLS_ACUITY_SCORE: 40
LAUNDRY: WITH SUPERVISION
ORAL_HYGIENE: INDEPENDENT
ADLS_ACUITY_SCORE: 40
ADLS_ACUITY_SCORE: 40
DRESS: INDEPENDENT
ADLS_ACUITY_SCORE: 40

## 2024-07-31 NOTE — PROGRESS NOTES
"  Rehab Group    Start time: 1700  End time: 1800  Patient time total: 30 minutes    attended partial group     #3 attended   Group Type: art   Group Topic Covered: cognitive therapy       Group Session Detail:  Pts were encouraged to choose a positive affirmation from a handout of self compassion affirmations and to create art using the chose affirmation.     Patient Response/Contribution:  cooperative with task       Patient Detail:    Pt was engaged and also at times, restless. Pt left group twice for short increments of time, did return and completed artwork. Pt found a magazine image of a map of [place] which he shared expresses his chosen positive affirmation, \"keep moving forward.\" Pt shared that he is hopeful and feeling positively about transitioning from inpatient to the next treatment center.  Pts mood was calm, pleasant participant.      Activity Therapy Per 15 min ()      Patient Active Problem List   Diagnosis    Anxiety    Adjustment disorder with anxious mood    Polysubstance abuse (H)    Manic behavior (H)    Psychosis (H)    Bipolar affective disorder, current episode manic (H)    Schizoaffective disorder, bipolar type (H)    Suicidal ideation    Agitation    Itzel (H)      "

## 2024-07-31 NOTE — PLAN OF CARE
Problem: Adult Inpatient Plan of Care  Goal: Absence of Hospital-Acquired Illness or Injury  Outcome: Progressing     Problem: Psychotic Signs/Symptoms  Goal: Improved Sleep (Psychotic Signs/Symptoms)  Outcome: Progressing   Goal Outcome Evaluation:    Pt slept through the night without distress. Pt reported no pain or discomfort. No problems with behavior. No concerns reported by pt. Pt slept 7 hours.  Staff will continue to monitor.

## 2024-07-31 NOTE — PLAN OF CARE
BEH IP Unit Acuity Rating Score (UARS)  Patient is given one point for every criteria they meet.     CRITERIA SCORING   On a 72 hour hold, court hold, committed, stay of commitment, or revocation. 1    Patient LOS on BEH unit exceeds 20 days. 1  LOS: 34    Patient under guardianship, 55+, otherwise medically complex, or under age 11. 0   Suicide ideation without relief of precipitating factors. 0   Current plan for suicide. 0   Current plan for homicide. 0   Imminent risk or actual attempt to seriously harm another without relief of factors precipitating the attempt. 0   Severe dysfunction in daily living (ex: complete neglect for self care, extreme disruption in vegetative function, extreme deterioration in social interactions). 1   Recent (last 7 days) or current physical aggression in the ED or on unit. 0   Restraints or seclusion episode in past 72 hours. 0   Recent (last 7 days) or current verbal aggression, agitation, yelling, etc., while in the ED or unit. 0      Active psychosis. 1   Need for constant or near constant redirection (from leaving, from others, etc).  0   Intrusive or disruptive behaviors. 0   Patient requires 3 or more hours of individualized nursing care per 8-hour shift (i.e. for ADLs, meds, therapeutic interventions). 1   TOTAL 5

## 2024-07-31 NOTE — PLAN OF CARE
Team Note Due:  Friday    Assessment/Intervention/Current Symtoms and Care Coordination:  Chart review and met with team, discussed pt progress, symptomology, and response to treatment.  Discussed the discharge plan and any potential impediments to discharge.      CTC met with pt and pt reports he was feeling tired. CTC encouraged him to try to attend groups when he is feeling more energetic and not isolate to his room. CTC discussed IRTS programming and pt asked if he could take a tour to see which facility he liked better. CTC explained he would not have that opportunity as when the facility reviews his pw they will determine if he is appropriate and they will give us a location since there is not 1 specified where there is an open bed. Pt asked if he could get information on the particular site if and when he is accepted.        CLIFTON for aunt.Aunt:Chela@ [phone])  Father:Said @ [phone] - aunt says to get an CLIFTON for father  Pt signed it with encouragement from Belle [place].     Discharge Plan or Goal:  IRTS       Barriers to Discharge:  Symptoms/Medication stabilization         Referral Status:  [facility] IRTS-7/31/24  [facility]-7/31/24       Medical Assistance  MA is now active.  Insurance Company Name [place]ICAID [place] (Elig Type AX: [place] Early Expansion)  PMI # [id number]  [facility]      IRTS  Pt is resisting, wants to go to aunts in [place]      Psychiatry  At one point pt said he had a psychiatrist at [facility] but he refused to give me permission to call there      Mental Health Targeted Case Management  [employer]      Legal Status:  [facility]  File Number: [id number]  Start and expiration date of commitment:   July 19, 2024 to January 17, 2025      Court  asked for DA. DA was sent 7/10/24. Waiting for case management assignment.  Neha Humphries  Senior   [facility] Behavioral Health Assessment/Intake  Office: [phone] Fax:  [phone]  [email or web address], [email or web address], [address] [address]      Contacts:    HUSSEIN Kohler  [facility] Attorney: Victoria Sammy @ [phone]    CLIFTON for aunt.Aunt:Chela@ [phone])  CLIFTON for Father:Dereck @ [phone]         Upcoming Meetings and Dates/Important Information and next steps:    Coordinate plan with aunt and father.  Will refer to IRTS when less symptomatic.    Will need psychiatry appointment.    Waiting for case management assignment by court .    Pt will need provisional discharge at the time of discharge.

## 2024-07-31 NOTE — PLAN OF CARE
"Problem: Adult Inpatient Plan of Care  Goal: Optimal Comfort and Wellbeing  Outcome: Progressing     Goal Outcome Evaluation:    Patient has been pacing the halls and in own room for most of the shift. Pt brightens on approach and has a calm mood. He is quiet and withdrawn on the unit and did not attend group this shift. Pt had a question about the process with IRTS placement. Pt was educated about this process and he was receptive. Patient requested his HS medications and later went to bed.     /79 (BP Location: Left arm)   Pulse 90   Temp 97.7  F (36.5  C) (Temporal)   Resp 17   Ht 1.88 m (6' 2\")   Wt 82.2 kg (181 lb 4.8 oz)   SpO2 99%   BMI 23.28 kg/m               "

## 2024-07-31 NOTE — PLAN OF CARE
Problem: Psychotic Signs/Symptoms  Goal: Increased Participation and Engagement (Psychotic Signs/Symptoms)  Intervention: Facilitate Participation and Engagement  Recent Flowsheet Documentation  Taken 7/31/2024 1300 by Belle Condon RN  Supportive Measures:   active listening utilized   verbalization of feelings encouraged   self-care encouraged   Goal Outcome Evaluation:    Plan of Care Reviewed With: patient          The patient is isolative and withdrawn to his rooms.  Opens his eyes spontaneously when this writer enters the patients room. The patient denies SI, SIB, HI, hallucinations, depression and anxiety.  The patient denies pain. The patient is compliant with his medications.  Appetite is good.  The patient request staff notifies him when the meal trays come.  The patient is appreciated with his cares here. The patient is encouraged to attend group.  He declined.     Continue with plan of care.

## 2024-07-31 NOTE — PROGRESS NOTES
"[facility], [place]   Psychiatric Progress Note        Interim History:   The patient's care was discussed with the treatment team during the daily team meeting and/or staff's chart notes were reviewed.  Staff report patient has been calm and isolated to room. He got 7 hours of sleep overnight and did not require any PRN medications. Patient agreed to IRTS placement yesterday. CTC reports referrals will be made today.     Met with patient: Patient was seen in his room in the presence of a nursing student. He was asleep upon our entrance but awoke as we entered. He remained in his bed with his eyes closed during the duration of our conversation. He reports his mood is \"excellent.\" He feels that his meds are working well for him. He denies SI/HI. Told him referrals are getting made for IRTS placement per our conversation yesterday. He was still in agreement with this but wanted to make sure referrals are being made \"to the best facilities.\" When asked about his involvement on the unit, he says he is mostly out of his room in the afternoon. He was encouraged to continue participation in floor activities. Patient did not have any questions or concerns for us today.          Medications:     Current Facility-Administered Medications   Medication Dose Route Frequency Provider Last Rate Last Admin    divalproex sodium extended-release (DEPAKOTE ER) 24 hr tablet 500 mg  500 mg Oral QAJese Dominguez MD   500 mg at 07/31/24 0858    divalproex sodium extended-release (DEPAKOTE ER) 24 hr tablet 750 mg  750 mg Oral At Bedtime Jese Arizmendi MD   750 mg at 07/30/24 1813    gabapentin (NEURONTIN) capsule 400 mg  400 mg Oral TID Jese Arizmendi MD   400 mg at 07/31/24 0858    haloperidol (HALDOL) tablet 5 mg  5 mg Oral BID Kaden Daniels APRN CNP   5 mg at 07/30/24 1814    haloperidol decanoate (HALDOL DECANOATE) injection 50 mg  50 mg Intramuscular Q28 Days " "Jese Arizmendi MD   50 mg at 07/22/24 1629    nicotine (NICODERM CQ) 21 MG/24HR 24 hr patch 1 patch  1 patch Transdermal Daily Jese Arizmendi MD   1 patch at 07/31/24 0858    OLANZapine zydis (zyPREXA) ODT tab 15 mg  15 mg Oral BID Jese Arizmendi MD   15 mg at 07/31/24 0858    Vitamin D3 (CHOLECALCIFEROL) tablet 50 mcg  50 mcg Oral At Bedtime Maria Pennington APRN CNP   50 mcg at 07/31/24 0858          Allergies:     Allergies   Allergen Reactions    Paliperidone Other (See Comments)     Other reaction(s): Extrapyramidal Symptoms   Significant EPS    Significant EPS    Pork Allergy Other (See Comments)     Jewish prohibition          Labs:   No results found for this or any previous visit (from the past 24 hour(s)).       Psychiatric Examination:     /79 (BP Location: Left arm)   Pulse 90   Temp 97.7  F (36.5  C) (Temporal)   Resp 17   Ht 1.88 m (6' 2\")   Wt 82.2 kg (181 lb 4.8 oz)   SpO2 99%   BMI 23.28 kg/m    Weight is 181 lbs 4.8 oz  Body mass index is 23.28 kg/m .    Orthostatic Vitals       None          Appearance: adequately groomed and wrapped in blanket, lying in bed with eyes closed  Attitude:  more cooperative  Eye Contact:   Eyes closed throughout duration of interview  Mood:  better, less manic  Affect:  mood congruent and full range  Speech:  clear, coherent  Psychomotor Behavior:  no evidence of tardive dyskinesia, dystonia, or tics  Throught Process:  linear and goal oriented  Associations:  no loose associations  Thought Content:  no evidence of suicidal ideation or homicidal ideation, no auditory hallucinations present, and no visual hallucinations present, less grandiose delusions present today  Insight:  limited  Judgement:  poor, improving  Oriented to:  time, person, and place  Attention Span and Concentration:  fair  Recent and Remote Memory:  fair    Clinical Global Impressions  First: 6/4 7/23/2024      Most recent:            Precautions: "     Behavioral Orders   Procedures    Assault precautions    Code 1 - Restrict to Unit    Elopement precautions    Fall precautions    Routine Programming     As clinically indicated    Status 15     Every 15 minutes.    Suicide precautions: Suicide Risk: HIGH; Clinical rationale to override score: modification to the care environment     Patients on Suicide Precautions should have a Combination Diet ordered that includes a Diet selection(s) AND a Behavioral Tray selection for Safe Tray - with utensils, or Safe Tray - NO utensils       Order Specific Question:   Suicide Risk     Answer:   HIGH     Order Specific Question:   Clinical rationale to override score:     Answer:   modification to the care environment          DIagnoses:     Schizoaffective disorder, bipolar type, current episode manic, severe, with psychosis  Stimulant use disorder, per his disorder, per history  Noncompliance noncompliance with treatment         Plan:   - No changes to medications 07/31/24  - IRTS referrals placed by CTC. Awaiting placement  - Encourage patient participation in floor activities  - Continue to provide structure and support     I was present with PA student who participated in the service and in the documentation of the note. I have verified the history and personally performed the physical exam and medical decision making. I agree with the assessment and plan of care as documented in the note.     Jese Arizmendi MD  [facility] Psychiatry    Total time spent was 25 minutes. Over 50% of times was spent counseling and coordination of care regarding coping skills, medication and discharge planning.

## 2024-07-31 NOTE — PLAN OF CARE
"Problem: Adult Inpatient Plan of Care  Goal: Optimal Comfort and Wellbeing  Outcome: Progressing      Goal Outcome Evaluation:    Patient has been frequently pacing the halls and in his own room this shift. Pt is bright on approach and has a calm mood. He has been social with staff on the unit. He stated that he is very excited about leaving and going to an IRTS. Pt stated \"as long as I can use my phone and smoke I could stay there forever\". Pt requested his HS medications at 1812. Pt said that he is trying to stay up later instead of going to bed so early. Patient is calm and cooperative on the unit.     /75 (BP Location: Left arm)   Pulse 109   Temp 97.5  F (36.4  C) (Oral)   Resp 17   Ht 1.88 m (6' 2\")   Wt 82.2 kg (181 lb 4.8 oz)   SpO2 100%   BMI 23.28 kg/m        "

## 2024-08-01 PROCEDURE — 99232 SBSQ HOSP IP/OBS MODERATE 35: CPT | Performed by: PSYCHIATRY & NEUROLOGY

## 2024-08-01 PROCEDURE — 124N000002 HC R&B MH UMMC

## 2024-08-01 PROCEDURE — 250N000013 HC RX MED GY IP 250 OP 250 PS 637: Performed by: PSYCHIATRY & NEUROLOGY

## 2024-08-01 PROCEDURE — 250N000013 HC RX MED GY IP 250 OP 250 PS 637: Performed by: NURSE PRACTITIONER

## 2024-08-01 PROCEDURE — 250N000013 HC RX MED GY IP 250 OP 250 PS 637: Performed by: CLINICAL NURSE SPECIALIST

## 2024-08-01 RX ADMIN — GABAPENTIN 400 MG: 400 CAPSULE ORAL at 15:43

## 2024-08-01 RX ADMIN — Medication 50 MCG: at 09:05

## 2024-08-01 RX ADMIN — DIVALPROEX SODIUM 750 MG: 500 TABLET, FILM COATED, EXTENDED RELEASE ORAL at 18:55

## 2024-08-01 RX ADMIN — OLANZAPINE 15 MG: 10 TABLET, ORALLY DISINTEGRATING ORAL at 09:05

## 2024-08-01 RX ADMIN — NICOTINE 1 PATCH: 21 PATCH, EXTENDED RELEASE TRANSDERMAL at 09:06

## 2024-08-01 RX ADMIN — DIVALPROEX SODIUM 500 MG: 500 TABLET, FILM COATED, EXTENDED RELEASE ORAL at 09:05

## 2024-08-01 RX ADMIN — HALOPERIDOL 5 MG: 5 TABLET ORAL at 18:55

## 2024-08-01 RX ADMIN — OLANZAPINE 15 MG: 10 TABLET, ORALLY DISINTEGRATING ORAL at 18:55

## 2024-08-01 RX ADMIN — NICOTINE POLACRILEX 4 MG: 2 LOZENGE ORAL at 18:29

## 2024-08-01 RX ADMIN — GABAPENTIN 400 MG: 400 CAPSULE ORAL at 18:55

## 2024-08-01 RX ADMIN — GABAPENTIN 400 MG: 400 CAPSULE ORAL at 09:05

## 2024-08-01 ASSESSMENT — ACTIVITIES OF DAILY LIVING (ADL)
ADLS_ACUITY_SCORE: 40
DRESS: INDEPENDENT
ADLS_ACUITY_SCORE: 40
DRESS: SCRUBS (BEHAVIORAL HEALTH);INDEPENDENT
ADLS_ACUITY_SCORE: 40
LAUNDRY: WITH SUPERVISION
ORAL_HYGIENE: INDEPENDENT
ADLS_ACUITY_SCORE: 40
ADLS_ACUITY_SCORE: 40
ORAL_HYGIENE: INDEPENDENT
ADLS_ACUITY_SCORE: 40
LAUNDRY: WITH SUPERVISION
ADLS_ACUITY_SCORE: 40
HYGIENE/GROOMING: INDEPENDENT
HYGIENE/GROOMING: INDEPENDENT;SHOWER

## 2024-08-01 NOTE — PLAN OF CARE
Problem: Anxiety  Goal: Anxiety Reduction or Resolution  Intervention: Promote Anxiety Reduction  Recent Flowsheet Documentation  Taken 8/1/2024 1100 by Belle Condon RN  Supportive Measures:   active listening utilized   self-care encouraged   self-responsibility promoted   verbalization of feelings encouraged   Goal Outcome Evaluation:    Plan of Care Reviewed With: patient                 The patient is dressed in scrubs.  Is observed in the lounge 50% of the shift.  The patient is calm and cooperative.  Has a full range affect.  Speech is clear and coherent.  Thoughts are organized.  The other 50% of the shift, the patient is withdrawn to his room.  The patient appetite is good.  He request to be notified when meals are here.  He is compliant with medications and nursing care.  The patient is encouraged to attend group.  He has refused.  The patient verbalized he looks forward to going to an IRT's facility.  Continue with plan of care.

## 2024-08-01 NOTE — PLAN OF CARE
Team Note Due:  Friday    Assessment/Intervention/Current Symtoms and Care Coordination:  Chart review and met with team, discussed pt progress, symptomology, and response to treatment.  Discussed the discharge plan and any potential impediments to discharge.      CTC met with pt and pt questions why he has to go to an IRTS if he has a place to go to. CTC advised the pt to talk with the provider and have his aunt call in and state that he is stable at his baseline and he could return home. Earlier in the morning pt joined a conversation with a peer yelling about not trusting the doctor. Pt was able to be redirected and went to his room. Pt reports the aunt works a lot and will not be able to call for a care conference. CTC discussed the benefits of an IRTS admission. CTC refaxed records to [employer] per Winter she received them.     CLIFTON for aunt.Aunt:Chela@ [phone])  Father:Said @ [phone] - aunt says to get an CLIFTON for father  Pt signed it with encouragement from Belle [place].     Discharge Plan or Goal:  IRTS       Barriers to Discharge:  Symptoms/Medication stabilization         Referral Status:  [facility]-7/31/24  [employer]-7/31/24       Medical Assistance  MA is now active.  Insurance [place] Name [place] (Elig Type AX: [place] Early Expansion)  PMI # [id number]  [facility]      IRTS  Pt is resisting, wants to go to aunts in [place]      Psychiatry  At one point pt said he had a psychiatrist at [facility] but he refused to give me permission to call there      Mental Health Targeted Case Management  Suha Otto [phone] - noting that she is currently out of office.   CMs Direct Supervisor is Taylor Antonio [phone]        Legal Status:  Civil commitment with [facility]  File Number: [id number]  Start and expiration date of commitment:   July 19, 2024 to January 17, 2025      Court  asked for DA. DA was sent 7/10/24. Waiting for case  management assignment.  Neha Humphries  Senior   [facility] Behavioral Health Assessment/Intake  Office: [phone] Fax: [phone]  [email or web address], [email or web address], [address]  [address] [address]      Contacts:    HUSSEIN Kohler  [facility] Attorney: Victoria Christianson @ [phone]    CLIFTON for aunt.Aunt:Chela@ [phone])  CLIFTON for Father:Dereck @ [phone]         Upcoming Meetings and Dates/Important Information and next steps:    Coordinate plan with aunt and father.  Will refer to IRTS when less symptomatic.    Will need psychiatry appointment.    Waiting for case management assignment by court .    Pt will need provisional discharge at the time of discharge.

## 2024-08-01 NOTE — PROGRESS NOTES
"[facility]   Psychiatric Progress Note        Interim History:     The patient's care was discussed with the treatment team during the daily team meeting and/or staff's chart notes were reviewed.  Staff report patient slept for about 6.25 hours last night. He was compliant with meds and went to some groups, though, was unable to stay in group for its whole duration. Voiced repeatedly desire to be discharged ASAP and go to IRTS, said that he would like to go to the \"best IRTS\". Met with CTC who tried to explain that IRTS referral would involve them interviewing him and deciding if he is a right fit for them.     Met with patient: Patient was seen in his room in the presence of PA student. Serafin was resting in his bed and talked to us. Confirmed that he would like to go to IRTS on condition that he would be allowed to smoke there and use electronics. We told him that we could refer him to such a place, explained what IRTS abbreviation was meant. As our visit progressed Serafin appeared to be becoming more frustrated and irritable, especially, when we told him that we could not assure that he could get into the best IRTS, reminded that referral and acceptance there were hinged on the availability of bed and if IRTS felt that he was a good match for them.          Medications:     Current Facility-Administered Medications   Medication Dose Route Frequency Provider Last Rate Last Admin    divalproex sodium extended-release (DEPAKOTE ER) 24 hr tablet 500 mg  500 mg Oral Jese Bingham MD   500 mg at 08/01/24 0905    divalproex sodium extended-release (DEPAKOTE ER) 24 hr tablet 750 mg  750 mg Oral At Bedtime Jese Arizmendi MD   750 mg at 07/31/24 1812    gabapentin (NEURONTIN) capsule 400 mg  400 mg Oral TID Jese Arizmendi MD   400 mg at 08/01/24 0905    haloperidol (HALDOL) tablet 5 mg  5 mg Oral BID Kaden Daniels APRN CNP   5 mg at 07/31/24 1812 " "   haloperidol decanoate (HALDOL DECANOATE) injection 50 mg  50 mg Intramuscular Q28 Days Jese Arizmendi MD   50 mg at 07/22/24 1629    nicotine (NICODERM CQ) 21 MG/24HR 24 hr patch 1 patch  1 patch Transdermal Daily Jese Arizmendi MD   1 patch at 08/01/24 0906    OLANZapine zydis (zyPREXA) ODT tab 15 mg  15 mg Oral BID Jese Arizmendi MD   15 mg at 08/01/24 0905    Vitamin D3 (CHOLECALCIFEROL) tablet 50 mcg  50 mcg Oral At Bedtime ErnestinaderrellMaria, MIGUEL CNP   50 mcg at 08/01/24 0905          Allergies:     Allergies   Allergen Reactions    Paliperidone Other (See Comments)     Other reaction(s): Extrapyramidal Symptoms   Significant EPS    Significant EPS    Pork Allergy Other (See Comments)     Buddhism prohibition          Labs:   No results found for this or any previous visit (from the past 24 hour(s)).       Psychiatric Examination:     /75 (BP Location: Left arm)   Pulse 109   Temp 98  F (36.7  C) (Oral)   Resp 17   Ht 1.88 m (6' 2\")   Wt 83.6 kg (184 lb 3.2 oz)   SpO2 100%   BMI 23.65 kg/m    Weight is 184 lbs 3.2 oz  Body mass index is 23.65 kg/m .    Orthostatic Vitals         Most Recent      Sitting Orthostatic /70 08/01 0919    Sitting Orthostatic Pulse (bpm) 93 08/01 0919    Standing Orthostatic /68 08/01 0919    Standing Orthostatic Pulse (bpm) 104 08/01 0919           Appearance: adequately groomed and wrapped in blanket, lying in bed with eyes closed  Attitude:  less cooperative today  Eye Contact:  fair  Mood: irritable, less manic  Affect:  mood congruent and full range  Speech:  clear, coherent  Psychomotor Behavior:  no evidence of tardive dyskinesia, dystonia, or tics  Throught Process:  linear and goal oriented  Associations:  no loose associations  Thought Content:  no evidence of suicidal ideation or homicidal ideation, no auditory hallucinations present, and no visual hallucinations present, less grandiose delusions present today  Insight:  " limited  Judgement:  poor, improving  Oriented to:  time, person, and place  Attention Span and Concentration:  fair  Recent and Remote Memory:  fair    Clinical Global Impressions  First: 6/4 7/23/2024      Most recent:            Precautions:     Behavioral Orders   Procedures    Assault precautions    Code 1 - Restrict to Unit    Elopement precautions    Fall precautions    Routine Programming     As clinically indicated    Status 15     Every 15 minutes.    Suicide precautions: Suicide Risk: HIGH; Clinical rationale to override score: modification to the care environment     Patients on Suicide Precautions should have a Combination Diet ordered that includes a Diet selection(s) AND a Behavioral Tray selection for Safe Tray - with utensils, or Safe Tray - NO utensils       Order Specific Question:   Suicide Risk     Answer:   HIGH     Order Specific Question:   Clinical rationale to override score:     Answer:   modification to the care environment          DIagnoses:     Schizoaffective disorder, bipolar type, current episode manic, severe, with psychosis  Stimulant use disorder, per his disorder, per history  Noncompliance noncompliance with treatment         Plan:   - No changes to medications 08/01/24  - IRTS referrals placed by CTC. Awaiting placement  - Encourage patient participation in floor activities  - Continue to provide structure and support     I was present with PA student who participated in the service and in the documentation of the note. I have verified the history and personally performed the physical exam and medical decision making. I agree with the assessment and plan of care as documented in the note.     Jese Arizmendi MD  [facility] Psychiatry    Total time spent was 25 minutes. Over 50% of times was spent counseling and coordination of care regarding coping skills, medication and discharge planning.

## 2024-08-02 PROCEDURE — 250N000013 HC RX MED GY IP 250 OP 250 PS 637: Performed by: CLINICAL NURSE SPECIALIST

## 2024-08-02 PROCEDURE — 250N000013 HC RX MED GY IP 250 OP 250 PS 637: Performed by: NURSE PRACTITIONER

## 2024-08-02 PROCEDURE — 250N000013 HC RX MED GY IP 250 OP 250 PS 637: Performed by: PSYCHIATRY & NEUROLOGY

## 2024-08-02 PROCEDURE — 99232 SBSQ HOSP IP/OBS MODERATE 35: CPT | Performed by: PSYCHIATRY & NEUROLOGY

## 2024-08-02 PROCEDURE — 124N000002 HC R&B MH UMMC

## 2024-08-02 RX ADMIN — HALOPERIDOL 5 MG: 5 TABLET ORAL at 18:33

## 2024-08-02 RX ADMIN — GABAPENTIN 400 MG: 400 CAPSULE ORAL at 08:55

## 2024-08-02 RX ADMIN — OLANZAPINE 15 MG: 10 TABLET, ORALLY DISINTEGRATING ORAL at 08:56

## 2024-08-02 RX ADMIN — DIVALPROEX SODIUM 750 MG: 500 TABLET, FILM COATED, EXTENDED RELEASE ORAL at 18:33

## 2024-08-02 RX ADMIN — DIVALPROEX SODIUM 500 MG: 500 TABLET, FILM COATED, EXTENDED RELEASE ORAL at 08:56

## 2024-08-02 RX ADMIN — NICOTINE 1 PATCH: 21 PATCH, EXTENDED RELEASE TRANSDERMAL at 08:56

## 2024-08-02 RX ADMIN — OLANZAPINE 15 MG: 10 TABLET, ORALLY DISINTEGRATING ORAL at 18:33

## 2024-08-02 RX ADMIN — Medication 50 MCG: at 08:55

## 2024-08-02 RX ADMIN — GABAPENTIN 400 MG: 400 CAPSULE ORAL at 14:22

## 2024-08-02 RX ADMIN — NICOTINE POLACRILEX 4 MG: 2 LOZENGE ORAL at 18:51

## 2024-08-02 RX ADMIN — HALOPERIDOL 5 MG: 5 TABLET ORAL at 10:00

## 2024-08-02 RX ADMIN — GABAPENTIN 400 MG: 400 CAPSULE ORAL at 18:33

## 2024-08-02 ASSESSMENT — ACTIVITIES OF DAILY LIVING (ADL)
ADLS_ACUITY_SCORE: 40
ADLS_ACUITY_SCORE: 40
LAUNDRY: WITH SUPERVISION
ADLS_ACUITY_SCORE: 40
HYGIENE/GROOMING: INDEPENDENT
ADLS_ACUITY_SCORE: 40
ORAL_HYGIENE: INDEPENDENT
ADLS_ACUITY_SCORE: 40
ADLS_ACUITY_SCORE: 40
HYGIENE/GROOMING: INDEPENDENT
ADLS_ACUITY_SCORE: 40
ORAL_HYGIENE: INDEPENDENT
DRESS: INDEPENDENT
ADLS_ACUITY_SCORE: 40
ADLS_ACUITY_SCORE: 40
LAUNDRY: WITH SUPERVISION
ADLS_ACUITY_SCORE: 40
DRESS: INDEPENDENT
ADLS_ACUITY_SCORE: 40

## 2024-08-02 NOTE — PLAN OF CARE
Goal Outcome Evaluation:    Pt spent most of the shift in and out of his room intermittently pacing the hallways and at times watching TV while listening to music on his headphones. Pt presented as restless, delusional and disorganized. Pt did not attend any group this shift even with prompts. Pt voiced anxiety regarding discharge plan on how he has been in the hospital for too long. Pt observed telling this writer on how he wants to be a big time Youtube streamer and rapper upon discharge. Pt denied SI/SIB/HI/AVH as well as anxiety and depression during assessment. Pt was medication compliant this shift and denied side effects. Pt showered this shift and appears fairly kempt. Pt reported appetite and sleep as adequate. Staff will continue to monitor and support with current POC.    Plan of Care Reviewed With: patient

## 2024-08-02 NOTE — PROGRESS NOTES
"[facility] [place]   Psychiatric Progress Note        Interim History:   The patient's care was discussed with the treatment team during the daily team meeting and/or staff's chart notes were reviewed.  Staff report patient has been restless, delusional and disorganized. He has not been attending groups and is isolated to self. Staff report patient has been talking about being a big time  and rapper upon discharge. His sleep has been appropriate, getting 7 hours last night, and not requiring any PRNs. [facility] placed referrals to IRTS on 7/31 and we are awaiting word from them.     Met with patient: Patient was seen in his room. He was seen sleeping and curled up in bed upon our arrival. Patient was able to be aroused and woke up slightly to talk to us, although he remained with his eyes closed and was resistant to turning to face us. Patient said he was doing \"good\" and was wondering if he could go to an IRTS today. We again told the patient we had referrals placed on 7/31 and we are waiting to hear from them. We encouraged him to be patient. Patient said he didn't want to talk as his \"sleep is going to fly away\" and he didn't want that. He asked us to leave. He denied questions or concerns.     Patient approached myself and PA student couple of hours after visiting with him. Said that he would like to go to a [facility] called [facility] and while waiting for a bed there, he would like to be discharged to his auntie's place: \"please, discharge me today, I can't be here one more weekend\". We reminded Serafin that he was very close to being discharged, hopefully, next week. We also reminded him that myself talked to his auntie short time ago and we both felt that it would be better to discharge him directly to IRTS, not to her. Serafin looked and sounded disappointed but didn't escalate.           Medications:     Current Facility-Administered Medications   Medication Dose Route Frequency " "Provider Last Rate Last Admin    divalproex sodium extended-release (DEPAKOTE ER) 24 hr tablet 500 mg  500 mg Oral QAM Jese Arizmendi MD   500 mg at 08/02/24 0856    divalproex sodium extended-release (DEPAKOTE ER) 24 hr tablet 750 mg  750 mg Oral At Bedtime Jese Arizmendi MD   750 mg at 08/01/24 1855    gabapentin (NEURONTIN) capsule 400 mg  400 mg Oral TID Jese Arizmendi MD   400 mg at 08/02/24 0855    haloperidol (HALDOL) tablet 5 mg  5 mg Oral BID Kaden Daniels APRN CNP   5 mg at 08/02/24 1000    haloperidol decanoate (HALDOL DECANOATE) injection 50 mg  50 mg Intramuscular Q28 Days Jese Arizmendi MD   50 mg at 07/22/24 1629    nicotine (NICODERM CQ) 21 MG/24HR 24 hr patch 1 patch  1 patch Transdermal Daily Jese Arizmendi MD   1 patch at 08/02/24 0856    OLANZapine zydis (zyPREXA) ODT tab 15 mg  15 mg Oral BID Jese Arizmendi MD   15 mg at 08/02/24 0856    Vitamin D3 (CHOLECALCIFEROL) tablet 50 mcg  50 mcg Oral At Bedtime Maria Pennington APRN CNP   50 mcg at 08/02/24 0855          Allergies:     Allergies   Allergen Reactions    Paliperidone Other (See Comments)     Other reaction(s): Extrapyramidal Symptoms   Significant EPS    Significant EPS    Pork Allergy Other (See Comments)     Mosque prohibition          Labs:   No results found for this or any previous visit (from the past 24 hour(s)).       Psychiatric Examination:     /80 (BP Location: Left arm, Patient Position: Sitting, Cuff Size: Adult Regular)   Pulse 116   Temp 98.2  F (36.8  C) (Temporal)   Resp 16   Ht 1.88 m (6' 2\")   Wt 83.6 kg (184 lb 3.2 oz)   SpO2 99%   BMI 23.65 kg/m    Weight is 184 lbs 3.2 oz  Body mass index is 23.65 kg/m .    Orthostatic Vitals         Most Recent      Sitting Orthostatic /70 08/01 0919    Sitting Orthostatic Pulse (bpm) 93 08/01 0919    Standing Orthostatic /68 08/01 0919    Standing Orthostatic Pulse (bpm) 104 08/01 0919 " "         Appearance: awake but with eyes closed and lying in bed, dressed in hospital scrubs   Attitude:  slightly uncooperative  Eye Contact:  poor , eyes closed  Mood:   \"good\", less manic, irritable  Affect:  mood congruent  Speech:  clear, coherent  Psychomotor Behavior:  no evidence of tardive dyskinesia, dystonia, or tics  Throught Process: more linear and goal oriented  Associations:  no loose associations  Thought Content:  no evidence of suicidal ideation or homicidal ideation, no auditory hallucinations present, and no visual hallucinations present  Insight:  limited  Judgement:  poor, improving  Oriented to:  time, person, and place  Attention Span and Concentration:  fair  Recent and Remote Memory:  fair    Clinical Global Impressions  First: 6/4 7/23/2024      Most recent:            Precautions:     Behavioral Orders   Procedures    Assault precautions    Code 1 - Restrict to Unit    Elopement precautions    Fall precautions    Routine Programming     As clinically indicated    Status 15     Every 15 minutes.    Suicide precautions: Suicide Risk: HIGH; Clinical rationale to override score: modification to the care environment     Patients on Suicide Precautions should have a Combination Diet ordered that includes a Diet selection(s) AND a Behavioral Tray selection for Safe Tray - with utensils, or Safe Tray - NO utensils       Order Specific Question:   Suicide Risk     Answer:   HIGH     Order Specific Question:   Clinical rationale to override score:     Answer:   modification to the care environment          DIagnoses:     Schizoaffective disorder, bipolar type, current episode manic, severe, with psychosis  Stimulant use disorder, per his disorder, per history  Noncompliance noncompliance with treatment         Plan:   - No changes to medications 08/02/24  - IRTS referrals placed by CTC on 7/31. Awaiting placement  - Encourage participation in floor activities  - Continue to provide structure and " support     I was present with PA student who participated in the service and in the documentation of the note. I have verified the history and personally performed the physical exam and medical decision making. I agree with the assessment and plan of care as documented in the note.     Jese Arizmendi MD  [facility] Psychiatry    Total time spent was 35 minutes. Over 50% of times was spent counseling and coordination of care regarding coping skills, medication and discharge planning.

## 2024-08-02 NOTE — PLAN OF CARE
"    Team Note Due:  Friday    Assessment/Intervention/Current Symtoms and Care Coordination:  Chart review and met with team, discussed pt progress, symptomology, and response to treatment.  Discussed the discharge plan and any potential impediments to discharge.    Writer received a VM from Armaan at [facility] regarding recent referral. Armaan wanted to know if pt was ready for discharge, writer affirmed that he is. Armaan asked for pt's commitment paperwork, writer faxed that to [phone].     Writer received forwarded email from [facility] who denied his referral, stating \"He will need to be off a 2:1 to be considered for IRTS. However, with how his behaviors have been since admission we also do not feel he will do well at our programs. Especially too since we are a tobacco free organization\"    Writer spoke to pt and informed him [facility] called with questions about preferred location and if he is okay with smoke free facility. Pt went back and forth about being ok with smoke free, but ultimately decided that he was open to this, he prefers the [place] location as he has family in [place]. He ultimately is open to all location as he would like to discharge as soon as possible. Pt signed CLIFTON for [facility], writer sent referral.     Discharge Plan or Goal:  IRTS       Barriers to Discharge:  Symptoms/Medication stabilization         Referral Status:  [facility] IRTS-7/31/24 [phone]  [facility]-7/31/24- declined 8/2  [facility] 8/2       Medical Assistance  MA is now active.  Insurance Company Name [place] (Elig Type AX: [place] Early Expansion)  PMI # [id number]  [facility] Targeted Case Management  Suha Otto [phone] - noting that she is currently out of office.   CMs Direct Supervisor is Taylor Antonio [phone]        Legal Status:  Civil commitment with [facility]    Kate: Dipti  File Number: [id number]  Start " and expiration date of commitment:   July 19, 2024 to January 17, 2025      Court  asked for DA. DA was sent 7/10/24. Waiting for case management assignment.  Neha Humphries  Senior   [facility] Behavioral Health Assessment/Intake  Office: [phone] Fax: [phone]  [email or web address], [address] [place] [address]      Contacts:    HUSSEIN Kohler  [facility] Attorney: Victoria Christianson @ [phone]    CLIFTON for aunt.Aunt:Chela@ [phone])  CLIFTON for Father:Said @ [phone]         Upcoming Meetings and Dates/Important Information and next steps:  Coordinate plan with aunt and father.    Will need psychiatry appointment.    Waiting for case management assignment by court .    Pt will need provisional discharge at the time of discharge.

## 2024-08-02 NOTE — PLAN OF CARE
BEH IP Unit Acuity Rating Score (UARS)  Patient is given one point for every criteria they meet.     CRITERIA SCORING   On a 72 hour hold, court hold, committed, stay of commitment, or revocation. 1    Patient LOS on BEH unit exceeds 20 days. 1  LOS: 36    Patient under guardianship, 55+, otherwise medically complex, or under age 11. 0   Suicide ideation without relief of precipitating factors. 0   Current plan for suicide. 0   Current plan for homicide. 0   Imminent risk or actual attempt to seriously harm another without relief of factors precipitating the attempt. 0   Severe dysfunction in daily living (ex: complete neglect for self care, extreme disruption in vegetative function, extreme deterioration in social interactions). 1   Recent (last 7 days) or current physical aggression in the ED or on unit. 0   Restraints or seclusion episode in past 72 hours. 0   Recent (last 7 days) or current verbal aggression, agitation, yelling, etc., while in the ED or unit. Last 8/1/24 1      Active psychosis. 1   Need for constant or near constant redirection (from leaving, from others, etc).  0   Intrusive or disruptive behaviors. 0   Patient requires 3 or more hours of individualized nursing care per 8-hour shift (i.e. for ADLs, meds, therapeutic interventions). 1   TOTAL 6

## 2024-08-02 NOTE — PLAN OF CARE
"  Problem: Adult Behavioral Health Plan of Care  Goal: Plan of Care Review  Recent Flowsheet Documentation  Taken 8/2/2024 1300 by Belle Condon RN  Patient Agreement with Plan of Care: agrees   Goal Outcome Evaluation:    Plan of Care Reviewed With: patient                 Goal for this shift: To attend group.  The patient did not meet this goal.       Presentation: The patient is isolative and withdrawn to his room.  He is observed in the lounge briefly for meals.  He is pleasant, calm, and cooperative this shift.  His affect is full ranged.  Speech is clear and coherent.  Thoughts are relevant.  The patient is hopeful to discharge to an [facility]'s soon.        Mental health symptoms: The patient denies SI, SIB, HI, hallucinations, depression and anxiety.  The patient is behaviorally appropriated.  Encouraged to go to groups.  The patient declines.       Medication compliance: The patient is compliant with all of his medications.       Medical Concerns: The patient denies pain.  Denies medication side effects.  Has not verbalized any medical concerns.  Blood pressure 110/80, pulse 116, temperature 98.2  F (36.8  C), temperature source Temporal, resp. rate 16, height 1.88 m (6' 2\"), weight 83.6 kg (184 lb 3.2 oz), SpO2 99%.      PRN's: none      Precautions: suicide, fall, assault, and elopement    Continue with plan of care        "

## 2024-08-03 PROCEDURE — 250N000013 HC RX MED GY IP 250 OP 250 PS 637: Performed by: NURSE PRACTITIONER

## 2024-08-03 PROCEDURE — 250N000013 HC RX MED GY IP 250 OP 250 PS 637: Performed by: PSYCHIATRY & NEUROLOGY

## 2024-08-03 PROCEDURE — 124N000002 HC R&B MH UMMC

## 2024-08-03 PROCEDURE — 250N000013 HC RX MED GY IP 250 OP 250 PS 637: Performed by: CLINICAL NURSE SPECIALIST

## 2024-08-03 RX ADMIN — GABAPENTIN 400 MG: 400 CAPSULE ORAL at 09:10

## 2024-08-03 RX ADMIN — DIVALPROEX SODIUM 500 MG: 500 TABLET, FILM COATED, EXTENDED RELEASE ORAL at 09:10

## 2024-08-03 RX ADMIN — OLANZAPINE 15 MG: 10 TABLET, ORALLY DISINTEGRATING ORAL at 18:32

## 2024-08-03 RX ADMIN — HALOPERIDOL 5 MG: 5 TABLET ORAL at 09:10

## 2024-08-03 RX ADMIN — NICOTINE POLACRILEX 4 MG: 2 LOZENGE ORAL at 19:27

## 2024-08-03 RX ADMIN — HALOPERIDOL 5 MG: 5 TABLET ORAL at 18:32

## 2024-08-03 RX ADMIN — NICOTINE 1 PATCH: 21 PATCH, EXTENDED RELEASE TRANSDERMAL at 09:11

## 2024-08-03 RX ADMIN — GABAPENTIN 400 MG: 400 CAPSULE ORAL at 13:52

## 2024-08-03 RX ADMIN — OLANZAPINE 15 MG: 10 TABLET, ORALLY DISINTEGRATING ORAL at 09:10

## 2024-08-03 RX ADMIN — DIVALPROEX SODIUM 750 MG: 500 TABLET, FILM COATED, EXTENDED RELEASE ORAL at 18:32

## 2024-08-03 RX ADMIN — Medication 50 MCG: at 09:10

## 2024-08-03 RX ADMIN — GABAPENTIN 400 MG: 400 CAPSULE ORAL at 18:32

## 2024-08-03 ASSESSMENT — ACTIVITIES OF DAILY LIVING (ADL)
ADLS_ACUITY_SCORE: 40
ADLS_ACUITY_SCORE: 40
DRESS: INDEPENDENT
HYGIENE/GROOMING: INDEPENDENT
ORAL_HYGIENE: INDEPENDENT
ADLS_ACUITY_SCORE: 40
LAUNDRY: WITH SUPERVISION
ADLS_ACUITY_SCORE: 40
DRESS: INDEPENDENT
ADLS_ACUITY_SCORE: 40
LAUNDRY: WITH SUPERVISION
ADLS_ACUITY_SCORE: 40
HYGIENE/GROOMING: INDEPENDENT
ADLS_ACUITY_SCORE: 40
ORAL_HYGIENE: INDEPENDENT
ADLS_ACUITY_SCORE: 40

## 2024-08-03 NOTE — PLAN OF CARE
"Goal Outcome Evaluation:    Pt spent 50% of the shift in the [facility] area watching TV and listening to music on his headphones. Pt presented as restless, delusional and disorganized. Pt did not attend any group this shift even with prompts.. Pt denied SI/SIB/HI/AVH as well as anxiety and depression during assessment. Pt stated  \" At this point I am just going to wait and let the system decide my discharge hopefully to [facility] next week.\" Pt was medication compliant this shift and denied side effects. Pt showered this shift and appears fairly kempt. Pt reported appetite and sleep as adequate. Staff will continue to monitor and support with current POC.     Plan of Care Reviewed With: patient          "

## 2024-08-03 NOTE — PLAN OF CARE
Night Nursing Note   8/2/24 - 8/3/24        Lamine was in bed at beginning of shift and appeared asleep.  Monitored q15 mins for safety.  Appeared to sleep majority of night without difficulty.  Continue to monitor, offer support and reassurance per care plan.    Slept 6.75 hrs.

## 2024-08-03 NOTE — PLAN OF CARE
Problem: Psychotic Signs/Symptoms  Goal: Improved Behavioral Control (Psychotic Signs/Symptoms)  Outcome: Progressing    Pt has been in his room all day except for meals. Says it's boring and he just wants to rest. Denies depression, anxiety, any hallucinations nor SI/SIB/HI. Denies any negative physical symptoms. Took med's without incident. Pleasant and cooperative. He's just waiting for discharge. No unusual comments or behaviors.

## 2024-08-04 PROCEDURE — 124N000002 HC R&B MH UMMC

## 2024-08-04 PROCEDURE — 250N000013 HC RX MED GY IP 250 OP 250 PS 637: Performed by: NURSE PRACTITIONER

## 2024-08-04 PROCEDURE — 250N000013 HC RX MED GY IP 250 OP 250 PS 637: Performed by: CLINICAL NURSE SPECIALIST

## 2024-08-04 PROCEDURE — 250N000013 HC RX MED GY IP 250 OP 250 PS 637: Performed by: PSYCHIATRY & NEUROLOGY

## 2024-08-04 RX ADMIN — GABAPENTIN 400 MG: 400 CAPSULE ORAL at 14:21

## 2024-08-04 RX ADMIN — HALOPERIDOL 5 MG: 5 TABLET ORAL at 08:30

## 2024-08-04 RX ADMIN — HALOPERIDOL 5 MG: 5 TABLET ORAL at 18:49

## 2024-08-04 RX ADMIN — Medication 50 MCG: at 08:30

## 2024-08-04 RX ADMIN — GABAPENTIN 400 MG: 400 CAPSULE ORAL at 08:30

## 2024-08-04 RX ADMIN — GABAPENTIN 400 MG: 400 CAPSULE ORAL at 18:49

## 2024-08-04 RX ADMIN — NICOTINE POLACRILEX 4 MG: 2 LOZENGE ORAL at 20:05

## 2024-08-04 RX ADMIN — DIVALPROEX SODIUM 750 MG: 500 TABLET, FILM COATED, EXTENDED RELEASE ORAL at 18:49

## 2024-08-04 RX ADMIN — OLANZAPINE 15 MG: 10 TABLET, ORALLY DISINTEGRATING ORAL at 18:49

## 2024-08-04 RX ADMIN — NICOTINE 1 PATCH: 21 PATCH, EXTENDED RELEASE TRANSDERMAL at 08:30

## 2024-08-04 RX ADMIN — OLANZAPINE 15 MG: 10 TABLET, ORALLY DISINTEGRATING ORAL at 08:30

## 2024-08-04 RX ADMIN — DIVALPROEX SODIUM 500 MG: 500 TABLET, FILM COATED, EXTENDED RELEASE ORAL at 08:29

## 2024-08-04 ASSESSMENT — ACTIVITIES OF DAILY LIVING (ADL)
ADLS_ACUITY_SCORE: 40
LAUNDRY: WITH SUPERVISION
DRESS: INDEPENDENT
ADLS_ACUITY_SCORE: 40
DRESS: INDEPENDENT
ADLS_ACUITY_SCORE: 40
ORAL_HYGIENE: INDEPENDENT
LAUNDRY: WITH SUPERVISION
ADLS_ACUITY_SCORE: 40
ORAL_HYGIENE: INDEPENDENT
ADLS_ACUITY_SCORE: 40
HYGIENE/GROOMING: INDEPENDENT
ADLS_ACUITY_SCORE: 40
HYGIENE/GROOMING: INDEPENDENT

## 2024-08-04 NOTE — PLAN OF CARE
Goal Outcome Evaluation:      Group Attendance:  attended partial group    Time session began: 5:15 pm  Time session ended: 5:55 pm  Patient's total time in group: 40    Total # Attendees   6   Group Type psychotherapeutic     Group Topic Covered insight improvement and CBT skills Cognitive distortions     Group Session Detail Process/ art cognitive distortions     Patient's response to the group topic/interactions:  positive affect, listened actively, and attentive     Patient Details: Pt came in late, said he had just woken up and wanted to participate. He was in group the last 10 minutes. He enjoyed the view and listening to peers present their artwork.           No Charge (0-15 min)    Patient Active Problem List   Diagnosis    Anxiety    Adjustment disorder with anxious mood    Polysubstance abuse (H)    Manic behavior (H)    Psychosis (H)    Bipolar affective disorder, current episode manic (H)    Schizoaffective disorder, bipolar type (H)    Suicidal ideation    Agitation    Itzel (H)

## 2024-08-04 NOTE — PLAN OF CARE
"Goal Outcome Evaluation:    Pt spent most of the shift in the [facility] area watching TV and listening to music on his headphones. Pt presented as restless, delusional and disorganized with his thoughts. Pt denied SI/SIB/HI/AVH as well as anxiety and depression during assessment. Pt approached this writer inquiring on when he will discharge and asking if he can discharge this weekend since he has been accepted to an IRTS. Pt reported \" I feel like I am getting depressed from the long wait with the discharge planning.\" Writer encouraged pt to check in with the care team on Monday and was receptive with the plan. Pt was medication compliant this shift and denied side effects. Pt appears appears fairly kempt. Pt reported appetite and sleep as adequate. Staff will continue to monitor and support with current POC.     PRN nicotine gums given this shift.      Plan of Care Reviewed With: patient          "

## 2024-08-04 NOTE — PLAN OF CARE
Night Nursing Note   8/3/24 - 8/4/24        Lamine was in bed at beginning of shift and appeared asleep.  Monitored q15 mins for safety.  Appeared to sleep majority of night without difficulty.  Continue to monitor, offer support and reassurance per care plan.    Slept 7 hrs.

## 2024-08-04 NOTE — PLAN OF CARE
Problem: Psychotic Signs/Symptoms  Goal: Optimal Cognitive Function (Psychotic Signs/Symptoms)  Outcome: Progressing     Like yesterday, pt in his room all day, except for a little TV after lunch. Says he's bored. Denies SI/SIB/HI, hallucinations, depression and anxiety. Pleasant and cooperative at all times. No unusual comments or behaviors. Denies all negative physical symptoms. Positive and excited about possible discharge soon.

## 2024-08-05 PROCEDURE — 250N000013 HC RX MED GY IP 250 OP 250 PS 637: Performed by: PSYCHIATRY & NEUROLOGY

## 2024-08-05 PROCEDURE — 99232 SBSQ HOSP IP/OBS MODERATE 35: CPT | Performed by: PSYCHIATRY & NEUROLOGY

## 2024-08-05 PROCEDURE — 124N000002 HC R&B MH UMMC

## 2024-08-05 PROCEDURE — 250N000013 HC RX MED GY IP 250 OP 250 PS 637: Performed by: CLINICAL NURSE SPECIALIST

## 2024-08-05 PROCEDURE — 250N000013 HC RX MED GY IP 250 OP 250 PS 637: Performed by: NURSE PRACTITIONER

## 2024-08-05 RX ADMIN — NICOTINE 1 PATCH: 21 PATCH, EXTENDED RELEASE TRANSDERMAL at 09:30

## 2024-08-05 RX ADMIN — GABAPENTIN 400 MG: 400 CAPSULE ORAL at 18:03

## 2024-08-05 RX ADMIN — HALOPERIDOL 5 MG: 5 TABLET ORAL at 09:28

## 2024-08-05 RX ADMIN — DIVALPROEX SODIUM 500 MG: 500 TABLET, FILM COATED, EXTENDED RELEASE ORAL at 09:29

## 2024-08-05 RX ADMIN — OLANZAPINE 15 MG: 10 TABLET, ORALLY DISINTEGRATING ORAL at 09:28

## 2024-08-05 RX ADMIN — OLANZAPINE 15 MG: 10 TABLET, ORALLY DISINTEGRATING ORAL at 18:03

## 2024-08-05 RX ADMIN — NICOTINE POLACRILEX 4 MG: 2 LOZENGE ORAL at 18:08

## 2024-08-05 RX ADMIN — GABAPENTIN 400 MG: 400 CAPSULE ORAL at 13:06

## 2024-08-05 RX ADMIN — DIVALPROEX SODIUM 750 MG: 500 TABLET, FILM COATED, EXTENDED RELEASE ORAL at 18:03

## 2024-08-05 RX ADMIN — GABAPENTIN 400 MG: 400 CAPSULE ORAL at 09:28

## 2024-08-05 RX ADMIN — Medication 50 MCG: at 09:29

## 2024-08-05 RX ADMIN — HALOPERIDOL 5 MG: 5 TABLET ORAL at 18:04

## 2024-08-05 ASSESSMENT — ACTIVITIES OF DAILY LIVING (ADL)
ADLS_ACUITY_SCORE: 40

## 2024-08-05 NOTE — PLAN OF CARE
BEH IP Unit Acuity Rating Score (UARS)  Patient is given one point for every criteria they meet.     CRITERIA SCORING   On a 72 hour hold, court hold, committed, stay of commitment, or revocation. 1    Patient LOS on BEH unit exceeds 20 days. 1  LOS: 39    Patient under guardianship, 55+, otherwise medically complex, or under age 11. 0   Suicide ideation without relief of precipitating factors. 0   Current plan for suicide. 0   Current plan for homicide. 0   Imminent risk or actual attempt to seriously harm another without relief of factors precipitating the attempt. 0   Severe dysfunction in daily living (ex: complete neglect for self care, extreme disruption in vegetative function, extreme deterioration in social interactions). 1   Recent (last 7 days) or current physical aggression in the ED or on unit. 0   Restraints or seclusion episode in past 72 hours. 0   Recent (last 7 days) or current verbal aggression, agitation, yelling, etc., while in the ED or unit. Last 8/1/24 1      Active psychosis. 1   Need for constant or near constant redirection (from leaving, from others, etc).  0   Intrusive or disruptive behaviors. 0   Patient requires 3 or more hours of individualized nursing care per 8-hour shift (i.e. for ADLs, meds, therapeutic interventions). 1   TOTAL 6

## 2024-08-05 NOTE — PROGRESS NOTES
"[facility], [place]   Psychiatric Progress Note        Interim History:   The patient's care was discussed with the treatment team during the daily team meeting and/or staff's chart notes were reviewed.  Staff report patient slept for 6 hours last night. He was overall, cooperative with meds and care, had limited interactions with staff and peers and appeared to be sad, went to some groups, but didn't stay there for a long time. Per [facility], he was declined by [facility] According to weekend report pt approached one of nurses and asked about discharge, said that he was tired from being hospitalized for such a long time, see RN's note below:    \"Pt spent most of the shift in the lounge area watching TV and listening to music on his headphones. Pt presented as restless and disorganized with his thoughts. Pt denied SI/SIB/HI/AVH as well as anxiety and depression during assessment. Pt approached this writer inquiring on when he will discharge was observed stating I am sick and tired with this place, I hope I am discharging for sure this week.\" Writer encouraged pt to check in with the care team on Monday and was receptive with the plan. Pt was medication compliant this shift and denied side effects. Pt appears appears fairly kempt. Pt reported appetite and sleep as adequate. Staff will continue to monitor and support with current POC.      PRN nicotine gums given this shift.\"    Met with patient: Patient was seen in his room. He was laying with closed eyes and clearly was not too interested in meeting with us. Confirmed that he would like to be discharged from this hospital ASAP, blamed us for \"playing with his life\". We informed Serafin that [facility] declined him, told him that we would continue to work on his placement and encouraged him to be more cooperative, go to more groups, suggested to talk to [facility] in a few hours to get updates. He nodded and had no more questions for us.          " "Medications:     Current Facility-Administered Medications   Medication Dose Route Frequency Provider Last Rate Last Admin    divalproex sodium extended-release (DEPAKOTE ER) 24 hr tablet 500 mg  500 mg Oral QAM Jese Arizmendi MD   500 mg at 08/05/24 0929    divalproex sodium extended-release (DEPAKOTE ER) 24 hr tablet 750 mg  750 mg Oral At Bedtime Jese Arizmendi MD   750 mg at 08/04/24 1849    gabapentin (NEURONTIN) capsule 400 mg  400 mg Oral TID Jese Arizmendi MD   400 mg at 08/05/24 1306    haloperidol (HALDOL) tablet 5 mg  5 mg Oral BID Kaden Daniels APRN CNP   5 mg at 08/05/24 0928    haloperidol decanoate (HALDOL DECANOATE) injection 50 mg  50 mg Intramuscular Q28 Days Jese Arizmendi MD   50 mg at 07/22/24 1629    nicotine (NICODERM CQ) 21 MG/24HR 24 hr patch 1 patch  1 patch Transdermal Daily Jese Arizmendi MD   1 patch at 08/05/24 0930    OLANZapine zydis (zyPREXA) ODT tab 15 mg  15 mg Oral BID Jese Arizmendi MD   15 mg at 08/05/24 0928    Vitamin D3 (CHOLECALCIFEROL) tablet 50 mcg  50 mcg Oral At Bedtime Maria Pennington APRN CNP   50 mcg at 08/05/24 0929          Allergies:     Allergies   Allergen Reactions    Paliperidone Other (See Comments)     Other reaction(s): Extrapyramidal Symptoms   Significant EPS    Significant EPS    Pork Allergy Other (See Comments)     Christian prohibition          Labs:   No results found for this or any previous visit (from the past 24 hour(s)).       Psychiatric Examination:     /76 (BP Location: Right arm)   Pulse 93   Temp 97.1  F (36.2  C) (Temporal)   Resp 16   Ht 1.88 m (6' 2\")   Wt 83.2 kg (183 lb 6.4 oz)   SpO2 98%   BMI 23.55 kg/m    Weight is 183 lbs 6.4 oz  Body mass index is 23.55 kg/m .    Orthostatic Vitals         Most Recent      Sitting Orthostatic /82 08/04 0841    Sitting Orthostatic Pulse (bpm) 94 08/04 0841    Standing Orthostatic /78 08/05 0850    Standing " "Orthostatic Pulse (bpm) 103 08/05 0850           Appearance: awake but with eyes closed and lying in bed, dressed in hospital scrubs   Attitude:  slightly uncooperative  Eye Contact:  poor , eyes closed  Mood:   \"OK\", less manic, irritable  Affect:  mood congruent  Speech:  clear, coherent, decreased volume  Psychomotor Behavior:  no evidence of tardive dyskinesia, dystonia, or tics  Throught Process: more linear and goal oriented  Associations:  no loose associations  Thought Content:  no evidence of suicidal ideation or homicidal ideation, no auditory hallucinations present, and no visual hallucinations present  Insight:  limited  Judgement:  poor, improving  Oriented to:  time, person, and place  Attention Span and Concentration:  fair  Recent and Remote Memory:  fair    Clinical Global Impressions  First: 6/4 7/23/2024      Most recent:            Precautions:     Behavioral Orders   Procedures    Assault precautions    Code 1 - Restrict to Unit    Elopement precautions    Fall precautions    Routine Programming     As clinically indicated    Status 15     Every 15 minutes.    Suicide precautions: Suicide Risk: HIGH; Clinical rationale to override score: modification to the care environment     Patients on Suicide Precautions should have a Combination Diet ordered that includes a Diet selection(s) AND a Behavioral Tray selection for Safe Tray - with utensils, or Safe Tray - NO utensils       Order Specific Question:   Suicide Risk     Answer:   HIGH     Order Specific Question:   Clinical rationale to override score:     Answer:   modification to the care environment          DIagnoses:     Schizoaffective disorder, bipolar type, current episode manic, severe, with psychosis, improved.  Stimulant use disorder, per his disorder, per history  Noncompliance with treatment         Plan:   - No changes to medications 08/05/24  - IRTS referrals placed by [facility] on 7/31. Declined by [facility] Awaiting placement  - " Encourage participation in floor activities  - Continue to provide structure and support     I was present with PA student who participated in the service and in the documentation of the note. I have verified the history and personally performed the physical exam and medical decision making. I agree with the assessment and plan of care as documented in the note.     Jese Arizmendi MD  [facility] Psychiatry    Total time spent was 25 minutes. Over 50% of times was spent counseling and coordination of care regarding coping skills, medication and discharge planning.

## 2024-08-05 NOTE — PLAN OF CARE
Problem: Manic or Hypomanic Signs/Symptoms  Goal: Improved Impulse Control (Manic/Hypomanic Signs/Symptoms)  Outcome: Progressing   Goal Outcome Evaluation:    Plan of Care Reviewed With: patient        Pt was isolative and withdrawn. Spent most of the shift in his room. Seen in the milieu only at breakfast and lunch. Affect is flat. Mood is sad. Denies anxiety, depression, SI,HI SIB, and AVH. Pt is medication compliant. Ate all his breakfast and lunch. No behavioral issue noted.     Plan: Continue to provide safe environment and therapeutic milieu.    Vital signs:  Temp: 97.1  F (36.2  C) Temp src: Temporal BP: 109/76 Pulse: 93     SpO2: 98 % O2 Device: None (Room air)

## 2024-08-05 NOTE — PLAN OF CARE
Night Nursing Note  8/4/24 - 8/5/24           Penelope was in bed at beginning of shift and appeared asleep.  Monitored q15 mins for safety.  Appeared to sleep majority of night without difficulty.  Continue to monitor, offer support and reassurance per care plan.    Slept 6 hrs.

## 2024-08-05 NOTE — PLAN OF CARE
"Goal Outcome Evaluation:    Pt spent most of the shift in the [facility] area watching TV and listening to music on his headphones. Pt presented as restless and disorganized with his thoughts. Pt denied SI/SIB/HI/AVH as well as anxiety and depression during assessment. Pt approached this writer inquiring on when he will discharge was observed stating I am sick and tired with this place, I hope I am discharging for sure this week.\" Writer encouraged pt to check in with the care team on Monday and was receptive with the plan. Pt was medication compliant this shift and denied side effects. Pt appears appears fairly kempt. Pt reported appetite and sleep as adequate. Staff will continue to monitor and support with current POC.      PRN nicotine gums given this shift.       Plan of Care Reviewed With: patient        "

## 2024-08-06 PROCEDURE — 99232 SBSQ HOSP IP/OBS MODERATE 35: CPT | Performed by: PSYCHIATRY & NEUROLOGY

## 2024-08-06 PROCEDURE — 124N000002 HC R&B MH UMMC

## 2024-08-06 PROCEDURE — 250N000013 HC RX MED GY IP 250 OP 250 PS 637: Performed by: NURSE PRACTITIONER

## 2024-08-06 PROCEDURE — 250N000013 HC RX MED GY IP 250 OP 250 PS 637: Performed by: PSYCHIATRY & NEUROLOGY

## 2024-08-06 PROCEDURE — 250N000013 HC RX MED GY IP 250 OP 250 PS 637: Performed by: CLINICAL NURSE SPECIALIST

## 2024-08-06 RX ORDER — OLANZAPINE 10 MG/1
10 TABLET ORAL 2 TIMES DAILY
Status: DISCONTINUED | OUTPATIENT
Start: 2024-08-06 | End: 2024-08-09 | Stop reason: HOSPADM

## 2024-08-06 RX ADMIN — DIVALPROEX SODIUM 500 MG: 500 TABLET, FILM COATED, EXTENDED RELEASE ORAL at 08:40

## 2024-08-06 RX ADMIN — GABAPENTIN 400 MG: 400 CAPSULE ORAL at 13:02

## 2024-08-06 RX ADMIN — NICOTINE 1 PATCH: 21 PATCH, EXTENDED RELEASE TRANSDERMAL at 08:41

## 2024-08-06 RX ADMIN — Medication 50 MCG: at 08:41

## 2024-08-06 RX ADMIN — HALOPERIDOL 5 MG: 5 TABLET ORAL at 08:40

## 2024-08-06 RX ADMIN — OLANZAPINE 10 MG: 10 TABLET, FILM COATED ORAL at 20:27

## 2024-08-06 RX ADMIN — GABAPENTIN 400 MG: 400 CAPSULE ORAL at 08:41

## 2024-08-06 RX ADMIN — DIVALPROEX SODIUM 750 MG: 500 TABLET, FILM COATED, EXTENDED RELEASE ORAL at 18:30

## 2024-08-06 RX ADMIN — HALOPERIDOL 5 MG: 5 TABLET ORAL at 18:30

## 2024-08-06 RX ADMIN — OLANZAPINE 15 MG: 10 TABLET, ORALLY DISINTEGRATING ORAL at 08:41

## 2024-08-06 RX ADMIN — GABAPENTIN 400 MG: 400 CAPSULE ORAL at 18:31

## 2024-08-06 ASSESSMENT — ACTIVITIES OF DAILY LIVING (ADL)
DRESS: INDEPENDENT
ADLS_ACUITY_SCORE: 40
ADLS_ACUITY_SCORE: 41
ADLS_ACUITY_SCORE: 41
ADLS_ACUITY_SCORE: 40
ADLS_ACUITY_SCORE: 40
HYGIENE/GROOMING: INDEPENDENT
ADLS_ACUITY_SCORE: 40
ADLS_ACUITY_SCORE: 41
ADLS_ACUITY_SCORE: 41
ADLS_ACUITY_SCORE: 40
DRESS: INDEPENDENT
ADLS_ACUITY_SCORE: 40
ADLS_ACUITY_SCORE: 41
ADLS_ACUITY_SCORE: 40
HYGIENE/GROOMING: INDEPENDENT
ORAL_HYGIENE: INDEPENDENT
ADLS_ACUITY_SCORE: 40
LAUNDRY: UNABLE TO COMPLETE
LAUNDRY: WITH SUPERVISION
ADLS_ACUITY_SCORE: 41
ORAL_HYGIENE: INDEPENDENT
ADLS_ACUITY_SCORE: 41
ADLS_ACUITY_SCORE: 40
ADLS_ACUITY_SCORE: 40
ADLS_ACUITY_SCORE: 41
ADLS_ACUITY_SCORE: 40

## 2024-08-06 NOTE — PLAN OF CARE
Problem: Adult Behavioral Health Plan of Care  Goal: Plan of Care Review  Outcome: Progressing  Flowsheets (Taken 8/5/2024 1717)  Patient Agreement with Plan of Care: agrees   Goal Outcome Evaluation:    Plan of Care Reviewed With: patient    Pt is calm , Isolative na withdrawn . Pt out in the milieu for meals and medication . Pt believe his medication are working for him . Pt denied all MH Sx . Will continue POC

## 2024-08-06 NOTE — PLAN OF CARE
"  Problem: Adult Inpatient Plan of Care  Goal: Absence of Hospital-Acquired Illness or Injury  Intervention: Identify and Manage Fall Risk  Recent Flowsheet Documentation  Taken 8/6/2024 0800 by Casalenda, Gina R, RN  Safety Promotion/Fall Prevention: safety round/check completed     Problem: Adult Behavioral Health Plan of Care  Goal: Adheres to Safety Considerations for Self and Others  Outcome: Progressing  Flowsheets (Taken 8/6/2024 1344)  Adheres to Safety Considerations for Self and Others: making progress toward outcome     Pt denies anxiety and depression. Pt denies SI/SIB/HI/AH/VH. Pt contracts for safety. Pt reports mild right leg pain - scheduled medication given. Pt reports scheduled medication effective and currently denies pain at this time. VSS.    Pt presents with a blunted affect. Pt does brighten a little on approach. Eye contact observed to be appropriate. Speech observed to be clear and coherent. Pt observed to be calm and cooperative. Pt observed to be pleasant and polite. Pt observed to be isolative and withdrawn. Pt appears unkempt but dressed appropriately for  weather. Pt politely declined to shower at this time.    Pt visible on unit attending meals and occasionally watching television and listening to headphones; however, pt spent majority of the day listening to headphones and resting in bed. Pt did not attend group this shift. Pt keeps to himself while out on the unit.     Pt continues to voice frustration about being hospitalized and reports he would like to discharge. Pt initially reported he did not want to meet with psychiatrist; however, when writer informed pt it is in his best interest to check in with provider, even if it is just briefly each day, pt stated, \"okay, then I will meet with him.\" Pt continues to make statements about wanting to discharge and reports sleeping helps to pass the time.     Pt is medication compliant. He denies any medication SE at this time. He denies " "any additional questions or concerns at this time. He continues to have a no roommate order. He remains on assault, elopement, fall, and suicide precautions - no assault or suicidal behaviors noted this shift. No elopement attempts noted this shift. No falls noted this shift. Will continue to monitor and assist as needed.    /78 (BP Location: Left arm)   Pulse 90   Temp 97.5  F (36.4  C) (Temporal)   Resp 16   Ht 1.88 m (6' 2\")   Wt 83.2 kg (183 lb 6.4 oz)   SpO2 100%   BMI 23.55 kg/m      "

## 2024-08-06 NOTE — PLAN OF CARE
Goal Outcome Evaluation:    Patient asleep at start of the shift. Breathing quiet and unlabored when sleeping. Pt had no c/o pain or discomfort during the HS. No PRN's given or requested. Appears to have slept 7 hours.

## 2024-08-06 NOTE — PROGRESS NOTES
"[facility], [place]   Psychiatric Progress Note        Interim History:   The patient's care was discussed with the treatment team during the daily team meeting and/or staff's chart notes were reviewed.  Staff report patient slept for 7 hours last night. He was cooperative with meds and care, in interactions with staff was mostly focused on discharge, appeared to be less agitated and more cooperative, see RN's note below:    \"Pt is calm , Isolative na withdrawn . Pt out in the milieu for meals and medication . Pt believe his medication are working for him . Pt denied all MH Sx .\"    Met with patient: Patient was seen in his room in presence of PA student. He again asked about discharge. We reminded him that [facility] would not take him because he wanted to go to a facility that would allow smoking outside and [facility] is not one of them. Serafin asked about [facility]. We informed him that [facility] would talk to [facility], encouraged him to be more open and flexible with referrals to other places. He said he would. Asked to put him on Gabapentin and Lamictal? Said that he didn't need the rest of his meds? We reminded him that he would still need to get his oral Haldol until Haldol Decanoate starts working, promised to check on Gabapentin. Recommended him that with being on Depakote, Zyprexa, Haldol, he didn't need addition to Lamictal. Serafin didn't argue and had no more questions or concerns.         Medications:     Current Facility-Administered Medications   Medication Dose Route Frequency Provider Last Rate Last Admin    divalproex sodium extended-release (DEPAKOTE ER) 24 hr tablet 500 mg  500 mg Oral Jese Bingham MD   500 mg at 08/06/24 0840    divalproex sodium extended-release (DEPAKOTE ER) 24 hr tablet 750 mg  750 mg Oral At Bedtime Jese Arizmendi MD   750 mg at 08/05/24 1803    gabapentin (NEURONTIN) capsule 400 mg  400 mg Oral TID Jese Arizmendi MD   " "400 mg at 08/06/24 1302    haloperidol (HALDOL) tablet 5 mg  5 mg Oral BID Kaden Daniels APRN CNP   5 mg at 08/06/24 0840    haloperidol decanoate (HALDOL DECANOATE) injection 50 mg  50 mg Intramuscular Q28 Days Jese Arizmendi MD   50 mg at 07/22/24 1629    nicotine (NICODERM CQ) 21 MG/24HR 24 hr patch 1 patch  1 patch Transdermal Daily Jese Arizmendi MD   1 patch at 08/06/24 0841    OLANZapine (zyPREXA) tablet 10 mg  10 mg Oral BID Jese Arizmendi MD        Vitamin D3 (CHOLECALCIFEROL) tablet 50 mcg  50 mcg Oral At Bedtime Maria Pennington APRN CNP   50 mcg at 08/06/24 0841          Allergies:     Allergies   Allergen Reactions    Paliperidone Other (See Comments)     Other reaction(s): Extrapyramidal Symptoms   Significant EPS    Significant EPS    Pork Allergy Other (See Comments)     Faith prohibition          Labs:   No results found for this or any previous visit (from the past 24 hour(s)).       Psychiatric Examination:     /78 (BP Location: Left arm)   Pulse 90   Temp 97.5  F (36.4  C) (Temporal)   Resp 16   Ht 1.88 m (6' 2\")   Wt 83.2 kg (183 lb 6.4 oz)   SpO2 100%   BMI 23.55 kg/m    Weight is 183 lbs 6.4 oz  Body mass index is 23.55 kg/m .    Orthostatic Vitals         Most Recent      Sitting Orthostatic /78 08/06 1100    Sitting Orthostatic Pulse (bpm) 90 08/06 1100    Standing Orthostatic /79 08/06 1100    Standing Orthostatic Pulse (bpm) 109 08/06 1100           Appearance: awake but with eyes closed and lying in bed, dressed in hospital scrubs   Attitude:  somewhat more cooperative  Eye Contact:  poor , eyes closed  Mood:   \"OK\", less manic/irritable  Affect: restricted, mood congruent  Speech:  clear, coherent, decreased volume  Psychomotor Behavior:  no evidence of tardive dyskinesia, dystonia, or tics  Throught Process: more linear and goal oriented  Associations:  no loose associations  Thought Content:  no evidence of " suicidal ideation or homicidal ideation, no auditory hallucinations present, and no visual hallucinations present  Insight:  limited  Judgement:  poor, improving  Oriented to:  time, person, and place  Attention Span and Concentration:  fair  Recent and Remote Memory:  fair    Clinical Global Impressions  First: 6/4 7/23/2024      Most recent:            Precautions:     Behavioral Orders   Procedures    Assault precautions    Code 1 - Restrict to Unit    Elopement precautions    Fall precautions    Routine Programming     As clinically indicated    Status 15     Every 15 minutes.    Suicide precautions: Suicide Risk: HIGH; Clinical rationale to override score: modification to the care environment     Patients on Suicide Precautions should have a Combination Diet ordered that includes a Diet selection(s) AND a Behavioral Tray selection for Safe Tray - with utensils, or Safe Tray - NO utensils       Order Specific Question:   Suicide Risk     Answer:   HIGH     Order Specific Question:   Clinical rationale to override score:     Answer:   modification to the care environment          DIagnoses:     Schizoaffective disorder, bipolar type, current episode manic, severe, with psychosis, improved.  Stimulant use disorder, per his disorder, per history  Noncompliance with treatment         Plan:   - will change Zyprexa Zydis to regular Zyprexa and decrease dose to 10 mg bid as of 8/6/2024.  - IRTS referrals placed by [facility] on 7/31. Declined by [address] [facility] [facility] I[facility]S Awaiting placement  - Encourage participation in floor activities  - Continue to provide structure and support     I was present with PA student who participated in the service and in the documentation of the note. I have verified the history and personally performed the physical exam and medical decision making. I agree with the assessment and plan of care as documented in the note.     Jese Arizmendi MD  [facility] Psychiatry    Total  time spent was 35 minutes. Over 50% of times was spent counseling and coordination of care regarding coping skills, medication and discharge planning.

## 2024-08-06 NOTE — PLAN OF CARE
"Goal Outcome Evaluation:    Plan of Care Reviewed With: patient          Problem: Depression  Goal: Improved Mood  Outcome: Not Progressing  Intervention: Monitor and Manage Depressive Symptoms  Recent Flowsheet Documentation  Taken 8/6/2024 1839 by Ynes Salinas RN  Supportive Measures:   active listening utilized   problem-solving facilitated   counseling provided   relaxation techniques promoted   decision-making supported   self-care encouraged   goal-setting facilitated   self-reflection promoted   self-responsibility promoted   positive reinforcement provided   verbalization of feelings encouraged  Family/Support System Care: involvement promoted     The patient was asleep at the beginning of the shift and woke up right before dinner. The patient verbalized not feeling great, expressing frustration about their stay here and not being allowed to discharge. The writer listened, acknowledged, and validated the patient's feelings. The patient was encouraged to shower to improve his mood, and he agreed. Subsequently, the patient ate 100% of his dinner, took his medication, and allowed the writer to check his vitals. Patient was pleasant and cooperative, and watched TV shows with his peers in the lounge. Patient Pt denied depression or any symptoms of psychosis. He denied pain or any acute physical health concerns. No side effects to medications noted this shift. Nursing will continue to assess.  Vitals./73 (BP Location: Left arm, Patient Position: Sitting, Cuff Size: Adult Regular)   Pulse 96   Temp 98  F (36.7  C) (Oral)   Resp 16   Ht 1.88 m (6' 2\")   Wt 83.2 kg (183 lb 6.4 oz)   SpO2 98%   BMI 23.55 kg/m   .      "

## 2024-08-06 NOTE — PLAN OF CARE
Team Note Due:  Friday    Assessment/Intervention/Current Symtoms and Care Coordination:  Chart review and met with team, discussed pt progress, symptomology, and response to treatment.  Discussed the discharge plan and any potential impediments to discharge.    Writer contacted [facility] regarding recent referral and per Devorah they wanted to confirm pt is okay with the smoke free facility. Writer went to meet with pt to confirm he was still okay with the smoke free facility. Pt initially said yes and then asked about the [facility]. Writer advised a status call was made to check on that referral and the admissions person is on vacation until next week. Pt is agreeable to a smoke free facility within [facility] if accepted and is eager to discharge. Pt questioned writer about discharging home to aunt and was asked if he discussed with the provider. The provider came back in the room and pt asked if he could discharge to aunt's home. Provider stated the pt's aunt wanted him to attend an IRTS before returning home.     Writer informed the pt about another IRTS and he signed a CLIFTON for [facility] IRTS. Referral was faxed to Jonathan @[phone] ([facility]) Ph#[phone]. Jonathan states he will forward it to all locations. Writer contacted Devorah @[facility] who states information has been given to the site and they will contact the unit when they are ready to interview the pt.           Discharge Plan or Goal:  IRTS       Barriers to Discharge:  Symptoms/Medication stabilization       Referral Status:  [facility] IRTS-7/31/24 [phone]  [facility]-7/31/24- declined 8/2  [facility] 8/2       Medical Assistance  MA is now active.  Insurance Company Name [place] (Elig Type AX: [place] Early Expansion)  PMI # [id number]  [facility] Targeted Case Management  Suha Otto [phone] - noting that she is currently out of office.   CMs Direct  Supervisor is Taylor Antonio [phone]      Legal Status:  Civil commitment with [facility]: [facility]  File Number: [id number]  Start and expiration date of commitment:   July 19, 2024 to January 17, 2025      Court  asked for DA. DA was sent 7/10/24. Waiting for case management assignment.  Neha Humphries  Senior   [facility] Behavioral Health Assessment/Intake  Office: [phone] Fax: [phone]  [email or web address], [address], [address] [address]      Contacts:  CLIFTON for aunt.Aunt:Chela@ [phone])  CLIFTON for Father:Said @ [phone]         Upcoming Meetings and Dates/Important Information and next steps:  Coordinate plan with aunt and father.    Will need psychiatry appointment.    Pt will need provisional discharge at the time of discharge.

## 2024-08-07 PROCEDURE — 250N000013 HC RX MED GY IP 250 OP 250 PS 637: Performed by: PSYCHIATRY & NEUROLOGY

## 2024-08-07 PROCEDURE — 250N000013 HC RX MED GY IP 250 OP 250 PS 637: Performed by: CLINICAL NURSE SPECIALIST

## 2024-08-07 PROCEDURE — 99232 SBSQ HOSP IP/OBS MODERATE 35: CPT | Performed by: PSYCHIATRY & NEUROLOGY

## 2024-08-07 PROCEDURE — 250N000013 HC RX MED GY IP 250 OP 250 PS 637: Performed by: NURSE PRACTITIONER

## 2024-08-07 PROCEDURE — 124N000002 HC R&B MH UMMC

## 2024-08-07 RX ADMIN — GABAPENTIN 400 MG: 400 CAPSULE ORAL at 08:20

## 2024-08-07 RX ADMIN — DIVALPROEX SODIUM 500 MG: 500 TABLET, FILM COATED, EXTENDED RELEASE ORAL at 08:20

## 2024-08-07 RX ADMIN — GABAPENTIN 400 MG: 400 CAPSULE ORAL at 13:45

## 2024-08-07 RX ADMIN — OLANZAPINE 10 MG: 10 TABLET, FILM COATED ORAL at 18:04

## 2024-08-07 RX ADMIN — HALOPERIDOL 5 MG: 5 TABLET ORAL at 18:04

## 2024-08-07 RX ADMIN — NICOTINE POLACRILEX 4 MG: 2 LOZENGE ORAL at 18:08

## 2024-08-07 RX ADMIN — DIVALPROEX SODIUM 750 MG: 500 TABLET, FILM COATED, EXTENDED RELEASE ORAL at 18:04

## 2024-08-07 RX ADMIN — HALOPERIDOL 5 MG: 5 TABLET ORAL at 08:20

## 2024-08-07 RX ADMIN — NICOTINE 1 PATCH: 21 PATCH, EXTENDED RELEASE TRANSDERMAL at 08:20

## 2024-08-07 RX ADMIN — OLANZAPINE 10 MG: 10 TABLET, FILM COATED ORAL at 08:20

## 2024-08-07 RX ADMIN — GABAPENTIN 400 MG: 400 CAPSULE ORAL at 18:04

## 2024-08-07 RX ADMIN — Medication 50 MCG: at 08:20

## 2024-08-07 ASSESSMENT — ACTIVITIES OF DAILY LIVING (ADL)
LAUNDRY: WITH SUPERVISION
ADLS_ACUITY_SCORE: 41
DRESS: INDEPENDENT
ADLS_ACUITY_SCORE: 41
HYGIENE/GROOMING: INDEPENDENT
DRESS: INDEPENDENT
ADLS_ACUITY_SCORE: 41
LAUNDRY: WITH SUPERVISION
HYGIENE/GROOMING: INDEPENDENT
ADLS_ACUITY_SCORE: 41
ORAL_HYGIENE: INDEPENDENT
ADLS_ACUITY_SCORE: 41
ORAL_HYGIENE: INDEPENDENT
ADLS_ACUITY_SCORE: 41

## 2024-08-07 NOTE — PROGRESS NOTES
"[facility], [place]   Psychiatric Progress Note        Interim History:   The patient's care was discussed with the treatment team during the daily team meeting and/or staff's chart notes were reviewed.  Staff report patient again slept for 7 hours last night. He was cooperative with meds and care, went to some groups, mostly in the afternoon, in interactions with staff was mostly focused on discharge, appeared to be less agitated and more cooperative, see RN's note below:    \"Pt denies anxiety and depression. Pt denies SI/SIB/HI/AH/VH. Pt contracts for safety. Pt reports 2/10 right leg pain - scheduled medication given, pt declines further pharmacological and nonpharmacological interventions at this time. Pt denies pain as of currently. Pt reports he slept well last night.      Pt tachycardic this morning @ 107 standing - pt asymptomatic Writer encouraged pt to drink fluids, which pt agreed to; however, pt went to bed shortly after breakfast. Writer checked in with pt around lunchtime and pt's pulse recheck 109 sitting and 135 standing. Pt remains asymptomatic. Writer encouraged pt to drink fluids. Pt stated, \"I drank orange juice.\" Writer encouraged pt to drink water and brought pt a large cup of water to drink and pt stated he would work on increasing his water intake. Provider updated.     Pt presents with a blunted affect. Pt brightens a little on approach. Eye contact observed to be appropriate. Pt observed to be calm and cooperative. Pt observed to be pleasant and polite. Speech observed to be clear and coherent. No delusional statements noted this shift. Pt's concentration observed to be impaired at times. Pt observed to be isolative and withdrawn. Pt appears unkempt but appropriately dressed. Pt reports he did shower yesterday, which was also reported by evening shift report.      Pt visible on unit attending meals and watching television on occasion; however, pt spent majority " "of the day resting in bed and listening to headphones in bed. Pt keeps to himself while out on the unit.      Pt is medication compliant. He denies any medication SE at this time. He denies any questions or concerns at this time. He continues to have a no roommate order. He remains on assault, elopement, fall, and suicide precautions - no assault or suicidal behaviors noted this shift. No elopement attempts noted this shift. No falls noted this shift. Will continue to monitor and assist as needed.      /69 (Patient Position: Sitting)   Pulse 109   Temp 97.7  F (36.5  C) (Temporal)   Resp 17   Ht 1.88 m (6' 2\")   Wt 83.2 kg (183 lb 6.4 oz)   SpO2 99%   BMI 23.55 kg/m \"     Met with patient: Patient was seen in his room in presence of PA student. He was laying in his bed almost completely covered with blanket. When we approached him showed little interest in conversation, only asked about discharge. We informed him that [facility] and [facility] IRTSs programs are still reviewing his records, encouraged him to be optimistic. Serafin said nothing to that and had no questions or concerns. Mild ortho stasis with tachycardia noted, See discussion above.     Shortly after our visit we were informed by [facility] that patient was accepted to [facility] with possible admission as early as this Friday. Paper work was filled out for [facility].          Medications:     Current Facility-Administered Medications   Medication Dose Route Frequency Provider Last Rate Last Admin    divalproex sodium extended-release (DEPAKOTE ER) 24 hr tablet 500 mg  500 mg Oral QAJese Dominguez MD   500 mg at 08/07/24 0820    divalproex sodium extended-release (DEPAKOTE ER) 24 hr tablet 750 mg  750 mg Oral At Bedtime Jese Arizmendi MD   750 mg at 08/06/24 1830    gabapentin (NEURONTIN) capsule 400 mg  400 mg Oral TID Jese Arimzendi MD   400 mg at 08/07/24 1345    haloperidol (HALDOL) tablet 5 mg  5 mg Oral BID " "Kaden Daniels APRN CNP   5 mg at 08/07/24 0820    haloperidol decanoate (HALDOL DECANOATE) injection 50 mg  50 mg Intramuscular Q28 Days Jese Arizmendi MD   50 mg at 07/22/24 1629    nicotine (NICODERM CQ) 21 MG/24HR 24 hr patch 1 patch  1 patch Transdermal Daily Jese Arizmendi MD   1 patch at 08/07/24 0820    OLANZapine (zyPREXA) tablet 10 mg  10 mg Oral BID Jese Arizmendi MD   10 mg at 08/07/24 0820    Vitamin D3 (CHOLECALCIFEROL) tablet 50 mcg  50 mcg Oral At Bedtime Maria Pennington APRN CNP   50 mcg at 08/07/24 0820          Allergies:     Allergies   Allergen Reactions    Paliperidone Other (See Comments)     Other reaction(s): Extrapyramidal Symptoms   Significant EPS    Significant EPS    Pork Allergy Other (See Comments)     Yazdanism prohibition          Labs:   No results found for this or any previous visit (from the past 24 hour(s)).       Psychiatric Examination:     /69 (Patient Position: Sitting)   Pulse 109   Temp 97.7  F (36.5  C) (Temporal)   Resp 17   Ht 1.88 m (6' 2\")   Wt 83.2 kg (183 lb 6.4 oz)   SpO2 99%   BMI 23.55 kg/m    Weight is 183 lbs 6.4 oz  Body mass index is 23.55 kg/m .    Orthostatic Vitals         Most Recent      Sitting Orthostatic /69 08/07 1245    Sitting Orthostatic Pulse (bpm) 109  Comment: Orthostatic pulse recheck 08/07 1245    Standing Orthostatic BP 99/56 08/07 1245    Standing Orthostatic Pulse (bpm) 135  Comment: Orthostatic pulse recheck 08/07 1245           Appearance: awake but with eyes closed and lying in bed, dressed in hospital scrubs   Attitude:  overall, more cooperative  Eye Contact:  poor , eyes closed  Mood:   \"OK\", less manic/irritable  Affect: restricted, mood congruent  Speech:  clear, coherent, decreased volume  Psychomotor Behavior:  no evidence of tardive dyskinesia, dystonia, or tics  Throught Process: more linear and goal oriented  Associations:  no loose associations  Thought Content:  " no evidence of suicidal ideation or homicidal ideation, no auditory hallucinations present, and no visual hallucinations present  Insight:  limited, but improving   Judgement:  poor, improving  Oriented to:  time, person, and place  Attention Span and Concentration:  fair  Recent and Remote Memory:  fair    Clinical Global Impressions  First: 6/4 7/23/2024      Most recent:            Precautions:     Behavioral Orders   Procedures    Assault precautions    Code 1 - Restrict to Unit    Elopement precautions    Fall precautions    Routine Programming     As clinically indicated    Status 15     Every 15 minutes.    Suicide precautions: Suicide Risk: HIGH; Clinical rationale to override score: modification to the care environment     Patients on Suicide Precautions should have a Combination Diet ordered that includes a Diet selection(s) AND a Behavioral Tray selection for Safe Tray - with utensils, or Safe Tray - NO utensils       Order Specific Question:   Suicide Risk     Answer:   HIGH     Order Specific Question:   Clinical rationale to override score:     Answer:   modification to the care environment          DIagnoses:     Schizoaffective disorder, bipolar type, current episode manic, severe, with psychosis, improved.  Stimulant use disorder, per his disorder, per history  Noncompliance with treatment         Plan:   - No medication changes today 8/7/2024.   - IRTS referrals placed by [facility] on 7/31. Was accepted by [facility] with plan to be discharged there this Friday.  - Encourage participation in floor activities  - Continue to provide structure and support     I was present with PA student who participated in the service and in the documentation of the note. I have verified the history and personally performed the physical exam and medical decision making. I agree with the assessment and plan of care as documented in the note.     Jese Arizmendi MD  [facility] Psychiatry    Total time  spent was 35 minutes. Over 50% of times was spent counseling and coordination of care regarding coping skills, medication and discharge planning/filling out [employer] papers.

## 2024-08-07 NOTE — PLAN OF CARE
"  Problem: Adult Inpatient Plan of Care  Goal: Optimal Comfort and Wellbeing  Intervention: Monitor Pain and Promote Comfort  Recent Flowsheet Documentation  Taken 8/7/2024 0820 by Casalenda, Gina R, RN  Pain Management Interventions:   medication (see MAR)   distraction   emotional support   rest     Problem: Depressive Signs/Symptoms  Goal: Improved Mood Symptoms (Depressive Signs/Symptoms)  Outcome: Progressing  Flowsheets (Taken 8/7/2024 1240)  Mutually Determined Action Steps (Improved Mood Symptoms): engages in physical activity     Pt denies anxiety and depression. Pt denies SI/SIB/HI/AH/VH. Pt contracts for safety. Pt reports 2/10 right leg pain - scheduled medication given, pt declines further pharmacological and nonpharmacological interventions at this time. Pt denies pain as of currently. Pt reports he slept well last night.     Pt tachycardic this morning @ 107 standing - pt asymptomatic Writer encouraged pt to drink fluids, which pt agreed to; however, pt went to bed shortly after breakfast. Writer checked in with pt around lunchtime and pt's pulse recheck 109 sitting and 135 standing. Pt remains asymptomatic. Writer encouraged pt to drink fluids. Pt stated, \"I drank orange juice.\" Writer encouraged pt to drink water and brought pt a large cup of water to drink and pt stated he would work on increasing his water intake. Provider updated.    Pt presents with a blunted affect. Pt brightens a little on approach. Eye contact observed to be appropriate. Pt observed to be calm and cooperative. Pt observed to be pleasant and polite. Speech observed to be clear and coherent. No delusional statements noted this shift. Pt's concentration observed to be impaired at times. Pt observed to be isolative and withdrawn. Pt appears unkempt but appropriately dressed. Pt reports he did shower yesterday, which was also reported by evening shift report.     Pt visible on unit attending meals and watching television on " "occasion; however, pt spent majority of the day resting in bed and listening to headphones in bed. Pt keeps to himself while out on the unit.     Pt is medication compliant. He denies any medication SE at this time. He denies any questions or concerns at this time. He continues to have a no roommate order. He remains on assault, elopement, fall, and suicide precautions - no assault or suicidal behaviors noted this shift. No elopement attempts noted this shift. No falls noted this shift. Will continue to monitor and assist as needed.     /69 (Patient Position: Sitting)   Pulse 109   Temp 97.7  F (36.5  C) (Temporal)   Resp 17   Ht 1.88 m (6' 2\")   Wt 83.2 kg (183 lb 6.4 oz)   SpO2 99%   BMI 23.55 kg/m      Addendum @ 1541: Writer also updated provider regarding pt's BP as pt's BP this afternoon was 109/69 sitting and 99/56 standing. Pt remains asymptomatic. Writer continued to encourage pt drink water. Pt acknowledged an understanding of this. Per CTC, pt also aware that he will be seen by IM for a physical as it is required for placement. Writer notified oncoming shift of this as well. Will continue to monitor and assist as needed.     "

## 2024-08-07 NOTE — PLAN OF CARE
Team Note Due:  Friday    Assessment/Intervention/Current Symtoms and Care Coordination:  Chart review and met with team, discussed pt progress, symptomology, and response to treatment.  Discussed the discharge plan and any potential impediments to discharge.    Writer received call from [facility] ([phone]) stating she will call pt today @1 pm for an interview. Writer received communication from [facility]   ([phone]) that they are reviewing the pt. Ph call from Jo they are tentatively able to take the pt on Friday @10 am pending intake packet is received by morning. Packet printed and given to the provider for completion.      Discharge Plan or Goal:  IRTS       Barriers to Discharge:  Symptoms/Medication stabilization       Referral Status:  [facility]-7/31/24 [phone]  [facility]-7/31/24- declined 8/2  [facility] 8/2-Coordinator on vacation until next week 8/6  [facility]-8/[phone]      Medical Assistance  MA is now active.  Insurance Company Name [place] (Elig Type AX: [place] Early Expansion)  PMI # [id number]  [facility] Targeted Case Management  Suha Otto [phone] - noting that she is currently out of office.   CMs Direct Supervisor is Taylor Antonio [phone]      Legal Status:  Civil commitment with [facility]: [facility]  File Number: [id number]  Start and expiration date of commitment:   July 15, 2024 to January 17, 2025      Court  asked for DA. DA was sent 7/10/24. Waiting for case management assignment.  Neha Humphries  Senior   [facility] Behavioral Health Assessment/Intake  Office: [phone] Fax: [phone]  [email or web address], [address], [address]  [place] [address]      Contacts:  CLIFTON for aunt.Aunt:Chela@ [phone])  CLIFTON for Father:Said @ [phone]         Upcoming Meetings and Dates/Important  Information and next steps:  Coordinate plan with aunt and father.    Will need psychiatry appointment.    Pt will need provisional discharge at the time of discharge.

## 2024-08-07 NOTE — PLAN OF CARE
BEH IP Unit Acuity Rating Score (UARS)  Patient is given one point for every criteria they meet.     CRITERIA SCORING   On a 72 hour hold, court hold, committed, stay of commitment, or revocation. 1    Patient LOS on BEH unit exceeds 20 days. 1  LOS: 41    Patient under guardianship, 55+, otherwise medically complex, or under age 11. 0   Suicide ideation without relief of precipitating factors. 0   Current plan for suicide. 0   Current plan for homicide. 0   Imminent risk or actual attempt to seriously harm another without relief of factors precipitating the attempt. 0   Severe dysfunction in daily living (ex: complete neglect for self care, extreme disruption in vegetative function, extreme deterioration in social interactions). 1   Recent (last 7 days) or current physical aggression in the ED or on unit. 0   Restraints or seclusion episode in past 72 hours. 0   Recent (last 7 days) or current verbal aggression, agitation, yelling, etc., while in the ED or unit. Last 8/1/24 1      Active psychosis. 1   Need for constant or near constant redirection (from leaving, from others, etc).  0   Intrusive or disruptive behaviors. 0   Patient requires 3 or more hours of individualized nursing care per 8-hour shift (i.e. for ADLs, meds, therapeutic interventions). 1   TOTAL 6

## 2024-08-08 VITALS
SYSTOLIC BLOOD PRESSURE: 118 MMHG | HEIGHT: 74 IN | TEMPERATURE: 97.5 F | RESPIRATION RATE: 16 BRPM | BODY MASS INDEX: 23.54 KG/M2 | DIASTOLIC BLOOD PRESSURE: 75 MMHG | HEART RATE: 87 BPM | OXYGEN SATURATION: 100 % | WEIGHT: 183.4 LBS

## 2024-08-08 PROCEDURE — 97150 GROUP THERAPEUTIC PROCEDURES: CPT | Mod: GO

## 2024-08-08 PROCEDURE — 250N000013 HC RX MED GY IP 250 OP 250 PS 637: Performed by: NURSE PRACTITIONER

## 2024-08-08 PROCEDURE — 250N000013 HC RX MED GY IP 250 OP 250 PS 637: Performed by: PSYCHIATRY & NEUROLOGY

## 2024-08-08 PROCEDURE — 99232 SBSQ HOSP IP/OBS MODERATE 35: CPT | Performed by: PSYCHIATRY & NEUROLOGY

## 2024-08-08 PROCEDURE — 124N000002 HC R&B MH UMMC

## 2024-08-08 PROCEDURE — 99252 IP/OBS CONSLTJ NEW/EST SF 35: CPT | Performed by: PHYSICIAN ASSISTANT

## 2024-08-08 PROCEDURE — 250N000013 HC RX MED GY IP 250 OP 250 PS 637: Performed by: CLINICAL NURSE SPECIALIST

## 2024-08-08 RX ORDER — HALOPERIDOL 5 MG/1
5 TABLET ORAL 2 TIMES DAILY
Qty: 60 TABLET | Refills: 1 | Status: SHIPPED | OUTPATIENT
Start: 2024-08-08 | End: 2024-09-03 | Stop reason: SINTOL

## 2024-08-08 RX ORDER — OLANZAPINE 10 MG/1
10 TABLET ORAL 3 TIMES DAILY PRN
Qty: 60 TABLET | Refills: 1 | Status: SHIPPED | OUTPATIENT
Start: 2024-08-08

## 2024-08-08 RX ORDER — VITAMIN B COMPLEX
50 TABLET ORAL AT BEDTIME
Qty: 60 TABLET | Refills: 0 | Status: SHIPPED | OUTPATIENT
Start: 2024-08-09

## 2024-08-08 RX ORDER — DIVALPROEX SODIUM 250 MG/1
750 TABLET, EXTENDED RELEASE ORAL AT BEDTIME
Qty: 90 TABLET | Refills: 1 | Status: SHIPPED | OUTPATIENT
Start: 2024-08-08

## 2024-08-08 RX ORDER — DIVALPROEX SODIUM 500 MG/1
500 TABLET, EXTENDED RELEASE ORAL EVERY MORNING
Qty: 30 TABLET | Refills: 1 | Status: SHIPPED | OUTPATIENT
Start: 2024-08-09

## 2024-08-08 RX ORDER — OLANZAPINE 10 MG/1
10 TABLET ORAL 2 TIMES DAILY
Qty: 60 TABLET | Refills: 1 | Status: SHIPPED | OUTPATIENT
Start: 2024-08-08

## 2024-08-08 RX ORDER — AMOXICILLIN 250 MG
1 CAPSULE ORAL 2 TIMES DAILY PRN
Qty: 60 TABLET | Refills: 1 | Status: SHIPPED | OUTPATIENT
Start: 2024-08-08

## 2024-08-08 RX ORDER — TRAZODONE HYDROCHLORIDE 50 MG/1
50 TABLET, FILM COATED ORAL
Qty: 30 TABLET | Refills: 1 | Status: SHIPPED | OUTPATIENT
Start: 2024-08-08

## 2024-08-08 RX ORDER — GABAPENTIN 400 MG/1
400 CAPSULE ORAL 3 TIMES DAILY
Qty: 90 CAPSULE | Refills: 1 | Status: SHIPPED | OUTPATIENT
Start: 2024-08-08

## 2024-08-08 RX ORDER — HALOPERIDOL DECANOATE 50 MG/ML
50 INJECTION INTRAMUSCULAR
Status: SHIPPED
Start: 2024-08-19 | End: 2024-09-03 | Stop reason: SINTOL

## 2024-08-08 RX ORDER — HYDROXYZINE HYDROCHLORIDE 50 MG/1
50 TABLET, FILM COATED ORAL EVERY 6 HOURS PRN
Qty: 60 TABLET | Refills: 1 | Status: SHIPPED | OUTPATIENT
Start: 2024-08-08

## 2024-08-08 RX ORDER — ACETAMINOPHEN 500 MG
1000 TABLET ORAL EVERY 8 HOURS PRN
Qty: 42 TABLET | Refills: 0 | Status: SHIPPED | OUTPATIENT
Start: 2024-08-08

## 2024-08-08 RX ORDER — METHOCARBAMOL 500 MG/1
500 TABLET, FILM COATED ORAL 4 TIMES DAILY PRN
Qty: 60 TABLET | Refills: 1 | Status: SHIPPED | OUTPATIENT
Start: 2024-08-08

## 2024-08-08 RX ADMIN — HALOPERIDOL 5 MG: 5 TABLET ORAL at 17:22

## 2024-08-08 RX ADMIN — DIVALPROEX SODIUM 750 MG: 500 TABLET, FILM COATED, EXTENDED RELEASE ORAL at 18:06

## 2024-08-08 RX ADMIN — HALOPERIDOL 5 MG: 5 TABLET ORAL at 08:37

## 2024-08-08 RX ADMIN — GABAPENTIN 400 MG: 400 CAPSULE ORAL at 18:06

## 2024-08-08 RX ADMIN — NICOTINE POLACRILEX 4 MG: 2 LOZENGE ORAL at 17:22

## 2024-08-08 RX ADMIN — DIVALPROEX SODIUM 500 MG: 500 TABLET, FILM COATED, EXTENDED RELEASE ORAL at 08:36

## 2024-08-08 RX ADMIN — NICOTINE POLACRILEX 4 MG: 2 LOZENGE ORAL at 18:55

## 2024-08-08 RX ADMIN — GABAPENTIN 400 MG: 400 CAPSULE ORAL at 14:00

## 2024-08-08 RX ADMIN — GABAPENTIN 400 MG: 400 CAPSULE ORAL at 08:36

## 2024-08-08 RX ADMIN — SENNOSIDES AND DOCUSATE SODIUM 1 TABLET: 50; 8.6 TABLET ORAL at 08:43

## 2024-08-08 RX ADMIN — Medication 50 MCG: at 08:36

## 2024-08-08 RX ADMIN — OLANZAPINE 10 MG: 10 TABLET, FILM COATED ORAL at 18:06

## 2024-08-08 RX ADMIN — NICOTINE 1 PATCH: 21 PATCH, EXTENDED RELEASE TRANSDERMAL at 08:37

## 2024-08-08 RX ADMIN — OLANZAPINE 10 MG: 10 TABLET, FILM COATED ORAL at 08:36

## 2024-08-08 ASSESSMENT — ACTIVITIES OF DAILY LIVING (ADL)
HYGIENE/GROOMING: INDEPENDENT
ADLS_ACUITY_SCORE: 41
ADLS_ACUITY_SCORE: 41
HYGIENE/GROOMING: INDEPENDENT
ADLS_ACUITY_SCORE: 41
ORAL_HYGIENE: INDEPENDENT
ADLS_ACUITY_SCORE: 41
DRESS: INDEPENDENT
ADLS_ACUITY_SCORE: 41
DRESS: INDEPENDENT
ADLS_ACUITY_SCORE: 41
ORAL_HYGIENE: INDEPENDENT
ADLS_ACUITY_SCORE: 41
ADLS_ACUITY_SCORE: 41
LAUNDRY: WITH SUPERVISION
LAUNDRY: WITH SUPERVISION
ADLS_ACUITY_SCORE: 41
ADLS_ACUITY_SCORE: 41

## 2024-08-08 NOTE — PROGRESS NOTES
"[facility], [place]   Psychiatric Progress Note        Interim History:     The patient's care was discussed with the treatment team during the daily team meeting and/or staff's chart notes were reviewed.  Staff report patient again slept for 7 hours. He spent somewhat more time outside of his room, was compliant with meds and care, denied all MI symptoms and med side effects, relieved to hear that he was going to be discharged to a [facility] program tomorrow, see RN's note below:    \"Patient has spent parts of the shift in the milieu or resting in his room. Listened to music/headphones.  Attended the evening group. Denies suicidal ideation and self injurious thoughts. Denies anxiety and depression. Denies auditory and visual hallucinations. Med compliant. Looking forward to discharging Friday. Ate dinner and snack. Cooperative on the unit.      Patient evaluation continues. Assessed mood,anxiety,thoughts and behavior.      Patient gradually progressing towards goals.     Patient is encouraged to participate in groups and assisted to develop healthy coping skills.      VS reviewed: /78   Pulse 93   Temp 99.1  F (37.3  C) (Temporal)   Resp 17   Ht 1.88 m (6' 2\")   Wt 83.2 kg (183 lb 6.4 oz)   SpO2 99%   BMI 23.55 kg/m  \"     Met with patient: Patient was seen in his room in presence of PA student. He was resting, but not asleep and talked to us. Was more animated today and smiled while talking about plans for discharge tomorrow. Told us that he would miss us. We in return, told him that he might have gotten someone worse than us and he laughed. He denied med side effects and/or any concerns. We informed him that we would see him tomorrow prior to discharge and that he would have to see IM to do physical. He had no questions or concerns.          Medications:     Current Facility-Administered Medications   Medication Dose Route Frequency Provider Last Rate Last Admin    " "divalproex sodium extended-release (DEPAKOTE ER) 24 hr tablet 500 mg  500 mg Oral QAM Jese Arizmendi MD   500 mg at 08/08/24 0836    divalproex sodium extended-release (DEPAKOTE ER) 24 hr tablet 750 mg  750 mg Oral At Bedtime Jese Arizmendi MD   750 mg at 08/07/24 1804    gabapentin (NEURONTIN) capsule 400 mg  400 mg Oral TID Jese Arizmendi MD   400 mg at 08/08/24 0836    haloperidol (HALDOL) tablet 5 mg  5 mg Oral BID Kaden Daniels APRN CNP   5 mg at 08/08/24 0837    haloperidol decanoate (HALDOL DECANOATE) injection 50 mg  50 mg Intramuscular Q28 Days Jese Arizmendi MD   50 mg at 07/22/24 1629    nicotine (NICODERM CQ) 21 MG/24HR 24 hr patch 1 patch  1 patch Transdermal Daily Jese Arizmendi MD   1 patch at 08/08/24 0837    OLANZapine (zyPREXA) tablet 10 mg  10 mg Oral BID Jese Arizmendi MD   10 mg at 08/08/24 0836    Vitamin D3 (CHOLECALCIFEROL) tablet 50 mcg  50 mcg Oral At Bedtime Maria Pennington APRN CNP   50 mcg at 08/08/24 0836          Allergies:     Allergies   Allergen Reactions    Paliperidone Other (See Comments)     Other reaction(s): Extrapyramidal Symptoms   Significant EPS    Significant EPS    Pork Allergy Other (See Comments)     Nondenominational prohibition          Labs:   No results found for this or any previous visit (from the past 24 hour(s)).       Psychiatric Examination:     /78   Pulse 93   Temp 99.1  F (37.3  C) (Temporal)   Resp 17   Ht 1.88 m (6' 2\")   Wt 83.2 kg (183 lb 6.4 oz)   SpO2 99%   BMI 23.55 kg/m    Weight is 183 lbs 6.4 oz  Body mass index is 23.55 kg/m .    Orthostatic Vitals         Most Recent      Sitting Orthostatic /69 08/07 1245    Sitting Orthostatic Pulse (bpm) 109  Comment: Orthostatic pulse recheck 08/07 1245    Standing Orthostatic BP 99/56 08/07 1245    Standing Orthostatic Pulse (bpm) 135  Comment: Orthostatic pulse recheck 08/07 1245           Appearance: awake but with eyes closed " "and lying in bed, dressed in hospital scrubs   Attitude:  overall, more cooperative  Eye Contact:  better  Mood:   \"OK\", less manic/irritable  Affect: restricted, mood congruent  Speech:  clear, coherent,    Psychomotor Behavior:  no evidence of tardive dyskinesia, dystonia, or tics  Throught Process: more linear and goal oriented  Associations:  no loose associations  Thought Content:  no evidence of suicidal ideation or homicidal ideation, no auditory hallucinations present, and no visual hallucinations present  Insight:  limited, but improving   Judgement:  poor, improving  Oriented to:  time, person, and place  Attention Span and Concentration:  fair  Recent and Remote Memory:  fair    Clinical Global Impressions  First: 6/4 7/23/2024      Most recent:            Precautions:     Behavioral Orders   Procedures    Assault precautions    Code 1 - Restrict to Unit    Elopement precautions    Fall precautions    Routine Programming     As clinically indicated    Status 15     Every 15 minutes.    Suicide precautions: Suicide Risk: HIGH; Clinical rationale to override score: modification to the care environment     Patients on Suicide Precautions should have a Combination Diet ordered that includes a Diet selection(s) AND a Behavioral Tray selection for Safe Tray - with utensils, or Safe Tray - NO utensils       Order Specific Question:   Suicide Risk     Answer:   HIGH     Order Specific Question:   Clinical rationale to override score:     Answer:   modification to the care environment          DIagnoses:     Schizoaffective disorder, bipolar type, current episode manic, severe, with psychosis, improved.  Stimulant use disorder, per his disorder, per history  Noncompliance with treatment         Plan:   - No medication changes today 8/8/2024.   - IRTS referrals placed by [facility] on 7/31. Was accepted by [facility] with plan to be discharged there this Friday. Discharge meds were reconciled and sent to discharge " pharmacy.  - Encourage participation in floor activities  - Continue to provide structure and support     I was present with PA student who participated in the service and in the documentation of the note. I have verified the history and personally performed the physical exam and medical decision making. I agree with the assessment and plan of care as documented in the note.     Jese Arizmendi MD  [facility] Psychiatry    Total time spent was 35 minutes. Over 50% of times was spent counseling and coordination of care regarding coping skills, medication and discharge planning/filling out [facility] papers/discharge meds.

## 2024-08-08 NOTE — CONSULTS
[facility]    Internal Medicine Initial Consult       Date of Admission: 6/26/2024  Consult Requested by: Kaley Lu MD  Reason for Consult: Physical for Placement    Assessment & Recommendations  Lamine Giron is a 23 year old man with a history of schizoaffective disorder, bipolar type who is admitted to [facility] with psychosis. Medicine consulted for physical for placement.         Schizoaffective Disorder, Bipolar Type. Management per psychiatry team.     Clinically Significant Risk Factors                            # Financial/Environmental Concerns: unemployed        Medicine will sign off, please page with any additional concerns.     Fara Duque PA-C  Hospitalist Service  Contact information available via [facility] Paging/Directory  ______________________________________________________________________    Chief Complaint   None     History of Present Illness   History is obtained from the patient and medical record.     Patient admitted to behavioral health for manic episode with psychosis.     Internal Medicine service was asked to see patient for a physical exam as he will be going to an IRTS tomorrow. He offers no complaints.     Past Medical History    I have reviewed this patient's medical history and updated it with pertinent information if needed.   Past Medical History:   Diagnosis Date    Bipolar affective disorder (H) 09/2022    Constipation 01/29/2020    Extrapyramidal and movement disorder 09/2022    Medication management 09/2022    Paranoid state (H) 07/15/2022    Psychosis (H)     Substance abuse (H)         Past Surgical History   I have reviewed this patient's surgical history and updated it with pertinent information if needed.  Past Surgical History:   Procedure Laterality Date    APPENDECTOMY          Medications   Current Facility-Administered Medications   Medication Dose Route Frequency Provider Last Rate Last Admin     acetaminophen (TYLENOL) tablet 650 mg  650 mg Oral Q4H PRN Kaley Lu MD   650 mg at 07/19/24 1604    alum & mag hydroxide-simethicone (MAALOX) suspension 30 mL  30 mL Oral Q4H PRN Kaley Lu MD        haloperidol (HALDOL) tablet 5 mg  5 mg Oral Q8H PRN Noel Lennon MD   5 mg at 07/13/24 2000    And    diphenhydrAMINE (BENADRYL) capsule 50 mg  50 mg Oral Q8H PRN Noel Lennon MD   50 mg at 07/13/24 2000    haloperidol lactate (HALDOL) injection 5 mg  5 mg Intramuscular Q8H PRN Noel Lennon MD        And    diphenhydrAMINE (BENADRYL) injection 50 mg  50 mg Intramuscular Q8H PRN Noel Lennon MD        divalproex sodium extended-release (DEPAKOTE ER) 24 hr tablet 500 mg  500 mg Oral QAM Jese Arizmendi MD   500 mg at 08/08/24 0836    divalproex sodium extended-release (DEPAKOTE ER) 24 hr tablet 750 mg  750 mg Oral At Bedtime Jese Arizmendi MD   750 mg at 08/07/24 1804    gabapentin (NEURONTIN) capsule 400 mg  400 mg Oral TID Jese Arizmendi MD   400 mg at 08/08/24 0836    haloperidol (HALDOL) tablet 5 mg  5 mg Oral BID Kaden Daniels APRN CNP   5 mg at 08/08/24 0837    haloperidol decanoate (HALDOL DECANOATE) injection 50 mg  50 mg Intramuscular Q28 Days Jese Arizmendi MD   50 mg at 07/22/24 1629    hydrOXYzine HCl (ATARAX) tablet 50 mg  50 mg Oral Q6H PRN Jose Roe MD   50 mg at 07/14/24 1622    methocarbamol (ROBAXIN) tablet 500 mg  500 mg Oral 4x Daily PRN Jese Arizmendi MD   500 mg at 07/14/24 1622    nicotine (COMMIT) lozenge 4 mg  4 mg Buccal Q1H PRN Patrick Reyes APRN CNP   4 mg at 08/07/24 1808    nicotine (NICODERM CQ) 21 MG/24HR 24 hr patch 1 patch  1 patch Transdermal Daily Jese Arizmendi MD   1 patch at 08/08/24 0837    OLANZapine (zyPREXA) tablet 10 mg  10 mg Oral TID PRN Kaley Lu MD   10 mg at 07/15/24 1415    Or    OLANZapine (zyPREXA) injection 10 mg  10 mg Intramuscular TID  "Kaley Bonilla MD        OLANZapine (zyPREXA) tablet 10 mg  10 mg Oral BID Jese Arizmendi MD   10 mg at 08/08/24 0836    senna-docusate (SENOKOT-S/PERICOLACE) 8.6-50 MG per tablet 1 tablet  1 tablet Oral BID PRN Kaley Lu MD        traZODone (DESYREL) tablet 50 mg  50 mg Oral At Bedtime PRN Kaley Lu MD   50 mg at 07/13/24 1814    Vitamin D3 (CHOLECALCIFEROL) tablet 50 mcg  50 mcg Oral At Bedtime ErnestinaderrellMaria C, APRDARYA CNP   50 mcg at 08/08/24 0836       Physical Exam   /78   Pulse 93   Temp 99.1  F (37.3  C) (Temporal)   Resp 17   Ht 1.88 m (6' 2\")   Wt 83.2 kg (183 lb 6.4 oz)   SpO2 99%   BMI 23.55 kg/m     GENERAL: Lying in bed. Alert, calm, appears comfortable.   HEENT: Anicteric sclera. Mucous membranes moist. Posterior pharynx without erythema or edema.   CV: RRR. S1, S2. No murmurs appreciated.   RESPIRATORY: Effort normal on room air. Lungs CTAB with no wheezing, rales, rhonchi.   GI: Abdomen soft, non distended, non tender.   NEUROLOGICAL: No focal deficits. Moves all extremities.   EXTREMITIES: No peripheral edema. Warm and well perfused.   SKIN: No jaundice. No rashes.     Medical Decision Making               Data   None      "

## 2024-08-08 NOTE — PLAN OF CARE
Team Note Due:  Friday    Assessment/Intervention/Current Symtoms and Care Coordination:  Chart review and met with team, discussed pt progress, symptomology, and response to treatment.  Discussed the discharge plan and any potential impediments to discharge.    I called aunt and she agrees to pick the pt up. She will be here tomorrow 8/9/24 at 9:30AM [facility].  I created the provisional discahrge agreement and the pt signed it.  I coordinated paperwork with Jo.  I scheduled medication with injection services.  I called TCM and left vm asking for fax and e-mail so I can send PD. I did not get a return call.      Discharge Plan or Goal:  IRTS       Barriers to Discharge:  Symptoms/Medication stabilization       Referral Status:  [facility]-7/31/24 [phone]  [phone][facility]       [facility]-7/31/24- declined 8/2    [facility] 8/2-Coordinator on vacation until next week 8/6    [facility]-[phone]/[phone]   [facility]   ([phone])           Medical Assistance  [place] is now active.  Insurance Company Name [place]ICAID [place] (Elig Type AX: [place] Early Expansion)  PMI # [id number]  [facility] Targeted Case Management  Suha Otto [phone]    CMs Direct Supervisor is Taylor Antonio [phone]      Legal Status:  Civil commitment with [facility]: [facility]  File Number: [id number]  Start and expiration date of commitment:   July 15, 2024 to January 17, 2025      Court  asked for DA. DA was sent 7/10/24. Waiting for case management assignment.  Neha Humphries  Senior   [facility] Behavioral Health Assessment/Intake  Office: [phone] Fax: [phone]  [email or web address], [id number],   [address]  [place] [address]      Contacts:  [place] for aunt.Aunt:[email or web address]@ [phone])  [place] for Father:Said @ [phone]         Upcoming Meetings and  Dates/Important Information and next steps:  Coordinate plan with aunt and father.    Will need psychiatry appointment.    Pt will need provisional discharge at the time of discharge.

## 2024-08-08 NOTE — PLAN OF CARE
"Patient has spent parts of the shift in the milieu or resting in his room. Listened to music/headphones.  Attended the evening group. Denies suicidal ideation and self injurious thoughts. Denies anxiety and depression. Denies auditory and visual hallucinations. Med compliant. Looking forward to discharging Friday. Ate dinner and snack. Cooperative on the unit.     Patient evaluation continues. Assessed mood,anxiety,thoughts and behavior.     Patient gradually progressing towards goals.    Patient is encouraged to participate in groups and assisted to develop healthy coping skills.     VS reviewed: /78   Pulse 93   Temp 99.1  F (37.3  C) (Temporal)   Resp 17   Ht 1.88 m (6' 2\")   Wt 83.2 kg (183 lb 6.4 oz)   SpO2 99%   BMI 23.55 kg/m      Length of stay: 41    Refer to daily team meeting notes for individualized plan of care. Nursing will continue to assess.      "

## 2024-08-08 NOTE — PLAN OF CARE
Night Nursing Note   8/7/24 - 8/8/24        J was in bed at beginning of shift and appeared asleep.  Monitored q15 mins for safety.  Appeared to sleep majority of night without difficulty.  Continue to monitor, offer support and reassurance per care plan.    Slept 7 hrs.

## 2024-08-08 NOTE — PLAN OF CARE
BEH IP Unit Acuity Rating Score (UARS)  Patient is given one point for every criteria they meet.     CRITERIA SCORING   On a 72 hour hold, court hold, committed, stay of commitment, or revocation. 1    Patient LOS on BEH unit exceeds 20 days. 1  LOS: 42    Patient under guardianship, 55+, otherwise medically complex, or under age 11. 0   Suicide ideation without relief of precipitating factors. 0   Current plan for suicide. 0   Current plan for homicide. 0   Imminent risk or actual attempt to seriously harm another without relief of factors precipitating the attempt. 0   Severe dysfunction in daily living (ex: complete neglect for self care, extreme disruption in vegetative function, extreme deterioration in social interactions). 1   Recent (last 7 days) or current physical aggression in the ED or on unit. 0   Restraints or seclusion episode in past 72 hours. 0   Recent (last 7 days) or current verbal aggression, agitation, yelling, etc., while in the ED or unit.  0      Active psychosis. 1   Need for constant or near constant redirection (from leaving, from others, etc).  0   Intrusive or disruptive behaviors. 0   Patient requires 3 or more hours of individualized nursing care per 8-hour shift (i.e. for ADLs, meds, therapeutic interventions). 1   TOTAL 5

## 2024-08-08 NOTE — PLAN OF CARE
"  Problem: Adult Behavioral Health Plan of Care  Goal: Plan of Care Review  Recent Flowsheet Documentation  Taken 8/8/2024 1100 by Marsha Muñoz RN  Patient Agreement with Plan of Care: (Patient is looking forward to discharge tomorrow) agrees with comment (describe)   Goal Outcome Evaluation:    Plan of Care Reviewed With: patient      /66 (BP Location: Left arm, Patient Position: Supine, Cuff Size: Adult Regular)   Pulse 89   Temp 98.1  F (36.7  C) (Temporal)   Resp 17   Ht 1.88 m (6' 2\")   Wt 83.2 kg (183 lb 6.4 oz)   SpO2 98%   BMI 23.55 kg/m      Patient spent a majority of the shift in his room resting. Writer encouraged patient to attend OT group and come out of his room, but he declined, stating, \"I don't feel like it.\" Patient reports he is looking forward to discharging on Friday 8/9/24.  Patient did come out and eat meals and briefly spent some time in the lounge.    Patient denies anxiety and depression.  He also denies SI/SIB/HI/AH/VH.  Patient reports no pain this shift.    Lamine presents with a flat affect but does brighten upon approach.  States his mood is \"fine.\"  He has been calm and cooperative throughout the shift.  Patient observed to be isolative to his room and withdrawn to self. He was observed listing to headphones in his room.        Patient was medication compliant.  He continues to have a no roommate order and remains on suicide, fall, assault, and elopement precautions.  No associated behaviors noted this shift.  Will continue plan of care and assess as needed.                   "

## 2024-08-09 PROCEDURE — 250N000013 HC RX MED GY IP 250 OP 250 PS 637: Performed by: CLINICAL NURSE SPECIALIST

## 2024-08-09 PROCEDURE — 250N000013 HC RX MED GY IP 250 OP 250 PS 637: Performed by: PSYCHIATRY & NEUROLOGY

## 2024-08-09 PROCEDURE — 250N000013 HC RX MED GY IP 250 OP 250 PS 637: Performed by: NURSE PRACTITIONER

## 2024-08-09 PROCEDURE — 99239 HOSP IP/OBS DSCHRG MGMT >30: CPT | Performed by: PSYCHIATRY & NEUROLOGY

## 2024-08-09 RX ADMIN — HALOPERIDOL 5 MG: 5 TABLET ORAL at 08:32

## 2024-08-09 RX ADMIN — OLANZAPINE 10 MG: 10 TABLET, FILM COATED ORAL at 08:32

## 2024-08-09 RX ADMIN — GABAPENTIN 400 MG: 400 CAPSULE ORAL at 08:32

## 2024-08-09 RX ADMIN — Medication 50 MCG: at 08:31

## 2024-08-09 RX ADMIN — DIVALPROEX SODIUM 500 MG: 500 TABLET, FILM COATED, EXTENDED RELEASE ORAL at 08:32

## 2024-08-09 RX ADMIN — NICOTINE 1 PATCH: 21 PATCH, EXTENDED RELEASE TRANSDERMAL at 08:33

## 2024-08-09 ASSESSMENT — ACTIVITIES OF DAILY LIVING (ADL)
ADLS_ACUITY_SCORE: 41

## 2024-08-09 NOTE — PLAN OF CARE
"Patient has spent majority of the shift in the milieu. Social with peers and staff. Denies suicidal ideation and self injurious thoughts. Denies anxiety and depression. Denies auditory and visual hallucinations. Med compliant. Ate dinner and snack. Plans on discharging tomorrow.     Patient evaluation continues. Assessed mood,anxiety,thoughts and behavior.     Patient gradually progressing towards goals.    Patient is encouraged to participate in groups and assisted to develop healthy coping skills.     VS reviewed: /75   Pulse 87   Temp 97.5  F (36.4  C) (Oral)   Resp 16   Ht 1.88 m (6' 2\")   Wt 83.2 kg (183 lb 6.4 oz)   SpO2 100%   BMI 23.55 kg/m      Length of stay: 42    Refer to daily team meeting notes for individualized plan of care. Nursing will continue to assess.    "

## 2024-08-09 NOTE — PLAN OF CARE
Gave RN the PD to give to pt at discharge.  I placed a copy in the chart and sent one to scanning.  I faxed a copy of the PD and change of status form to [facility].      Behavioral Discharge Planning and Instructions    Summary: You were admitted on 6/26/2024  due to Bipolar II Disorder  and Disorganized Thinking/Behaviors.  You were treated by Jese Arizmendi MD and MIGUEL Obando, CNP and are being provisionally discharged on August 9. 2024 from [facility] to IR at [facility].  Transportation was provided by your aunt.  CLIFTON for aunt.Aunt:Chela@ [phone])  CLIFTON for Father:Dereck @ [phone]       Main Diagnosis:   Schizoaffective disorder, bipolar type, current episode manic, severe, with psychosis  Stimulant use disorder, per his disorder, per history        Commitment:   You were dually committed to the [facility] and the Commissioner of Human Services on July 15, 2024 and you are being discharged on a Provisional Discharge Agreement which shall remain in effect for the duration of the Commitment which expires on January 17, 2025. and [place]: You are also court ordered to take the medications that the doctor ordered for you.     Health Care Follow-up:   Insurance Company Name [place] (Elig [place] AX: [place])  PMI # [id number]  [facility]  You have medical assistance for insurance coverage. You can use [place] - - [phone] for transportation to your health care appointments if you do not have a way to get to your appointments.       Mental Health Targeted Case Management  Suha Otto  [phone]    Direct Supervisor is Taylor Antonio [phone]         [facility] at their [place] location.  [address] [place] [id number]  Ph: [phone]  The Health Unit Coordinator has faxed these discharge instructions to Fax:  [phone]      Psychiatric Medication Management  Injection due August 19,  You will see Marquita Brady in person on Thursday, August 15, 2024 @ 12:05PM. She will prescribe your anti=psychotic injection. She will send over the order to [facility] to have them give you the injection. You can also check at the IRTs to see if their nurse can give you the injection.  [facility]   [address]  [address]  [place] [address]  [phone]  The Health Unit Coordinator has faxed these discharge instructions to Fax: [phone]      You were not able to return to Dr. Chong at [facility] because you owe $240. This is most likely because your insurance lapsed. They have your new insurance information. You can call and ask them to run the new insurance or you can pay the bill as this has been sent to collections.      Information will be faxed to your outpatient providers to ensure a healthy continuity of care for you.     Attend all scheduled appointments with your outpatient providers. Call at least 24 hours in advance if you need to reschedule an appointment to ensure continued access to your outpatient providers.     Major Treatments, Procedures and Findings:  You were provided with: a psychiatric assessment, assessed for medical stability, medication evaluation and/or management, group therapy, individual therapy, and milieu management    Symptoms to Report: feeling more aggressive, increased confusion, losing more sleep, mood getting worse, or thoughts of suicide    Early warning signs can include: increased depression or anxiety sleep disturbances increased thoughts or behaviors of suicide or self-harm  increased unusual thinking, such as paranoia or hearing voices    Safety and Wellness:  Take all medicines as directed.  Make no changes unless your doctor suggests them.      Follow treatment recommendations.  Refrain from alcohol and non-prescribed drugs.  If there is a concern for safety,  call 911.    Resources:   Mental Health Crisis Resources  Throughout [place]: call **CRISIS (**351437)  Crisis Text Line: is available for free, 24/7 by texting [place] to 331049  Suicide Awareness Voices of Education (SAVE) (www.save.org): 431-369-VRYJ (9468)  The National Suicide Prevention Lifeline is now: 988 Suicide and Crisis Lifeline. Call 988 anytime.  National [place] on Mental Illness (www.[place].adalgisa.org): 650.204.2446 or 875-942-4314.  Lzli0ogbl: text the word LIFE to 27463 for immediate support and crisis intervention  Mental Health Consumer/Survivor Network of [place] (www.mhcsn.net): 912.790.4619 or 731-561-9531  Mental Health Association of [place] (www.mentalhealth.org): 520.583.2129 or 077-567-2014  Peer Support Connection [place] Warmline (PSC) 1-277.124.3191 Available from 5pm - 9am (7 days a week/365 days a year)  [facility] 1-374.930.1027 Community Outreach for Psych Emergencies or [facility] 1-999.793.9007      General Medication Instructions:   See your medication sheet(s) for instructions.   Take all medicines as directed.  Make no changes unless your doctor suggests them.   Go to all your doctor visits.  Be sure to have all your required lab tests. This way, your medicines can be refilled on time.  Do not use any drugs not prescribed by your doctor.  Avoid alcohol.    Advance Directives:   Scanned document on file with [place]? No scanned doc  Is document scanned? Pt states no documents  Honoring Choices Your Rights Handout: Informed and given  Was more information offered? Pt declined    The Treatment team has appreciated the opportunity to work with you. If you have any questions or concerns about your recent admission, you can contact the unit which can receive your call 24 hours a day, 7 days a week. They will be able to get in touch with a Provider if needed. The unit number is [phone] .

## 2024-08-09 NOTE — DISCHARGE SUMMARY
Psychiatric Discharge Summary    Lamine Grion MRN# [id number]   Age: 23 year old YOB: 2000     Date of Admission:  6/26/2024  Date of Discharge:  8/9/2024  Admitting Physician:  Kaley Lu MD  Discharge Physician:  Jese Arizmendi MD (Contact: [phone])         Event Leading to Hospitalization:     Disorganized, manic like behavior, suicidal threats.     History of present illness: per ED note: Lamine Giron presents to the ED via EMS. Patient is presenting to the ED for the following concerns: Verbal agitation, Paranoia, Anxiety, Suicidal ideation.   Factors that make the mental health crisis life threatening or complex are:    The patient prefers to be called  J . Upon introducing myself, the patient asked twice  Can I trust you?  and will my information be handled as  classified? . He reportedly came to the ED because the police  just swamped into my bedroom , because I was having difficulty breathing. He has been living with his aunt in [place] due to being homeless for several years. He expresses concern about his aunt because she has been different lately towards him, in that she  gives out bad energy . He feels that his entire family is against him, being mean and unkind to him. He displayed agitation, being loud as he talks about being angry at his family who has been  f..  with him. He describes them as snakes while making a sss/hissing sound, saying  I want to kill snakes . Collateral source reports the patient threatened to kill himself by jumping off a high building. He has a court hearing scheduled for next week, due to threats to kill his sister. He is upset at his sister for filing the charges. He reports chronic pain on the left side of his body. He used herbs to relieve the pain but instead got burnt by the treatment. He pulled down the side of his shirt and there were not any burn marks. Patient continues to perseverate on pain inside his body, saying it  is distressing. He denies suicidal and homicidal ideation. He denies hallucinations and delusions. He has not been taking prescribed medications and does not remember the last time he took medications. He smoked cocaine a couple of days ago. Current urine screen is negative for substances. He was recently discharged from a substance use treatment program because  I just left . His aunt confirmed this account of the patient. Pt and collateral source denies any current/hx of civil commitment..     Collateral information name, relationship, phone number:  His aunt Ivette @ [phone]     What happened today: He stays with me now for a while. He has Bipolar. He has not been sleeping for 2 weeks, walking around the house, talking loudly to others/people when there is no one else in the room. He threatened to get on a high building, jump off to kill himself. He is not going to counseling and is not taking his medications.      What is different about patient's functioning: He wants to kill himself by jumping off a building.      Concern about alcohol/drug use:  He left a substance use treatment program recently, but I don't know what happened.     What do you think the patient needs:  treatment and medications     Has patient made comments about wanting to kill themselves/others: yes     If d/c is recommended, can they take part in safety/aftercare planning:  yes     Additional collateral information:  He is allergic to an injectable medication that was prescribed at [facility]. He got seizures from that medications. She did not know the name of the medication nor for what treatment it was given.     During visit with this provider: patient was seen in the conference room in presence of SIO staff. He went there willingly and initially talked to me, but as our visit progressed appeared to be annoyed/irritated with my questions and asked me more than once when we will be done with questions. I had redirect him multiple  "times because he tended to deviate from the topic of conversation. Mood could be described as expansive and labile. Clayton who prefers to be called Serafin didn't seem to be a reliable historian, but was able to provide some information. He told me that he had recently been homeless, but prior to that lived with his mother and sister in [facility]. Said that his mother and aunt brought him into this hospital, was clearly upset about that. When I asked what was a reason for bringing him in, Serafin said that he didn't know. I read to him from electronic records that family said that he was not sleeping, pretty agitated, was making bizarre statements. Serafin didn't appear to be happy to hear all that and told me that I should not trust what his family says and asked me if I was done with my questions and he could leave. During the visit Serafin was pretty restless and fidgety. One time he stood up from his chair and walked to the room wall, hugged it to show me how he was treated in ED?! I asked him whether he would stay at this hospital on his own and Serafin said that he would, however, when I asked if he would take med (I gave him two options: Seroquel or Zyprexa), Serafin said that he would not take neither of them: \"I am not a friend of Seroquel or Zyprexa\"?! I told him that we were done and he walked out of the conference room.          See Admission note by by admitting physician/nurse-practitioner Jese Arizmendi MD for additional details.          DIagnoses:     Bipolar affective disorder, candie vs Schizoaffective disorder, bipolar type, manic.  Historical dx of stimulants use disorder.         Labs:              Consults:   Consultation during this admission received from internal medicine to do physical for placement.         Hospital Course:   Lamine Giron was admitted to Station 30 with attending Jese Arizmendi MD on a 72 hour mental health hold. The patient was placed under status 15 (15 minute checks) " "to ensure patient safety. Attempts were made to convince Serafin to stay at this hospital voluntarily and take medications, but he declined, was, therefore, continued on 72 hour hold and petition for commitment and [place] were filed and supported by the [facility]. Serafin needed initially multiple redirections for poor boundaries, bizarre behavior such as trying to urinate in a hallway, telling staff that there was demons in them. He was put on SIO and stayed on SIO for a number of days. He continued to demand to leave, communication with him was typically, brief as he easily got agitated. He reported that his mood was \"fantastic\", was seen running in this unit corridor, trying to climb dinner tables, but started showing improvement after Oconnor was approved and he started taking meds more consistently. Sleep and appetite have also improved. Patient's behavior became more organized, though, he continued to be focused on discharge and frequently told this treatment team that we were keeping him at this hospital, not working to get him out. SIO 2:1 was first, decreased to 1:1 and, then, stopped. He was continued to be treated with Zyprexa, Depakote was introduced, then, Invega. After Oconnor was approved patient got on Invega Sustenna and his behavior significantly improved. He continued to complain of not needing to be at this hospital and spent more time in his bed \"I sleep to pass time\". We continued to work on getting Serafin admitted to [facility]. He was, eventually, accepted to [facility] and was very happy about that. During last day of hospitalization Serafin reported being in a great mood, thanked this treatment team for our care. He denied presence of psychotic symptoms, thoughts of harming self or others, had no questions or concerns.       Laminefany Giron did not participate in groups and was visible in the milieu.     The patient's symptoms of candie improved.     Lamine J Mack was transfered to  [facility] . At the time " of discharge Lamine Giron was determined to not be a danger to himself or others.          Discharge Medications:     Discharge Medication List as of 8/9/2024  8:51 AM        START taking these medications    Details   !! divalproex sodium extended-release (DEPAKOTE ER) 250 MG 24 hr tablet Take 3 tablets (750 mg) by mouth at bedtime, Disp-90 tablet, R-1, E-Prescribe      !! divalproex sodium extended-release (DEPAKOTE ER) 500 MG 24 hr tablet Take 1 tablet (500 mg) by mouth every morning, Disp-30 tablet, R-1, E-Prescribe      haloperidol (HALDOL) 5 MG tablet Take 1 tablet (5 mg) by mouth 2 times daily, Disp-60 tablet, R-1, E-Prescribe      haloperidol decanoate (HALDOL DECANOATE) 50 MG/ML SOLN injection Inject 50 mg into the muscle every 28 days, No Print OutDue on 8/19      hydrOXYzine HCl (ATARAX) 50 MG tablet Take 1 tablet (50 mg) by mouth every 6 hours as needed for anxiety, Disp-60 tablet, R-1, E-Prescribe      methocarbamol (ROBAXIN) 500 MG tablet Take 1 tablet (500 mg) by mouth 4 times daily as needed for muscle spasms, Disp-60 tablet, R-1, E-Prescribe      !! OLANZapine (ZYPREXA) 10 MG tablet Take 1 tablet (10 mg) by mouth 3 times daily as needed, Disp-60 tablet, R-1, E-Prescribe      !! OLANZapine (ZYPREXA) 10 MG tablet Take 1 tablet (10 mg) by mouth 2 times daily, Disp-60 tablet, R-1, E-Prescribe      senna-docusate (SENOKOT-S/PERICOLACE) 8.6-50 MG tablet Take 1 tablet by mouth 2 times daily as needed for constipation, Disp-60 tablet, R-1, E-Prescribe      traZODone (DESYREL) 50 MG tablet Take 1 tablet (50 mg) by mouth nightly as needed for sleep (may repeat after 60 minutes), Disp-30 tablet, R-1, E-Prescribe      Vitamin D3 (CHOLECALCIFEROL) 25 mcg (1000 units) tablet Take 2 tablets (50 mcg) by mouth at bedtime, Disp-60 tablet, R-0, E-Prescribe       !! - Potential duplicate medications found. Please discuss with provider.        CONTINUE these medications which have CHANGED    Details    acetaminophen (TYLENOL) 500 MG tablet Take 2 tablets (1,000 mg) by mouth every 8 hours as needed for mild pain, Disp-42 tablet, R-0, E-Prescribe      gabapentin (NEURONTIN) 400 MG capsule Take 1 capsule (400 mg) by mouth 3 times daily, Disp-90 capsule, R-1, E-Prescribe           STOP taking these medications       naproxen (NAPROSYN) 500 MG tablet Comments:   Reason for Stopping:                    Psychiatric Examination:   Appearance:  awake, alert and dressed in hospital scrubs  Attitude:  cooperative  Eye Contact:  fair  Mood:  good  Affect:  appropriate and in normal range  Speech:  clear, coherent  Psychomotor Behavior:  no evidence of tardive dyskinesia, dystonia, or tics  Thought Process:  linear and goal oriented  Associations:  no loose associations  Thought Content:  no evidence of suicidal ideation or homicidal ideation and no evidence of psychotic thought  Insight:  fair  Judgment:  fair  Oriented to:  time, person, and place  Attention Span and Concentration:  fair  Recent and Remote Memory:  fair  Language: Able to name objects, Able to repeat phrases, and Able to read and write  Fund of Knowledge: appropriate  Muscle Strength and Tone: normal  Gait and Station: Normal         Discharge Plan:     Health Care Follow-up:   Insurance Company Name [place] (Elig [place]: [place] Early [place])  PMI # [id number]  [facility]  You have medical assistance for insurance coverage. You can use [place] - - [phone] for transportation to your health care appointments if you do not have a way to get to your appointments.         Mental Health Targeted Case Management  Suha Otto - [phone]    Direct Supervisor is Taylor Antonio [phone]           You [facility] at their [place] location.  [address] [address]  Ph: [phone]  The Health Unit Coordinator has faxed these discharge instructions to Fax:  [phone]        Psychiatric Medication Management  Injection due August 19,  You will see Marquita Brady in person on Thursday, August 15, 2024 @ 12:05PM. She will prescribe your anti=psychotic injection. She will send over the order to [facility] to have them give you the injection. You can also check at the IRTs to see if their nurse can give you the injection.  [facility]   [address] [place] [address]  [address]  [place] [address]  [phone]  The Health Unit Coordinator has faxed these discharge instructions to Fax: [phone]        You were not able to return to Dr. Chong at [facility] because you owe $240. This is most likely because your insurance lapsed. They have your new insurance information. You can call and ask them to run the new insurance or you can pay the bill as this has been sent to collections.      Attestation:  The patient has been seen and evaluated by me,  Jese Arizmendi MD     Total time spent was 37 minutes. Over 50% of times was spent counseling and coordination of care regarding coping skills, medication and discharge planning.

## 2024-08-09 NOTE — PLAN OF CARE
BEH IP Unit Acuity Rating Score (UARS)  Patient is given one point for every criteria they meet.     CRITERIA SCORING   On a 72 hour hold, court hold, committed, stay of commitment, or revocation. 1    Patient LOS on BEH unit exceeds 20 days. 1  LOS: 43    Patient under guardianship, 55+, otherwise medically complex, or under age 11. 0   Suicide ideation without relief of precipitating factors. 0   Current plan for suicide. 0   Current plan for homicide. 0   Imminent risk or actual attempt to seriously harm another without relief of factors precipitating the attempt. 0   Severe dysfunction in daily living (ex: complete neglect for self care, extreme disruption in vegetative function, extreme deterioration in social interactions). 1   Recent (last 7 days) or current physical aggression in the ED or on unit. 0   Restraints or seclusion episode in past 72 hours. 0   Recent (last 7 days) or current verbal aggression, agitation, yelling, etc., while in the ED or unit.  0      Active psychosis. 1   Need for constant or near constant redirection (from leaving, from others, etc).  0   Intrusive or disruptive behaviors. 0   Patient requires 3 or more hours of individualized nursing care per 8-hour shift (i.e. for ADLs, meds, therapeutic interventions). 0   TOTAL 4

## 2024-08-09 NOTE — PLAN OF CARE
Goal Outcome Evaluation:         Pt. Visible in the lounge, engaged with peers reporting being excited about leaving, pt reports feeling happy and ready to go.  Endorsing anxiety and depression as okay, medication compliance and contracted for safety.       Discharge orders are received.  Reviewed and discussed discharge instructions, follow up care, future appointments and medication administration, meds sent with patient.  Patient denies thoughts of SI, SIB or hallucinations.  Verbalized understanding of discharge instructions. Received personal belongings.  All forms are signed.  Patient pick - up @0630 by aunt to transport to an IR facility.

## 2024-09-03 ENCOUNTER — OFFICE VISIT (OUTPATIENT)
Dept: FAMILY MEDICINE | Facility: CLINIC | Age: 24
End: 2024-09-03
Payer: COMMERCIAL

## 2024-09-03 VITALS
SYSTOLIC BLOOD PRESSURE: 115 MMHG | OXYGEN SATURATION: 100 % | RESPIRATION RATE: 12 BRPM | BODY MASS INDEX: 24.26 KG/M2 | TEMPERATURE: 98.4 F | HEART RATE: 101 BPM | WEIGHT: 189 LBS | HEIGHT: 74 IN | DIASTOLIC BLOOD PRESSURE: 77 MMHG

## 2024-09-03 DIAGNOSIS — G89.29 CHRONIC LEFT-SIDED LOW BACK PAIN WITH LEFT-SIDED SCIATICA: Primary | ICD-10-CM

## 2024-09-03 DIAGNOSIS — Z13.9 ENCOUNTER FOR SCREENING INVOLVING SOCIAL DETERMINANTS OF HEALTH (SDOH): ICD-10-CM

## 2024-09-03 DIAGNOSIS — M54.42 CHRONIC LEFT-SIDED LOW BACK PAIN WITH LEFT-SIDED SCIATICA: Primary | ICD-10-CM

## 2024-09-03 DIAGNOSIS — F31.12 BIPOLAR AFFECTIVE DISORDER, CURRENTLY MANIC, MODERATE (H): ICD-10-CM

## 2024-09-03 PROBLEM — R45.851 SUICIDE IDEATION: Status: RESOLVED | Noted: 2022-08-10 | Resolved: 2024-09-03

## 2024-09-03 PROBLEM — F12.10 NONDEPENDENT CANNABIS ABUSE: Status: RESOLVED | Noted: 2022-07-20 | Resolved: 2024-09-03

## 2024-09-03 PROBLEM — G21.19 PARKINSONISM DUE TO DRUG (H): Status: RESOLVED | Noted: 2022-08-19 | Resolved: 2024-09-03

## 2024-09-03 PROBLEM — G25.9 EXTRAPYRAMIDAL AND MOVEMENT DISORDER, UNSPECIFIED: Status: RESOLVED | Noted: 2022-08-16 | Resolved: 2024-09-03

## 2024-09-03 PROCEDURE — 99204 OFFICE O/P NEW MOD 45 MIN: CPT | Mod: GC

## 2024-09-03 RX ORDER — IBUPROFEN 400 MG/1
400 TABLET, FILM COATED ORAL EVERY 6 HOURS PRN
Qty: 30 TABLET | Refills: 0 | Status: SHIPPED | OUTPATIENT
Start: 2024-09-03

## 2024-09-03 RX ORDER — IBUPROFEN 400 MG/1
400 TABLET, FILM COATED ORAL EVERY 6 HOURS PRN
Qty: 30 TABLET | Refills: 0 | Status: SHIPPED | OUTPATIENT
Start: 2024-09-03 | End: 2024-09-03

## 2024-09-03 ASSESSMENT — PAIN SCALES - GENERAL: PAINLEVEL: SEVERE PAIN (6)

## 2024-09-03 NOTE — PATIENT INSTRUCTIONS
Patient Education   Here is the plan from today's visit    1. Encounter for screening involving social determinants of health (SDoH)  - Primary Care - Care Coordination Referral; Future    2. Bipolar affective disorder, currently manic, moderate (H)  Continue medications as prescribed, will have behavioral health home help coordinate    3. Chronic left-sided low back pain with left-sided sciatica  Use ibuprofen as needed and use voltaren gel   - diclofenac (VOLTAREN) 1 % topical gel; Apply 2 g topically 4 times daily.  Dispense: 50 g; Refill: 0  - Physical Therapy  Referral; Future      Please call or return to clinic if your symptoms don't go away.    Follow up plan  No follow-ups on file.    Thank you for coming to [facility] today.  Lab Testing:  **If you had lab testing today and your results are reassuring or normal they will be mailed to you or sent through MOVL within 7 days.   **If the lab tests need quick action we will call you with the results.  **If you are having labs done on a different day, please call [phone] to schedule at [facility] or [phone] for other [facility]patient Lab locations. Labs do not offer walk-in appointments.  The phone number we will call with results is # [phone] (home) . If this is not the best number please call our clinic and change the number.  Medication Refills:  If you need any refills please call your pharmacy and they will contact us.   If you need to  your refill at a new pharmacy, please contact the new pharmacy directly. The new pharmacy will help you get your medications transferred faster.   Scheduling:  If you have any concerns about today's visit or wish to schedule another appointment please call our office during normal business hours [phone] (8-5:00 M-F). If you can no longer make a scheduled visit, please cancel via MOVL or call us to cancel.   If a referral was made to an [facility] specialty  provider and you do not get a call from central scheduling, please refer to directions on your visit summary or call our office during normal business hours for assistance.   If a Mammogram was ordered for you at the [facility] Center call [phone] to schedule or change your appointment.  If you had an XRay/CT/Ultrasound/MRI ordered the number is [phone] to schedule or change your radiology appointment.   [facility] has limited ultrasound appointments available on Wednesdays, if you would like your ultrasound at [facility], please call [phone] to schedule.   Medical Concerns:  If you have urgent medical concerns please call [phone] at any time of the day.    Nasrin Raya MD

## 2024-09-03 NOTE — Clinical Note
Dusty French! I just wanted to send you this patient's chart to get them involved in [facility] if possible. He is currently at [facility] and sees a psychiatrist, he was unsure the name right now but will need assistance once he is out of [facility]. Please let me know if there's anything else I need to do. Thanks, Nasrin Raya MD.

## 2024-09-03 NOTE — PROGRESS NOTES
Preceptor Attestation:   Patient seen, evaluated and discussed with the resident. I have verified the content of the note, which accurately reflects my assessment of the patient and the plan of care.   Supervising Physician:  Prisca Seymour MD

## 2024-09-03 NOTE — PROGRESS NOTES
Assessment & Plan   23 year old with PMH significant for bipolar affective disorder versus schizoaffective disorder, anxiety, and substance use presents to establish care.    Chronic left-sided low back pain with left-sided sciatica  Chronic left sided lower back pain without red flag symptoms on physical exam today, negative straight leg and cross straight leg exam. Patient has had years of intermittent back pain with some radiation down to his left leg. Endorses some sexual dysfunction at this time, however complicated by many psychiatric medications. Denies urinary and bowel incontinence. Will trial voltaren gel, PT, and ibuprofen and advised to return if pain worsens. No need for imaging at this time due to chronicity of many years and reassuring physical exam.  - Physical Therapy  Referral; Future  - diclofenac (VOLTAREN) 1 % topical gel; Apply 2 g topically 4 times daily.  - ibuprofen (ADVIL/MOTRIN) 400 MG tablet; Take 1 tablet (400 mg) by mouth every 6 hours as needed for moderate pain.    Encounter for screening involving social determinants of health (SDoH)  Patient endorses difficulty with housing and transportation, will get care coordinator involved to help with transition after [facility].   - Primary Care - Care Coordination Referral; Future    Bipolar affective disorder, currently manic, moderate (H)  Continue medications as prescribed by psychiatry. Patient wanted to increase gabapentin however with increased sedation. For now, will continue as prescribed by psychiatrist at 400mg TID. Will get [facility] involved to aid with patient transition from [facility] and coordinate primary and psychiatry care.    Health maintenance  - HIV and hepatitis C screening tests were discussed, patient not open to discussing testing today.      Nicotine/Tobacco Cessation  He reports that he has been smoking cigarettes. He has never used smokeless tobacco.  Nicotine/Tobacco Cessation Plan  Information offered:  "Patient not interested at this time      Return in about 1 month (around 10/3/2024) for Routine preventive.    Ghazala Raman is a 23 year old, presenting for the following health issues:  Establish Care and Pain (Left leg all the way up to lower back. Discuss stronger gabapentin dose)        9/3/2024     1:53 PM   Additional Questions   Roomed by ambar   Accompanied by self         9/3/2024    Information    services provided? No      HPI   Left leg pain and lower back pain  - started a few years ago, says that it started when he was masturbating  - goes from left back around to left testicle and down the left leg  - describes the pain as achy and pins and needles  - goes away on its own, lasts different amounts of time, happens about 1x per day randomly  - can be up to a 10/10 at its worst, right now 7/10  - denies trauma to the back, urinary incontinence, fecal incontinence  - been causing sexual dysfunction due to the pain  - lying down and elevating leg, Tylenol and ibuprofen, and stretches make the pain better  - gabapentin 400mg TID, wishes to increase    Blood in stools  - goes 1-2x per day   - feels as though he has to push a lot, will have blood on toilet paper  - thinks he has hemorrhoids, not bothersome at this time    Bipolar affective vs schizoaffective disorder  - recent ED visist with mental health hold. Treated with Zyprexa, depakote, and invega.   - Accepted to [facility] will be there for the next 2 months  - medications on discharge: depakote 750mg at night, depakote 500mg in the AM, haldol 5mg BID, olanzapine 10mg BID and PRN, and Invega injections (due 8/15)  - mood with high and lows \"feeling low, feels like just been waken up\"  - suicidal ideation: denies at this time        Review of Systems  Constitutional, HEENT, cardiovascular, pulmonary, gi and gu systems are negative, except as otherwise noted.      Objective    /77 (BP Location: " "Right arm, Patient Position: Sitting, Cuff Size: Adult Regular)   Pulse 101   Temp 98.4  F (36.9  C) (Oral)   Resp 12   Ht 1.88 m (6' 2\")   Wt 85.7 kg (189 lb)   SpO2 100%   BMI 24.27 kg/m    Body mass index is 24.27 kg/m .  Physical Exam  Vitals reviewed.   Constitutional:       Appearance: Normal appearance.   Cardiovascular:      Rate and Rhythm: Normal rate and regular rhythm.      Heart sounds: No murmur heard.  Pulmonary:      Effort: Pulmonary effort is normal.      Breath sounds: Normal breath sounds.   Abdominal:      General: There is no distension.      Palpations: Abdomen is soft.      Tenderness: There is no abdominal tenderness.   Musculoskeletal:      Lumbar back: Tenderness present. No edema, deformity, signs of trauma, spasms or bony tenderness. Negative right straight leg raise test and negative left straight leg raise test.      Left hip: Tenderness present.      Comments: Left sided muscle tenderness along lumbar and sacral paraspinal muscles. Tenderness extends around left side hip.   Skin:     General: Skin is warm and dry.   Neurological:      General: No focal deficit present.      Mental Status: He is alert.            Signed Electronically by: Nasrin Raya MD    "

## 2024-09-27 DIAGNOSIS — Z79.899 HIGH RISK MEDICATION USE: ICD-10-CM

## 2024-09-27 DIAGNOSIS — F31.12 BIPOLAR AFFECTIVE DISORDER, CURRENTLY MANIC, MODERATE (H): Primary | ICD-10-CM

## 2024-10-02 ENCOUNTER — LAB (OUTPATIENT)
Dept: LAB | Facility: CLINIC | Age: 24
End: 2024-10-02
Payer: MEDICAID

## 2024-10-02 DIAGNOSIS — Z79.899 HIGH RISK MEDICATION USE: ICD-10-CM

## 2024-10-02 PROBLEM — F31.9 BIPOLAR DISORDER (H): Status: ACTIVE | Noted: 2022-08-16

## 2024-10-02 PROBLEM — F17.200 NICOTINE DEPENDENCE: Status: ACTIVE | Noted: 2022-07-20

## 2024-10-02 PROBLEM — F12.10 NONDEPENDENT CANNABIS ABUSE: Status: ACTIVE | Noted: 2022-07-20

## 2024-10-02 LAB — VALPROATE SERPL-MCNC: 71.2 UG/ML

## 2024-10-02 PROCEDURE — 80164 ASSAY DIPROPYLACETIC ACD TOT: CPT

## 2024-10-02 PROCEDURE — 36415 COLL VENOUS BLD VENIPUNCTURE: CPT

## 2024-10-09 ENCOUNTER — CARE COORDINATION (OUTPATIENT)
Dept: PSYCHOLOGY | Facility: CLINIC | Age: 24
End: 2024-10-09
Payer: MEDICAID

## 2024-10-09 NOTE — PROGRESS NOTES
ZAHRA CC placed telephone outreach to patient. Patient was referred to [facility] by PCP. Patient currently living at [facility]. Will receive current services there for transition. Patient has been added to [facility] referral list.     ZAHRA reviewed chart, per last visit with PCP she would like him to follow up in 1 month. ZAHRA connected with patient via phone and was able to schedule patient for follow up 10/29.     Will continue to chart review and introduce [facility] when patient is in clinic next.     Mercedez Neal, [facility]  Behavioral [facility] Home- Social Work Care Coordinator

## 2025-01-29 NOTE — PROGRESS NOTES
Pt was sleeping at the beginning of the shift. He appeared asleep in bed most of the shift. He slept for about 7 hours. He is being monitored every 15 minutes for safety. Will continue care plan. No concerns at this time. Set to discharge today.    good balance